# Patient Record
Sex: FEMALE | Race: WHITE | NOT HISPANIC OR LATINO | Employment: OTHER | URBAN - METROPOLITAN AREA
[De-identification: names, ages, dates, MRNs, and addresses within clinical notes are randomized per-mention and may not be internally consistent; named-entity substitution may affect disease eponyms.]

---

## 2017-01-09 RX ORDER — ESCITALOPRAM OXALATE 20 MG/1
TABLET ORAL
Qty: 90 TAB | Refills: 0 | Status: SHIPPED | OUTPATIENT
Start: 2017-01-09 | End: 2017-04-07 | Stop reason: SDUPTHER

## 2017-01-10 ENCOUNTER — OFFICE VISIT (OUTPATIENT)
Dept: MEDICAL GROUP | Facility: MEDICAL CENTER | Age: 72
End: 2017-01-10
Payer: MEDICARE

## 2017-01-10 VITALS
SYSTOLIC BLOOD PRESSURE: 98 MMHG | RESPIRATION RATE: 16 BRPM | DIASTOLIC BLOOD PRESSURE: 58 MMHG | HEART RATE: 74 BPM | WEIGHT: 158 LBS | OXYGEN SATURATION: 92 % | TEMPERATURE: 98.8 F | HEIGHT: 67 IN | BODY MASS INDEX: 24.8 KG/M2

## 2017-01-10 DIAGNOSIS — F51.04 CHRONIC INSOMNIA: ICD-10-CM

## 2017-01-10 DIAGNOSIS — F32.0 MILD SINGLE CURRENT EPISODE OF MAJOR DEPRESSIVE DISORDER (HCC): ICD-10-CM

## 2017-01-10 DIAGNOSIS — Z00.00 HEALTH CARE MAINTENANCE: ICD-10-CM

## 2017-01-10 DIAGNOSIS — Z95.5 HISTORY OF CORONARY ARTERY STENT PLACEMENT: ICD-10-CM

## 2017-01-10 DIAGNOSIS — G45.9 TRANSIENT CEREBRAL ISCHEMIA, UNSPECIFIED TYPE: ICD-10-CM

## 2017-01-10 DIAGNOSIS — E03.9 HYPOTHYROIDISM, UNSPECIFIED TYPE: ICD-10-CM

## 2017-01-10 DIAGNOSIS — Z72.0 TOBACCO ABUSE: ICD-10-CM

## 2017-01-10 DIAGNOSIS — I10 ESSENTIAL HYPERTENSION: ICD-10-CM

## 2017-01-10 DIAGNOSIS — I71.40 ABDOMINAL AORTIC ANEURYSM (AAA) WITHOUT RUPTURE (HCC): ICD-10-CM

## 2017-01-10 DIAGNOSIS — E78.5 HYPERLIPIDEMIA, UNSPECIFIED HYPERLIPIDEMIA TYPE: ICD-10-CM

## 2017-01-10 PROCEDURE — 99204 OFFICE O/P NEW MOD 45 MIN: CPT | Performed by: FAMILY MEDICINE

## 2017-01-10 RX ORDER — CARVEDILOL 6.25 MG/1
6.25 TABLET ORAL 2 TIMES DAILY WITH MEALS
Qty: 180 TAB | Refills: 0 | Status: SHIPPED | OUTPATIENT
Start: 2017-01-10 | End: 2017-04-07 | Stop reason: SDUPTHER

## 2017-01-10 ASSESSMENT — PATIENT HEALTH QUESTIONNAIRE - PHQ9: CLINICAL INTERPRETATION OF PHQ2 SCORE: 0

## 2017-01-10 NOTE — TELEPHONE ENCOUNTER
Was the patient seen in the last year in this department? Yes     Does patient have an active prescription for medications requested? No     Received Request Via: Pharmacy      Pt met protocol?: Yes, LABS 9/16, OV 9/16

## 2017-01-10 NOTE — PROGRESS NOTES
CC: Establish a new PCP.    HPI:  Evie presents today to establish a new primary care relationship.     Lives with her . Active, and independent with all ADLs. Has the following issues:    Essential hypertension, her BP has been slightly low. Patient stated that ometimes she feels lightheaded, denies headache, nausea, vomiting, denies falls.has been on losartan to 100 mg daily., and Amlodipine 5 mg daily.    Hyperlipidemia, she has been tolerating the statin. Denies muscle pain LFTs has been normal, she is on lipitor 80 mg daily.    Abdominal aortic aneurysm, has been stable, and asymptomatic. Last CT showed decreased size of the aneurysm ( was 4.7, last one was 4.4). Will continue watch.    H/O transient cerebral ischemia, twice attacks in 2000, and 2005.Has had no residual.She currently on ASA, and statin.    Mild major depressive disorder, she has been doing fine on Lexapro. No side effects. No suicidal ideation.    History of coronary artery stent placement, has been asymptomatic. No chest pain, and SOB.She is currently on Carvedilol 6.25 mg daily, lipitor 80 mg daily, and aspirin.    Chronic insomnia, she has been doing fine on trazodone 75 mg daily.    Tobacco abuse, smokes 1/4 of a pack daily, was counseled about smoking cessation she does not want to quit.    Hypothyroidism, she has been tolerating Levothyroxine, no palpitation, no cold or heat intolerance, she is on levothyroxine 100 mcg daily.    Pneumonia vaccine, and flu shot are UTD.      Patient Active Problem List    Diagnosis Date Noted   • Chronic insomnia 05/23/2016   • AAA (abdominal aortic aneurysm) ([facility]) 04/20/2016   • Tobacco abuse 04/20/2016   • Pain 02/23/2016   • ADRIANNA on CPAP 02/25/2015   • Postural hypotension 10/07/2014   • Nicotine dependence 10/07/2014   • Hypothyroid 10/17/2013   • Depression 09/26/2013   • CAD (coronary artery disease) 10/04/2012   • Hyperlipidemia 10/04/2012   • Hypertension 10/04/2012   • Aortic aneurysm  ([facility]) 10/04/2012   • TIA (transient ischemic attack) 10/04/2012       Current Outpatient Prescriptions   Medication Sig Dispense Refill   • carvedilol (COREG) 6.25 MG Tab Take 1 Tab by mouth 2 times a day, with meals. 180 Tab 0   • escitalopram (LEXAPRO) 20 MG tablet TAKE 1 TABLET BY MOUTH EVERY DAY 90 Tab 0   • trazodone (DESYREL) 150 MG Tab TAKE 1 TABLET BY MOUTH EVERY NIGHT AT BEDTIME AS NEEDED FOR SLEEP 90 Tab 1   • amlodipine (NORVASC) 5 MG Tab TAKE 1 TABLET BY MOUTH EVERY DAY 90 Tab 1   • spironolactone (ALDACTONE) 50 MG Tab TAKE 1 TABLET BY MOUTH EVERY DAY 90 Tab 1   • atorvastatin (LIPITOR) 80 MG tablet TAKE 1 TABLET BY MOUTH ONCE DAILY 90 Tab 2   • levothyroxine (SYNTHROID) 100 MCG Tab TAKE 1 TABLET BY MOUTH EVERY DAY. NEED LABS DONE ASAP 90 Tab 3   • losartan (COZAAR) 100 MG Tab TAKE 1 TABLET BY MOUTH ONCE DAILY 90 Tab 1   • aspirin (ASA) 325 MG TABS Take 325 mg by mouth every day.       No current facility-administered medications for this visit.         Allergies as of 01/10/2017 - Mic as Reviewed 01/10/2017   Allergen Reaction Noted   • Pcn [penicillins]  06/17/2016        Social History     Social History   • Marital Status:      Spouse Name: N/A   • Number of Children: N/A   • Years of Education: N/A     Occupational History   • Not on file.     Social History Main Topics   • Smoking status: Current Every Day Smoker -- 0.25 packs/day for 40 years     Types: Cigarettes   • Smokeless tobacco: Never Used      Comment: 3-4cigarettes daily, E CIG   • Alcohol Use: No   • Drug Use: No   • Sexual Activity:     Partners: Male     Other Topics Concern   • Not on file     Social History Narrative       Family History   Problem Relation Age of Onset   • Heart Disease Mother    • Heart Attack Mother    • Heart Disease Father    • Heart Attack Father    • Cancer Maternal Grandmother      stomach   • Diabetes Maternal Grandmother    • Cancer Maternal Grandfather      brain   • Stroke Paternal Grandmother   "      History reviewed. No pertinent past surgical history.    ROS:  Denies any Headache, Blurred Vision, Confusion Chest pain,  Shortness of breath,  Abdominal pain, Changes of bowel or bladder, Lower ext edema, Fevers, Nights sweats, Weight Changes, Focal weakness or numbness.  All other systems are negative.    BP 98/58 mmHg  Pulse 74  Temp(Src) 37.1 °C (98.8 °F)  Resp 16  Ht 1.702 m (5' 7.01\")  Wt 71.668 kg (158 lb)  BMI 24.74 kg/m2  SpO2 92%    Physical Exam:  Gen:         Alert and oriented, No apparent distress.  HEENT:   Perrla, TM clear,  Oralpharynx no erythema or exudates.  Neck:       No Jugular venous distension, Lymphadenopathy, Thyromegaly, Bruits.  Lungs:     Clear to auscultation bilaterally  CV:          Regular rate and rhythm. No murmurs, rubs or gallops.  Abd:         Soft non tender, non distended. Normal active bowel sounds. No  hepatosplenomegaly, No pulsatile masses.  Ext:          No clubbing, cyanosis, edema.      Assessment and Plan.   71 y.o. female     1. Essential hypertension  Slightly low. Patient sometimes is lightheaded.  Will decrease losartan to 50 mg daily.  Continue on Amlodipine 5 mg daily.    - CBC WITH DIFFERENTIAL; Future  - COMP METABOLIC PANEL; Future    2. Hyperlipidemia, unspecified hyperlipidemia type  He has been tolerating the statin. Denies muscle pain LFTs has been normal   Continue on lipitor 80 mg daily.    - LIPID PANEL    3. Abdominal aortic aneurysm (AAA) without rupture (HCC)  Stable. Last CT showed decreased size of the aneurysm ( was 4.7, last one was 4.4)  Will continue watch.    4. Transient cerebral ischemia, unspecified type  Twice attacks in 2000, and 2005.  No residual.  Continue ASA, and statin.    5. Mild single current episode of major depressive disorder (HCC)  Has been doing fine on Lexapro. No side effects. No suicidal ideation.    6. History of coronary artery stent placement  Stable. Asymptomatic.  Continue on Carvedilol, statin, and " aspirin.    7. Chronic insomnia  Has been doing fine on trazodone 75 mg daily.    8. Tobacco abuse  Smokes 1/4 of a pack daily, does not want to quit.    9. Health care maintenance  Pneumonia vaccine, and flu shot are UTD>    10. Hypothyroidism, unspecified type  He has been tolerating Levothyroxine, no palpitation, no cold or heat intolerance  Continue on levothyroxine 100 mcg daily.    - TSH+FREE T4

## 2017-01-10 NOTE — MR AVS SNAPSHOT
"        Evie Morillo   1/10/2017 3:00 PM   Office Visit   MRN: [id number]    Department:  [address] [place] [address]   Dept Phone:  [phone]    Description:  Female : 1945   Provider:  Elliott Aguayo M.D.           Reason for Visit     New Patient establish      Allergies as of 1/10/2017     Allergen Noted Reactions    Pcn [Penicillins] 2016         You were diagnosed with     Essential hypertension   [6008162]       Hyperlipidemia, unspecified hyperlipidemia type   [7287911]       Abdominal aortic aneurysm (AAA) without rupture (HCC)   [6442534]       Transient cerebral ischemia, unspecified type   [6685047]       Mild single current episode of major depressive disorder (HCC)   [4677327]       History of coronary artery stent placement   [260389]       Chronic insomnia   [537898]       Tobacco abuse   [944700]       Health care maintenance   [990033]       Hypothyroidism, unspecified type   [1666571]         Vital Signs     Blood Pressure Pulse Temperature Respirations Height Weight    98/58 mmHg 74 37.1 °C (98.8 °F) 16 1.702 m (5' 7.01\") 71.668 kg (158 lb)    Body Mass Index Oxygen Saturation Smoking Status             24.74 kg/m2 92% Current Every Day Smoker         Basic Information     Date Of Birth Sex Race Ethnicity Preferred Language    1945 Female White Non- English      Your appointments     2017 10:20 AM   Established Patient with Elliott Aguayo M.D.   [facility] [address]    [address]  [address]  [place] [address] [phone]           You will be receiving a confirmation call a few days before your appointment from our automated call confirmation system.            2017  3:15 PM   FOLLOW UP with Andre Pulido M.D.   [facility] (--)    [address], [address]  [place] [address] [phone]              Problem List              ICD-10-CM Priority Class Noted - " Resolved    CAD (coronary artery disease) I25.10   10/4/2012 - Present    Hyperlipidemia E78.5   10/4/2012 - Present    Hypertension I10   10/4/2012 - Present    Aortic aneurysm (HCC) I71.9   10/4/2012 - Present    TIA (transient ischemic attack) G45.9   10/4/2012 - Present    Depression F32.9   9/26/2013 - Present    Hypothyroid E03.9   10/17/2013 - Present    Postural hypotension I95.1   10/7/2014 - Present    Nicotine dependence F17.200   10/7/2014 - Present    ADRIANNA on CPAP G47.33   2/25/2015 - Present    Pain R52   2/23/2016 - Present    AAA (abdominal aortic aneurysm) (HCC) I71.4   4/20/2016 - Present    Tobacco abuse Z72.0   4/20/2016 - Present    Chronic insomnia F51.04   5/23/2016 - Present      Health Maintenance        Date Due Completion Dates    IMM DTaP/Tdap/Td Vaccine (1 - Tdap) 6/24/1962 ---    IMM ZOSTER VACCINE 6/24/2003 ---    IMM INFLUENZA (1) 9/1/2016 12/8/2015    IMM PNEUMOCOCCAL 65+ (ADULT) LOW/MEDIUM RISK SERIES (2 of 2 - PPSV23) 12/8/2016 12/8/2015    BONE DENSITY 4/29/2017 4/29/2012 (Done)    Override on 4/29/2012: Done    COLON CANCER SCREENING ANNUAL FIT 9/6/2017 9/6/2016, 9/23/2014    MAMMOGRAM 9/6/2017 9/6/2016 (Declined), 2/25/2015 (Declined), 10/29/2013 (Declined), 10/29/2010 (Done)    Override on 9/6/2016: Patient Declined    Override on 2/25/2015: Patient Declined    Override on 10/29/2013: Patient Declined    Override on 10/29/2010: Done    COLONOSCOPY 9/6/2021 9/6/2016 (Declined), 10/29/2010 (Done)    Override on 9/6/2016: Patient Declined    Override on 10/29/2010: Done (polyps)            Current Immunizations     13-VALENT PCV PREVNAR 12/8/2015    Influenza Vaccine Adult HD 12/8/2015      Below and/or attached are the medications your provider expects you to take. Review all of your home medications and newly ordered medications with your provider and/or pharmacist. Follow medication instructions as directed by your provider and/or pharmacist. Please keep your medication list  with you and share with your provider. Update the information when medications are discontinued, doses are changed, or new medications (including over-the-counter products) are added; and carry medication information at all times in the event of emergency situations     Allergies:  PCN - (reactions not documented)               Medications  Valid as of: January 10, 2017 -  3:37 PM    Generic Name Brand Name Tablet Size Instructions for use    AmLODIPine Besylate (Tab) NORVASC 5 MG TAKE 1 TABLET BY MOUTH EVERY DAY        Aspirin (Tab)  MG Take 325 mg by mouth every day.        Atorvastatin Calcium (Tab) LIPITOR 80 MG TAKE 1 TABLET BY MOUTH ONCE DAILY        Carvedilol (Tab) COREG 6.25 MG Take 1 Tab by mouth 2 times a day, with meals.        Escitalopram Oxalate (Tab) LEXAPRO 20 MG TAKE 1 TABLET BY MOUTH EVERY DAY        Levothyroxine Sodium (Tab) SYNTHROID 100 MCG TAKE 1 TABLET BY MOUTH EVERY DAY. NEED LABS DONE ASAP        Losartan Potassium (Tab) COZAAR 100 MG TAKE 1 TABLET BY MOUTH ONCE DAILY        Spironolactone (Tab) ALDACTONE 50 MG TAKE 1 TABLET BY MOUTH EVERY DAY        TraZODone HCl (Tab) DESYREL 150 MG TAKE 1 TABLET BY MOUTH EVERY NIGHT AT BEDTIME AS NEEDED FOR SLEEP        .                 Medicines prescribed today were sent to:     [id number]  [place], [place] - [address] [facility] [address]    [address] [facility] [address]    Phone: [phone] Fax: [phone]    Open 24 Hours?: No      Medication refill instructions:       If your prescription bottle indicates you have medication refills left, it is not necessary to call your provider’s office. Please contact your pharmacy and they will refill your medication.    If your prescription bottle indicates you do not have any refills left, you may request refills at any time through one of the following ways: The online [employer] system (except Urgent Care), by calling your provider’s office, or by asking your  pharmacy to contact your provider’s office with a refill request. Medication refills are processed only during regular business hours and may not be available until the next business day. Your provider may request additional information or to have a follow-up visit with you prior to refilling your medication.   *Please Note: Medication refills are assigned a new Rx number when refilled electronically. Your pharmacy may indicate that no refills were authorized even though a new prescription for the same medication is available at the pharmacy. Please request the medicine by name with the pharmacy before contacting your provider for a refill.        Your To Do List     Future Labs/Procedures Complete By Expires    CBC WITH DIFFERENTIAL  As directed 1/10/2018    COMP METABOLIC PANEL  As directed 1/10/2018         [employer] Access Code: [email or web address]  Expires: 1/28/2017  2:03 PM    Searcheeze  A secure, online tool to manage your health information     [employer]’s Searcheeze® is a secure, online tool that connects you to your personalized health information from the privacy of your home -- day or night - making it very easy for you to manage your healthcare. Once the activation process is completed, you can even access your medical information using the Searcheeze ame, which is available for free in the Apple Ame store or Google Play store.     Sear[employer]ze provides the following levels of access (as shown below):   My Chart Features   [facility] Primary [facility] Doctor [facility]  Specialists [facility]  Urgent  Care Non-[facility]  Primary Care  Doctor   Email your healthcare team securely and privately 24/7 X X X    Manage appointments: schedule your next appointment; view details of past/upcoming appointments X      Request prescription refills. X      View recent personal medical records, including lab and immunizations X X X X   View health record, including health history, allergies, medications X X X X   Read reports about your outpatient  visits, procedures, consult and ER notes X X X X   See your discharge summary, which is a recap of your hospital and/or ER visit that includes your diagnosis, lab results, and care plan. X X       How to register for [employer]:  1. Go to  [email or web address].  2. Click on the Sign Up Now box, which takes you to the New Member Sign Up page. You will need to provide the following information:  a. Enter your [employer] Access Code exactly as it appears at the top of this page. (You will not need to use this code after you’ve completed the sign-up process. If you do not sign up before the expiration date, you must request a new code.)   b. Enter your date of birth.   c. Enter your home email address.   d. Click Submit, and follow the next screen’s instructions.  3. Create a [employer] ID. This will be your [employer] login ID and cannot be changed, so think of one that is secure and easy to remember.  4. Create a [employer] password. You can change your password at any time.  5. Enter your Password Reset Question and Answer. This can be used at a later time if you forget your password.   6. Enter your e-mail address. This allows you to receive e-mail notifications when new information is available in [employer].  7. Click Sign Up. You can now view your health information.    For assistance activating your [employer] account, call [phone]

## 2017-01-23 DIAGNOSIS — E78.5 HYPERLIPIDEMIA, UNSPECIFIED HYPERLIPIDEMIA TYPE: ICD-10-CM

## 2017-01-23 RX ORDER — LOSARTAN POTASSIUM 100 MG/1
TABLET ORAL
Qty: 90 TAB | Refills: 1 | Status: SHIPPED | OUTPATIENT
Start: 2017-01-23 | End: 2017-07-30 | Stop reason: SDUPTHER

## 2017-01-25 RX ORDER — LOSARTAN POTASSIUM 100 MG/1
TABLET ORAL
Refills: 0 | OUTPATIENT
Start: 2017-01-25

## 2017-01-25 NOTE — TELEPHONE ENCOUNTER
Was the patient seen in the last year in this department? Yes     Does patient have an active prescription for medications requested? Yes     Received Request Via: Pharmacy      Pt met protocol?: No

## 2017-04-07 ENCOUNTER — HOSPITAL ENCOUNTER (OUTPATIENT)
Dept: LAB | Facility: MEDICAL CENTER | Age: 72
End: 2017-04-07
Attending: FAMILY MEDICINE
Payer: MEDICARE

## 2017-04-07 DIAGNOSIS — I10 ESSENTIAL HYPERTENSION: ICD-10-CM

## 2017-04-07 LAB
ALBUMIN SERPL BCP-MCNC: 3.6 G/DL (ref 3.2–4.9)
ALBUMIN/GLOB SERPL: 1.2 G/DL
ALP SERPL-CCNC: 108 U/L (ref 30–99)
ALT SERPL-CCNC: 49 U/L (ref 2–50)
ANION GAP SERPL CALC-SCNC: 4 MMOL/L (ref 0–11.9)
AST SERPL-CCNC: 75 U/L (ref 12–45)
BASOPHILS # BLD AUTO: 0.5 % (ref 0–1.8)
BASOPHILS # BLD: 0.02 K/UL (ref 0–0.12)
BILIRUB SERPL-MCNC: 0.5 MG/DL (ref 0.1–1.5)
BUN SERPL-MCNC: 15 MG/DL (ref 8–22)
CALCIUM SERPL-MCNC: 9.3 MG/DL (ref 8.5–10.5)
CHLORIDE SERPL-SCNC: 108 MMOL/L (ref 96–112)
CHOLEST SERPL-MCNC: 108 MG/DL (ref 100–199)
CO2 SERPL-SCNC: 26 MMOL/L (ref 20–33)
CREAT SERPL-MCNC: 1.06 MG/DL (ref 0.5–1.4)
EOSINOPHIL # BLD AUTO: 0.19 K/UL (ref 0–0.51)
EOSINOPHIL NFR BLD: 4.9 % (ref 0–6.9)
ERYTHROCYTE [DISTWIDTH] IN BLOOD BY AUTOMATED COUNT: 48 FL (ref 35.9–50)
GFR SERPL CREATININE-BSD FRML MDRD: 51 ML/MIN/1.73 M 2
GLOBULIN SER CALC-MCNC: 3.1 G/DL (ref 1.9–3.5)
GLUCOSE SERPL-MCNC: 96 MG/DL (ref 65–99)
HCT VFR BLD AUTO: 39.1 % (ref 37–47)
HDLC SERPL-MCNC: 31 MG/DL
HGB BLD-MCNC: 12.9 G/DL (ref 12–16)
IMM GRANULOCYTES # BLD AUTO: 0.01 K/UL (ref 0–0.11)
IMM GRANULOCYTES NFR BLD AUTO: 0.3 % (ref 0–0.9)
LDLC SERPL CALC-MCNC: 63 MG/DL
LYMPHOCYTES # BLD AUTO: 0.97 K/UL (ref 1–4.8)
LYMPHOCYTES NFR BLD: 25.1 % (ref 22–41)
MCH RBC QN AUTO: 32.1 PG (ref 27–33)
MCHC RBC AUTO-ENTMCNC: 33 G/DL (ref 33.6–35)
MCV RBC AUTO: 97.3 FL (ref 81.4–97.8)
MONOCYTES # BLD AUTO: 0.38 K/UL (ref 0–0.85)
MONOCYTES NFR BLD AUTO: 9.8 % (ref 0–13.4)
NEUTROPHILS # BLD AUTO: 2.29 K/UL (ref 2–7.15)
NEUTROPHILS NFR BLD: 59.4 % (ref 44–72)
NRBC # BLD AUTO: 0 K/UL
NRBC BLD AUTO-RTO: 0 /100 WBC
PLATELET # BLD AUTO: 159 K/UL (ref 164–446)
PMV BLD AUTO: 11.4 FL (ref 9–12.9)
POTASSIUM SERPL-SCNC: 4 MMOL/L (ref 3.6–5.5)
PROT SERPL-MCNC: 6.7 G/DL (ref 6–8.2)
RBC # BLD AUTO: 4.02 M/UL (ref 4.2–5.4)
SODIUM SERPL-SCNC: 138 MMOL/L (ref 135–145)
T4 FREE SERPL-MCNC: 1.62 NG/DL (ref 0.53–1.43)
TRIGL SERPL-MCNC: 71 MG/DL (ref 0–149)
TSH SERPL DL<=0.005 MIU/L-ACNC: 0.41 UIU/ML (ref 0.3–3.7)
WBC # BLD AUTO: 3.9 K/UL (ref 4.8–10.8)

## 2017-04-07 PROCEDURE — 80053 COMPREHEN METABOLIC PANEL: CPT

## 2017-04-07 PROCEDURE — 84443 ASSAY THYROID STIM HORMONE: CPT

## 2017-04-07 PROCEDURE — 85025 COMPLETE CBC W/AUTO DIFF WBC: CPT

## 2017-04-07 PROCEDURE — 80061 LIPID PANEL: CPT

## 2017-04-07 PROCEDURE — 84439 ASSAY OF FREE THYROXINE: CPT

## 2017-04-07 PROCEDURE — 36415 COLL VENOUS BLD VENIPUNCTURE: CPT

## 2017-04-07 RX ORDER — ESCITALOPRAM OXALATE 20 MG/1
TABLET ORAL
Refills: 0 | OUTPATIENT
Start: 2017-04-07

## 2017-04-07 RX ORDER — CARVEDILOL 6.25 MG/1
TABLET ORAL
Refills: 0 | OUTPATIENT
Start: 2017-04-07

## 2017-04-07 RX ORDER — CARVEDILOL 6.25 MG/1
6.25 TABLET ORAL 2 TIMES DAILY WITH MEALS
Qty: 180 TAB | Refills: 0 | Status: SHIPPED | OUTPATIENT
Start: 2017-04-07 | End: 2017-07-25 | Stop reason: SDUPTHER

## 2017-04-07 RX ORDER — ESCITALOPRAM OXALATE 20 MG/1
20 TABLET ORAL
Qty: 90 TAB | Refills: 0 | Status: SHIPPED | OUTPATIENT
Start: 2017-04-07 | End: 2017-07-07 | Stop reason: SDUPTHER

## 2017-04-07 NOTE — TELEPHONE ENCOUNTER
MA: PCP listed as Miah Aguayo, but Carol has been seeing pt regularly for the past few years. Can you f/u w/pt and change PCP is appropriate? Thanks.    Was the patient seen in the last year in this department? Yes     Does patient have an active prescription for medications requested? No     Received Request Via: Pharmacy      Pt met protocol?: No, OV 9/16.

## 2017-04-11 ENCOUNTER — OFFICE VISIT (OUTPATIENT)
Dept: MEDICAL GROUP | Facility: MEDICAL CENTER | Age: 72
End: 2017-04-11
Payer: MEDICARE

## 2017-04-11 VITALS
BODY MASS INDEX: 25.16 KG/M2 | HEART RATE: 56 BPM | DIASTOLIC BLOOD PRESSURE: 52 MMHG | HEIGHT: 66 IN | WEIGHT: 156.53 LBS | SYSTOLIC BLOOD PRESSURE: 90 MMHG | OXYGEN SATURATION: 95 % | TEMPERATURE: 98.1 F | RESPIRATION RATE: 14 BRPM

## 2017-04-11 DIAGNOSIS — E03.9 HYPOTHYROIDISM, UNSPECIFIED TYPE: ICD-10-CM

## 2017-04-11 DIAGNOSIS — R74.8 ABNORMAL LIVER ENZYMES: ICD-10-CM

## 2017-04-11 DIAGNOSIS — E78.5 HYPERLIPIDEMIA, UNSPECIFIED HYPERLIPIDEMIA TYPE: ICD-10-CM

## 2017-04-11 DIAGNOSIS — I10 ESSENTIAL HYPERTENSION: ICD-10-CM

## 2017-04-11 PROCEDURE — 3014F SCREEN MAMMO DOC REV: CPT | Mod: 8P | Performed by: FAMILY MEDICINE

## 2017-04-11 PROCEDURE — 1101F PT FALLS ASSESS-DOCD LE1/YR: CPT | Performed by: FAMILY MEDICINE

## 2017-04-11 PROCEDURE — G8432 DEP SCR NOT DOC, RNG: HCPCS | Performed by: FAMILY MEDICINE

## 2017-04-11 PROCEDURE — 4004F PT TOBACCO SCREEN RCVD TLK: CPT | Performed by: FAMILY MEDICINE

## 2017-04-11 PROCEDURE — G8598 ASA/ANTIPLAT THER USED: HCPCS | Performed by: FAMILY MEDICINE

## 2017-04-11 PROCEDURE — G8419 CALC BMI OUT NRM PARAM NOF/U: HCPCS | Performed by: FAMILY MEDICINE

## 2017-04-11 PROCEDURE — 99214 OFFICE O/P EST MOD 30 MIN: CPT | Performed by: FAMILY MEDICINE

## 2017-04-11 PROCEDURE — 4040F PNEUMOC VAC/ADMIN/RCVD: CPT | Performed by: FAMILY MEDICINE

## 2017-04-11 RX ORDER — LEVOTHYROXINE SODIUM 88 UG/1
88 TABLET ORAL
Qty: 90 TAB | Refills: 1 | Status: SHIPPED | OUTPATIENT
Start: 2017-04-11 | End: 2017-12-18 | Stop reason: SDUPTHER

## 2017-04-11 NOTE — MR AVS SNAPSHOT
"        Evie Edwardsden   2017 10:20 AM   Office Visit   MRN: [id number]    Department:  [address] [place] [address]   Dept Phone:  [phone]    Description:  Female : 1945   Provider:  Elliott Aguayo M.D.           Reason for Visit     Follow-Up 3 month check up, lab results. Low BP      Allergies as of 2017     Allergen Noted Reactions    Pcn [Penicillins] 2016         You were diagnosed with     Essential hypertension   [9913178]       Hyperlipidemia, unspecified hyperlipidemia type   [0602687]       Hypothyroidism, unspecified type   [0900887]       Abnormal liver enzymes   [528978]         Vital Signs     Blood Pressure Pulse Temperature Respirations Height Weight    90/52 mmHg 56 36.7 °C (98.1 °F) 14 1.683 m (5' 6.26\") 71 kg (156 lb 8.4 oz)    Body Mass Index Oxygen Saturation Smoking Status             25.07 kg/m2 95% Current Every Day Smoker         Basic Information     Date Of Birth Sex Race Ethnicity Preferred Language    1945 Female White Non- English      Your appointments     2017  3:15 PM   FOLLOW UP with Andre Pulido M.D.   [facility] (--)    [address]  [facility] [id number] [phone]            May 11, 2017  9:00 AM   Established Patient with Elliott Aguayo M.D.   [facility] [address]    [address]  [facility] [id number] [phone]           You will be receiving a confirmation call a few days before your appointment from our automated call confirmation system.              Problem List              ICD-10-CM Priority Class Noted - Resolved    CAD (coronary artery disease) I25.10   10/4/2012 - Present    Hyperlipidemia E78.5   10/4/2012 - Present    Hypertension I10   10/4/2012 - Present    Aortic aneurysm (CMS-HCC) I71.9   10/4/2012 - Present    TIA (transient ischemic attack) G45.9   10/4/2012 - Present    Depression F32.9   2013 - " Present    Hypothyroid E03.9   10/17/2013 - Present    Postural hypotension I95.1   10/7/2014 - Present    Nicotine dependence F17.200   10/7/2014 - Present    ADRIANNA on CPAP G47.33   2/25/2015 - Present    Pain R52   2/23/2016 - Present    AAA (abdominal aortic aneurysm) (CMS-HCC) I71.4   4/20/2016 - Present    Tobacco abuse Z72.0   4/20/2016 - Present    Chronic insomnia F51.04   5/23/2016 - Present      Health Maintenance        Date Due Completion Dates    IMM DTaP/Tdap/Td Vaccine (1 - Tdap) 6/24/1962 ---    IMM ZOSTER VACCINE 6/24/2003 ---    IMM PNEUMOCOCCAL 65+ (ADULT) LOW/MEDIUM RISK SERIES (2 of 2 - PPSV23) 12/8/2016 12/8/2015    BONE DENSITY 4/29/2017 4/29/2012 (Done)    Override on 4/29/2012: Done    COLON CANCER SCREENING ANNUAL FIT 9/6/2017 9/6/2016, 9/23/2014    MAMMOGRAM 9/6/2017 9/6/2016 (Declined), 2/25/2015 (Declined), 10/29/2013 (Declined), 10/29/2010 (Done)    Override on 9/6/2016: Patient Declined    Override on 2/25/2015: Patient Declined    Override on 10/29/2013: Patient Declined    Override on 10/29/2010: Done    COLONOSCOPY 9/6/2021 9/6/2016 (Declined), 10/29/2010 (Done)    Override on 9/6/2016: Patient Declined    Override on 10/29/2010: Done (polyps)            Current Immunizations     13-VALENT PCV PREVNAR 12/8/2015    Influenza Vaccine Adult HD 12/8/2015      Below and/or attached are the medications your provider expects you to take. Review all of your home medications and newly ordered medications with your provider and/or pharmacist. Follow medication instructions as directed by your provider and/or pharmacist. Please keep your medication list with you and share with your provider. Update the information when medications are discontinued, doses are changed, or new medications (including over-the-counter products) are added; and carry medication information at all times in the event of emergency situations     Allergies:  PCN - (reactions not documented)               Medications  Valid  as of: April 11, 2017 - 11:05 AM    Generic Name Brand Name Tablet Size Instructions for use    Aspirin (Tab)  MG Take 325 mg by mouth every day.        Atorvastatin Calcium (Tab) LIPITOR 80 MG TAKE 1 TABLET BY MOUTH ONCE DAILY        Carvedilol (Tab) COREG 6.25 MG Take 1 Tab by mouth 2 times a day, with meals.        Escitalopram Oxalate (Tab) LEXAPRO 20 MG Take 1 Tab by mouth every day.        Levothyroxine Sodium (Tab) SYNTHROID 88 MCG Take 1 Tab by mouth Every morning on an empty stomach.        Losartan Potassium (Tab) COZAAR 100 MG TAKE 1 TABLET BY MOUTH ONCE DAILY        Spironolactone (Tab) ALDACTONE 50 MG TAKE 1 TABLET BY MOUTH EVERY DAY        TraZODone HCl (Tab) DESYREL 150 MG TAKE 1 TABLET BY MOUTH EVERY NIGHT AT BEDTIME AS NEEDED FOR SLEEP        .                 Medicines prescribed today were sent to:     [id number]  [place], [place] - [address] [facility] [address]    [address] [place] [address]    Phone: [phone] Fax: [phone]    Open 24 Hours?: No      Medication refill instructions:       If your prescription bottle indicates you have medication refills left, it is not necessary to call your provider’s office. Please contact your pharmacy and they will refill your medication.    If your prescription bottle indicates you do not have any refills left, you may request refills at any time through one of the following ways: The online [employer] system (except Urgent Care), by calling your provider’s office, or by asking your pharmacy to contact your provider’s office with a refill request. Medication refills are processed only during regular business hours and may not be available until the next business day. Your provider may request additional information or to have a follow-up visit with you prior to refilling your medication.   *Please Note: Medication refills are assigned a new Rx number when refilled electronically. Your pharmacy may indicate that no  refills were authorized even though a new prescription for the same medication is available at the pharmacy. Please request the medicine by name with the pharmacy before contacting your provider for a refill.        Your To Do List     Future Labs/Procedures Complete By Expires    HEPATIC FUNCTION PANEL  As directed 4/11/2018         Cellabus Access Code: [email or web address]  Expires: 5/11/2017 11:05 AM    Cellabus  A secure, online tool to manage your health information     [employer]ap[employer]’s Cellabus® is a secure, online tool that connects you to your personalized health information from the privacy of your home -- day or night - making it very easy for you to manage your healthcare. Once the activation process is completed, you can even access your medical information using the Cellabus ame, which is available for free in the Apple Ame store or Google Play store.     Cellabus provides the following levels of access (as shown below):   My Chart Features   [facility] Primary Care Doctor [facility]  Specialists [facility]  Urgent  Care [facility]  Primary Care  Doctor   Email your healthcare team securely and privately 24/7 X X X    Manage appointments: schedule your next appointment; view details of past/upcoming appointments X      Request prescription refills. X      View recent personal medical records, including lab and immunizations X X X X   View health record, including health history, allergies, medications X X X X   Read reports about your outpatient visits, procedures, consult and ER notes X X X X   See your discharge summary, which is a recap of your hospital and/or ER visit that includes your diagnosis, lab results, and care plan. X X       How to register for Cellabus:  1. Go to  [email or web address].  2. Click on the Sign Up Now box, which takes you to the New Member Sign Up page. You will need to provide the following information:  a. Enter your Cellabus Access Code exactly as it appears at the top of this page. (You  will not need to use this code after you’ve completed the sign-up process. If you do not sign up before the expiration date, you must request a new code.)   b. Enter your date of birth.   c. Enter your home email address.   d. Click Submit, and follow the next screen’s instructions.  3. Create a Tweekaboo ID. This will be your Tweekaboo login ID and cannot be changed, so think of one that is secure and easy to remember.  4. Create a inSparqt password. You can change your password at any time.  5. Enter your Password Reset Question and Answer. This can be used at a later time if you forget your password.   6. Enter your e-mail address. This allows you to receive e-mail notifications when new information is available in Tweekaboo.  7. Click Sign Up. You can now view your health information.    For assistance activating your Tweekaboo account, call [phone]        Quit Tobacco Information     Do you want to quit using tobacco?    Quitting tobacco decreases risks of cancer, heart and lung disease, increases life expectancy, improves sense of taste and smell, and increases spending money, among other benefits.    If you are thinking about quitting, we can help.  • [facility] Quit Tobacco Program: 686.524.6347  o Program occurs weekly for four weeks and includes pharmacist consultation on products to support quitting smoking or chewing tobacco. A provider referral is needed for pharmacist consultation.  • Tobacco Users Help Hotline: 8-628-QUIT-NOW (058-0912) or https://[place].quitlogix.org/  o Free, confidential telephone and online coaching for [place] residents. Sessions are designed on a schedule that is convenient for you. Eligible clients receive free nicotine replacement therapy.  • Nationally: www.smokefree.gov  o Information and professional assistance to support both immediate and long-term needs as you become, and remain, a non-smoker. Smokefree.gov allows you to choose the help that best fits your needs.

## 2017-04-11 NOTE — PROGRESS NOTES
CC: Multiple medical issues/ review lab result.    HPI:   Evie presents today for follow up of her chronic medical issues:    Essential hypertension, patient has been having low BP, and she has been complaining of lightheadedness intermittently, special when she wakes up in AM. Denies headache, blurry vision, and falls. Has been on Amlodipine 5 mg, losartan 100 mg    Hyperlipidemia, she has been on lipitor 80 mg for h/o TIAs X 2( in 2001, and 2003, non after that) . Last lipid panel showed normal T chol, and LDL.has been having slightly high AST ( 75),but a normal ALT. denies abdominal pain, change in skin, urine, and stool color.    Hypothyroidism, her last lipid panel showed high free T4, and low normal TSH.She has been on levothyroxine from 100 mcg, has been asymptomatic.      Patient Active Problem List    Diagnosis Date Noted   • Chronic insomnia 05/23/2016   • AAA (abdominal aortic aneurysm) (CMS-HCC) 04/20/2016   • Tobacco abuse 04/20/2016   • Pain 02/23/2016   • ADRIANNA on CPAP 02/25/2015   • Postural hypotension 10/07/2014   • Nicotine dependence 10/07/2014   • Hypothyroid 10/17/2013   • Depression 09/26/2013   • CAD (coronary artery disease) 10/04/2012   • Hyperlipidemia 10/04/2012   • Hypertension 10/04/2012   • Aortic aneurysm (CMS-HCC) 10/04/2012   • TIA (transient ischemic attack) 10/04/2012       Current Outpatient Prescriptions   Medication Sig Dispense Refill   • levothyroxine (SYNTHROID) 88 MCG Tab Take 1 Tab by mouth Every morning on an empty stomach. 90 Tab 1   • escitalopram (LEXAPRO) 20 MG tablet Take 1 Tab by mouth every day. 90 Tab 0   • carvedilol (COREG) 6.25 MG Tab Take 1 Tab by mouth 2 times a day, with meals. 180 Tab 0   • losartan (COZAAR) 100 MG Tab TAKE 1 TABLET BY MOUTH ONCE DAILY 90 Tab 1   • trazodone (DESYREL) 150 MG Tab TAKE 1 TABLET BY MOUTH EVERY NIGHT AT BEDTIME AS NEEDED FOR SLEEP 90 Tab 1   • spironolactone (ALDACTONE) 50 MG Tab TAKE 1 TABLET BY MOUTH EVERY DAY 90 Tab 1   •  "atorvastatin (LIPITOR) 80 MG tablet TAKE 1 TABLET BY MOUTH ONCE DAILY 90 Tab 2   • aspirin (ASA) 325 MG TABS Take 325 mg by mouth every day.       No current facility-administered medications for this visit.         Allergies as of 04/11/2017 - Mic as Reviewed 04/11/2017   Allergen Reaction Noted   • Pcn [penicillins]  06/17/2016        ROS: Denies any chest pain, Shortness of breath, Changes bowel or bladder, Lower extremity edema.    Physical Exam:  BP 90/52 mmHg  Pulse 56  Temp(Src) 36.7 °C (98.1 °F)  Resp 14  Ht 1.683 m (5' 6.26\")  Wt 71 kg (156 lb 8.4 oz)  BMI 25.07 kg/m2  SpO2 95%  Gen.: Well-developed, well-nourished, no apparent distress,pleasant and cooperative with the examination  Skin:  Warm and dry with good turgor. No rashes or suspicious lesions in visible areas  HEENT:Sinuses nontender with palpation, TMs clear, nares patent with pink mucosa and clear rhinorrhea,no septal deviation ,polyps or lesions. lips without lesions, oropharynx clear.  Neck: Trachea midline,no masses or adenopathy. No JVD.  Cor: Regular rate and rhythm without murmur, gallop or rub.  Lungs: Respirations unlabored.Clear to auscultation with equal breath sounds bilaterally. No wheezes, rhonchi.  Extremities: No cyanosis, clubbing or edema.  Abd: Soft, NT, ND, BS+.        Assessment and Plan.   71 y.o. female     1. Essential hypertension  Low BP. Symptomatic ( intermittent dizziness)  Will stop Amlodipine,will decrease Losartan to 1/2 a tablet ( 50 mg daily).  Patient advised to check BP 2 times daily, and send me the BP log for reevaluation.  RTC in a month.    2. Hyperlipidemia, unspecified hyperlipidemia type  Has been on lipitor 80 mg . Has normal T chol, and LDL.  Has slightly high AST ( 75),.  Will decrease Lipitor to 1/2 a tablet.  Will repeat LFTs in 3 weeks.  RTC in a month.    3. Hypothyroidism, unspecified type  Last lipid panel showed high free T4, and low normal TSH.  Will decrease levothyroxine from 100 mcg " to 88 mcg.    - levothyroxine (SYNTHROID) 88 MCG Tab; Take 1 Tab by mouth Every morning on an empty stomach.  Dispense: 90 Tab; Refill: 1  - TSH+FREE T4    4. Abnormal liver enzymes  Slightly high AST ( 78), normal ALT, asymptomatic.  Will continue the statin but will decrease the dose. Follow up lipid panel.    - HEPATIC FUNCTION PANEL; Future

## 2017-04-27 ENCOUNTER — OFFICE VISIT (OUTPATIENT)
Dept: CARDIOLOGY | Facility: MEDICAL CENTER | Age: 72
End: 2017-04-27
Payer: MEDICARE

## 2017-04-27 VITALS
BODY MASS INDEX: 24.48 KG/M2 | HEIGHT: 67 IN | OXYGEN SATURATION: 94 % | WEIGHT: 156 LBS | HEART RATE: 58 BPM | SYSTOLIC BLOOD PRESSURE: 96 MMHG | DIASTOLIC BLOOD PRESSURE: 50 MMHG

## 2017-04-27 DIAGNOSIS — F17.219 CIGARETTE NICOTINE DEPENDENCE WITH NICOTINE-INDUCED DISORDER: ICD-10-CM

## 2017-04-27 DIAGNOSIS — G47.33 OSA ON CPAP: ICD-10-CM

## 2017-04-27 DIAGNOSIS — I25.10 CORONARY ARTERY DISEASE INVOLVING NATIVE CORONARY ARTERY OF NATIVE HEART WITHOUT ANGINA PECTORIS: ICD-10-CM

## 2017-04-27 DIAGNOSIS — Z72.0 TOBACCO ABUSE: ICD-10-CM

## 2017-04-27 DIAGNOSIS — I71.40 ABDOMINAL AORTIC ANEURYSM (AAA) WITHOUT RUPTURE (HCC): ICD-10-CM

## 2017-04-27 PROCEDURE — G8420 CALC BMI NORM PARAMETERS: HCPCS | Performed by: INTERNAL MEDICINE

## 2017-04-27 PROCEDURE — 4004F PT TOBACCO SCREEN RCVD TLK: CPT | Performed by: INTERNAL MEDICINE

## 2017-04-27 PROCEDURE — G8598 ASA/ANTIPLAT THER USED: HCPCS | Performed by: INTERNAL MEDICINE

## 2017-04-27 PROCEDURE — 3014F SCREEN MAMMO DOC REV: CPT | Mod: 8P | Performed by: INTERNAL MEDICINE

## 2017-04-27 PROCEDURE — 1101F PT FALLS ASSESS-DOCD LE1/YR: CPT | Performed by: INTERNAL MEDICINE

## 2017-04-27 PROCEDURE — 4040F PNEUMOC VAC/ADMIN/RCVD: CPT | Performed by: INTERNAL MEDICINE

## 2017-04-27 PROCEDURE — G8432 DEP SCR NOT DOC, RNG: HCPCS | Performed by: INTERNAL MEDICINE

## 2017-04-27 PROCEDURE — 99213 OFFICE O/P EST LOW 20 MIN: CPT | Performed by: INTERNAL MEDICINE

## 2017-04-27 NOTE — MR AVS SNAPSHOT
"        Evie Morillo   2017 3:15 PM   Office Visit   MRN: [id number]    Department:  [facility]   Dept Phone:  [phone]    Description:  Female : 1945   Provider:  Andre Pulido M.D.           Reason for Visit     Follow-Up           Allergies as of 2017     Allergen Noted Reactions    Pcn [Penicillins] 2016         Vital Signs     Blood Pressure Pulse Height Weight Body Mass Index Oxygen Saturation    96/50 mmHg 58 1.702 m (5' 7.01\") 70.761 kg (156 lb) 24.43 kg/m2 94%    Smoking Status                   Current Every Day Smoker           Basic Information     Date Of Birth Sex Race Ethnicity Preferred Language    1945 Female White Non- English      Your appointments     May 11, 2017  9:00 AM   Established Patient with Elliott Aguayo M.D.   [facility] [address]    [address]  [address]  [facility] [address]   [phone]           You will be receiving a confirmation call a few days before your appointment from our automated call confirmation system.              Problem List              ICD-10-CM Priority Class Noted - Resolved    CAD (coronary artery disease) I25.10   10/4/2012 - Present    Hyperlipidemia E78.5   10/4/2012 - Present    Hypertension I10   10/4/2012 - Present    Aortic aneurysm (CMS-HCC) I71.9   10/4/2012 - Present    TIA (transient ischemic attack) G45.9   10/4/2012 - Present    Depression F32.9   2013 - Present    Hypothyroid E03.9   10/17/2013 - Present    Postural hypotension I95.1   10/7/2014 - Present    Nicotine dependence F17.200   10/7/2014 - Present    ADRIANNA on CPAP G47.33   2015 - Present    Pain R52   2016 - Present    AAA (abdominal aortic aneurysm) (CMS-HCC) I71.4   2016 - Present    Tobacco abuse Z72.0   2016 - Present    Chronic insomnia F51.04   2016 - Present      Health Maintenance        Date Due Completion Dates    IMM DTaP/Tdap/Td Vaccine (1 - Tdap) 1962 " ---    IMM ZOSTER VACCINE 6/24/2003 ---    IMM PNEUMOCOCCAL 65+ (ADULT) LOW/MEDIUM RISK SERIES (2 of 2 - PPSV23) 12/8/2016 12/8/2015    BONE DENSITY 4/29/2017 4/29/2012 (Done)    Override on 4/29/2012: Done    COLON CANCER SCREENING ANNUAL FIT 9/6/2017 9/6/2016, 9/23/2014    MAMMOGRAM 9/6/2017 9/6/2016 (Declined), 2/25/2015 (Declined), 10/29/2013 (Declined), 10/29/2010 (Done)    Override on 9/6/2016: Patient Declined    Override on 2/25/2015: Patient Declined    Override on 10/29/2013: Patient Declined    Override on 10/29/2010: Done    COLONOSCOPY 9/6/2021 9/6/2016 (Declined), 10/29/2010 (Done)    Override on 9/6/2016: Patient Declined    Override on 10/29/2010: Done (polyps)            Current Immunizations     13-VALENT PCV PREVNAR 12/8/2015    Influenza Vaccine Adult HD 12/8/2015      Below and/or attached are the medications your provider expects you to take. Review all of your home medications and newly ordered medications with your provider and/or pharmacist. Follow medication instructions as directed by your provider and/or pharmacist. Please keep your medication list with you and share with your provider. Update the information when medications are discontinued, doses are changed, or new medications (including over-the-counter products) are added; and carry medication information at all times in the event of emergency situations     Allergies:  PCN - (reactions not documented)               Medications  Valid as of: April 27, 2017 -  3:21 PM    Generic Name Brand Name Tablet Size Instructions for use    Aspirin (Tab)  MG Take 325 mg by mouth every day.        Atorvastatin Calcium (Tab) LIPITOR 80 MG TAKE 1 TABLET BY MOUTH ONCE DAILY        Carvedilol (Tab) COREG 6.25 MG Take 1 Tab by mouth 2 times a day, with meals.        Escitalopram Oxalate (Tab) LEXAPRO 20 MG Take 1 Tab by mouth every day.        Levothyroxine Sodium (Tab) SYNTHROID 88 MCG Take 1 Tab by mouth Every morning on an empty stomach.         Losartan Potassium (Tab) COZAAR 100 MG TAKE 1 TABLET BY MOUTH ONCE DAILY        Spironolactone (Tab) ALDACTONE 50 MG TAKE 1 TABLET BY MOUTH EVERY DAY        TraZODone HCl (Tab) DESYREL 150 MG TAKE 1 TABLET BY MOUTH EVERY NIGHT AT BEDTIME AS NEEDED FOR SLEEP        .                 Medicines prescribed today were sent to:     [id number] - [place], [place] - [address] AT [facility] [address]    [address] [place] [address]    Phone: [phone] Fax: [phone]    Open 24 Hours?: No      Medication refill instructions:       If your prescription bottle indicates you have medication refills left, it is not necessary to call your provider’s office. Please contact your pharmacy and they will refill your medication.    If your prescription bottle indicates you do not have any refills left, you may request refills at any time through one of the following ways: The online [employer] system (except Urgent Care), by calling your provider’s office, or by asking your pharmacy to contact your provider’s office with a refill request. Medication refills are processed only during regular business hours and may not be available until the next business day. Your provider may request additional information or to have a follow-up visit with you prior to refilling your medication.   *Please Note: Medication refills are assigned a new Rx number when refilled electronically. Your pharmacy may indicate that no refills were authorized even though a new prescription for the same medication is available at the pharmacy. Please request the medicine by name with the pharmacy before contacting your provider for a refill.           [employer] Access Code: Activation code not generated  Current [employer] Status: Active          Quit Tobacco Information     Do you want to quit using tobacco?    Quitting tobacco decreases risks of cancer, heart and lung disease, increases life expectancy, improves sense of taste and smell,  and increases spending money, among other benefits.    If you are thinking about quitting, we can help.  • [facility] Quit Tobacco Program: 632.383.1427  o Program occurs weekly for four weeks and includes pharmacist consultation on products to support quitting smoking or chewing tobacco. A provider referral is needed for pharmacist consultation.  • Tobacco Users Help Hotline: 6-800-QUIT-NOW (809-6679) or https://[place].quitlogix.org/  o Free, confidential telephone and online coaching for [place] residents. Sessions are designed on a schedule that is convenient for you. Eligible clients receive free nicotine replacement therapy.  • Nationally: www.smokefree.gov  o Information and professional assistance to support both immediate and long-term needs as you become, and remain, a non-smoker. Smokefree.gov allows you to choose the help that best fits your needs.

## 2017-04-27 NOTE — PROGRESS NOTES
Subjective:   Evie Morillo is a 70 y.o. female who presents today for follow-up of CAD and hypertension. She has a history of 2 stents placed in [place] to an unknown artery in 2009, AAA followed by Dr. Beaver, hyperlipidemia on good therapy with excellent numbers and history of a TIA in 2003. She continues to smoke but has cut down significantly and has lost weight. Her blood pressure is good and in fact has been a little low and her PCP has reduced her therapy appropriately.    Denies any other cardiovascular symptoms including chest pain, shortness of breath, dyspnea on exertion, lightheadedness, syncope or presyncope, lower extremity edema, PND, orthopnea or palpitations.      Past Medical History   Diagnosis Date   • Hypertension    • AAA (abdominal aortic aneurysm) (CMS-HCC)    • Diverticula of colon    • CAD (coronary artery disease) 10/4/2012     2 stents [place] 2009    • Hyperlipidemia 10/4/2012   • Aortic aneurysm (CMS-HCC) 10/4/2012   • TIA (transient ischemic attack) 10/4/2012     2003    • Depression 9/26/2013   • Thyroid disease      History reviewed. No pertinent past surgical history.  Family History   Problem Relation Age of Onset   • Heart Disease Mother    • Heart Attack Mother    • Heart Disease Father    • Heart Attack Father    • Cancer Maternal Grandmother      stomach   • Diabetes Maternal Grandmother    • Cancer Maternal Grandfather      brain   • Stroke Paternal Grandmother      History   Smoking status   • Current Every Day Smoker -- 0.25 packs/day for 40 years   • Types: Cigarettes   Smokeless tobacco   • Never Used     Comment: 3-4cigarettes daily, E CIG     Allergies   Allergen Reactions   • Pcn [Penicillins]      Outpatient Encounter Prescriptions as of 4/27/2017   Medication Sig Dispense Refill   • levothyroxine (SYNTHROID) 88 MCG Tab Take 1 Tab by mouth Every morning on an empty stomach. 90 Tab 1   • escitalopram (LEXAPRO) 20 MG tablet Take 1 Tab by mouth every day. 90 Tab 0  "  • carvedilol (COREG) 6.25 MG Tab Take 1 Tab by mouth 2 times a day, with meals. (Patient taking differently: Take 6.25 mg by mouth every day.) 180 Tab 0   • losartan (COZAAR) 100 MG Tab TAKE 1 TABLET BY MOUTH ONCE DAILY 90 Tab 1   • trazodone (DESYREL) 150 MG Tab TAKE 1 TABLET BY MOUTH EVERY NIGHT AT BEDTIME AS NEEDED FOR SLEEP 90 Tab 1   • spironolactone (ALDACTONE) 50 MG Tab TAKE 1 TABLET BY MOUTH EVERY DAY 90 Tab 1   • atorvastatin (LIPITOR) 80 MG tablet TAKE 1 TABLET BY MOUTH ONCE DAILY 90 Tab 2   • aspirin (ASA) 325 MG TABS Take 325 mg by mouth every day.       No facility-administered encounter medications on file as of 4/27/2017.     Review of Systems   All other systems reviewed and are negative.         Objective:   BP 96/50 mmHg  Pulse 58  Ht 1.702 m (5' 7.01\")  Wt 70.761 kg (156 lb)  BMI 24.43 kg/m2  SpO2 94%    Physical Exam   Constitutional: She is oriented to person, place, and time. She appears well-developed and well-nourished. No distress.   HENT:   Head: Normocephalic and atraumatic.   Mouth/Throat: Oropharynx is clear and moist. No oropharyngeal exudate.   Eyes: Conjunctivae are normal. Pupils are equal, round, and reactive to light. No scleral icterus.   Neck: Normal range of motion. Neck supple. No JVD present. No thyromegaly present.   Cardiovascular: Normal rate, regular rhythm, normal heart sounds and intact distal pulses.  Exam reveals no gallop and no friction rub.    No murmur heard.  Pulses:       Carotid pulses are 2+ on the right side, and 2+ on the left side.       Radial pulses are 2+ on the right side, and 2+ on the left side.        Popliteal pulses are 2+ on the right side, and 2+ on the left side.        Dorsalis pedis pulses are 1+ on the right side, and 1+ on the left side.        Posterior tibial pulses are 1+ on the left side.   Pulmonary/Chest: Effort normal and breath sounds normal. She has no wheezes. She has no rales.   Abdominal: Soft. Bowel sounds are normal. She " exhibits no distension. There is no tenderness.   Pulsatile abdominal mass, not severely enlarged by palpation   Musculoskeletal: She exhibits edema (1+ bilateral). She exhibits no tenderness.   Neurological: She is alert and oriented to person, place, and time. No cranial nerve deficit.   Skin: Skin is warm and dry. No rash noted. She is not diaphoretic. No erythema.   Psychiatric: She has a normal mood and affect. Her behavior is normal.   Vitals reviewed.    LABS:  Lab Results   Component Value Date/Time    CHOLESTEROL, 04/07/2017 08:55 AM    LDL 63 04/07/2017 08:55 AM    HDL 31* 04/07/2017 08:55 AM    TRIGLYCERIDES 71 04/07/2017 08:55 AM       Lab Results   Component Value Date/Time    WBC 3.9* 04/07/2017 08:55 AM    RBC 4.02* 04/07/2017 08:55 AM    HEMOGLOBIN 12.9 04/07/2017 08:55 AM    HEMATOCRIT 39.1 04/07/2017 08:55 AM    MCV 97.3 04/07/2017 08:55 AM    NEUTROPHILS-POLYS 59.40 04/07/2017 08:55 AM    LYMPHOCYTES 25.10 04/07/2017 08:55 AM    MONOCYTES 9.80 04/07/2017 08:55 AM    EOSINOPHILS 4.90 04/07/2017 08:55 AM    BASOPHILS 0.50 04/07/2017 08:55 AM     Lab Results   Component Value Date/Time    SODIUM 138 04/07/2017 08:55 AM    POTASSIUM 4.0 04/07/2017 08:55 AM    CHLORIDE 108 04/07/2017 08:55 AM    CO2 26 04/07/2017 08:55 AM    GLUCOSE 96 04/07/2017 08:55 AM    BUN 15 04/07/2017 08:55 AM    CREATININE 1.06 04/07/2017 08:55 AM         Lab Results   Component Value Date/Time    ALKALINE PHOSPHATASE 108* 04/07/2017 08:55 AM    AST(SGOT) 75* 04/07/2017 08:55 AM    ALT(SGPT) 49 04/07/2017 08:55 AM    TOTAL BILIRUBIN 0.5 04/07/2017 08:55 AM      Lab Results   Component Value Date/Time    B NATRIURETIC PEPTIDE 10 06/17/2016 02:26 PM      No results found for: TSH  Lab Results   Component Value Date/Time    PT 12.6 06/17/2016 02:26 PM    INR 0.94 06/17/2016 02:26 PM              Assessment:     1. Coronary artery disease involving native coronary artery of native heart without angina pectoris     2.  Abdominal aortic aneurysm (AAA) without rupture (CMS-HCC)     3. Tobacco abuse     4. ADRIANNA on CPAP     5. Cigarette nicotine dependence with nicotine-induced disorder         Medical Decision Making:  Today's Assessment / Status / Plan:     She is feeling well. She has no complaints today. She has cut down her smoking to one or 2 cigarettes per day. She is trying to quit. Blood pressures are borderline low but relatively asymptomatic and her medications have been adjusted already by her PCP appropriately. Her AST is mildly elevated and likely a sequela of her statin therapy however this is tolerable at this level and requires surveillance only. Goal LDL is less than 70 and she has achieved this.      Recommendations:    1. Goal LDL less than 70, labs pending  2. Blood pressure is excellent  3. Good medical therapy  4. Tobacco cessation discussed at length  5. Continue close follow-up with Dr. Beaver for her AAA    Follow-up one year

## 2017-05-04 ENCOUNTER — HOSPITAL ENCOUNTER (OUTPATIENT)
Dept: LAB | Facility: MEDICAL CENTER | Age: 72
End: 2017-05-04
Attending: FAMILY MEDICINE
Payer: MEDICARE

## 2017-05-04 DIAGNOSIS — R74.8 ABNORMAL LIVER ENZYMES: ICD-10-CM

## 2017-05-04 LAB
ALBUMIN SERPL BCP-MCNC: 3.6 G/DL (ref 3.2–4.9)
ALP SERPL-CCNC: 107 U/L (ref 30–99)
ALT SERPL-CCNC: 43 U/L (ref 2–50)
AST SERPL-CCNC: 62 U/L (ref 12–45)
BILIRUB CONJ SERPL-MCNC: 0.1 MG/DL (ref 0.1–0.5)
BILIRUB INDIRECT SERPL-MCNC: 0.4 MG/DL (ref 0–1)
BILIRUB SERPL-MCNC: 0.5 MG/DL (ref 0.1–1.5)
PROT SERPL-MCNC: 6.9 G/DL (ref 6–8.2)

## 2017-05-04 PROCEDURE — 36415 COLL VENOUS BLD VENIPUNCTURE: CPT

## 2017-05-04 PROCEDURE — 80076 HEPATIC FUNCTION PANEL: CPT

## 2017-05-09 ENCOUNTER — TELEPHONE (OUTPATIENT)
Dept: MEDICAL GROUP | Facility: MEDICAL CENTER | Age: 72
End: 2017-05-09

## 2017-05-09 NOTE — TELEPHONE ENCOUNTER
Future Appointments       Provider Department Center    5/11/2017 9:00 AM Elliott Aguayo M.D. [facility] [address]          ESTABLISHED PATIENT PRE-VISIT PLANNING     Note: Patient will not be contacted if there is no indication to call. PT was not Contacted.    1.    Reviewed note from last office visit with PCP: YES Last office visit: 04/11/17    2.  If any orders were placed at last visit, do we have Results/Consult Notes?        •  Labs - Labs were not ordered at last office visit. 04/07/17       •  Imaging - Imaging was not ordered at last office visit.        •  Referrals - Referral ordered, patient was seen and consult notes are in chart. Care Teams updated  YES.     3.  Immunizations were updated in Epic using WebIZ?: Epic matches WebIZ       •  Web Iz Recommendations: HEPATITIS A  HEPATITIS B VARICELLA (Chicken Pox)  ZOSTAVAX (Shingles)    4.  Patient is due for the following Health Maintenance Topics:   Health Maintenance Due   Topic Date Due   • Annual Wellness Visit  NEEDS TO SCHEDULE   • IMM ZOSTER VACCINE  NEEDS SCRIPT   • BONE DENSITY  NEEDS ORDERS       5.  Patient was not informed to arrive 15 min prior to their scheduled appointment and bring in their medication bottles.

## 2017-05-11 ENCOUNTER — OFFICE VISIT (OUTPATIENT)
Dept: MEDICAL GROUP | Facility: MEDICAL CENTER | Age: 72
End: 2017-05-11
Payer: MEDICARE

## 2017-05-11 VITALS
DIASTOLIC BLOOD PRESSURE: 68 MMHG | SYSTOLIC BLOOD PRESSURE: 106 MMHG | WEIGHT: 156.09 LBS | RESPIRATION RATE: 14 BRPM | TEMPERATURE: 97.8 F | HEIGHT: 67 IN | OXYGEN SATURATION: 95 % | HEART RATE: 57 BPM | BODY MASS INDEX: 24.5 KG/M2

## 2017-05-11 DIAGNOSIS — E78.5 HYPERLIPIDEMIA, UNSPECIFIED HYPERLIPIDEMIA TYPE: ICD-10-CM

## 2017-05-11 DIAGNOSIS — R74.8 ELEVATED LIVER ENZYMES: ICD-10-CM

## 2017-05-11 PROCEDURE — 4004F PT TOBACCO SCREEN RCVD TLK: CPT | Performed by: FAMILY MEDICINE

## 2017-05-11 PROCEDURE — 1101F PT FALLS ASSESS-DOCD LE1/YR: CPT | Performed by: FAMILY MEDICINE

## 2017-05-11 PROCEDURE — G8432 DEP SCR NOT DOC, RNG: HCPCS | Performed by: FAMILY MEDICINE

## 2017-05-11 PROCEDURE — G8598 ASA/ANTIPLAT THER USED: HCPCS | Performed by: FAMILY MEDICINE

## 2017-05-11 PROCEDURE — G8420 CALC BMI NORM PARAMETERS: HCPCS | Performed by: FAMILY MEDICINE

## 2017-05-11 PROCEDURE — 3014F SCREEN MAMMO DOC REV: CPT | Mod: 8P | Performed by: FAMILY MEDICINE

## 2017-05-11 PROCEDURE — 99213 OFFICE O/P EST LOW 20 MIN: CPT | Performed by: FAMILY MEDICINE

## 2017-05-11 PROCEDURE — 4040F PNEUMOC VAC/ADMIN/RCVD: CPT | Performed by: FAMILY MEDICINE

## 2017-05-11 NOTE — MR AVS SNAPSHOT
"        Evie Morillo   2017 9:00 AM   Office Visit   MRN: [id number]    Department:  [address] [place] [address]   Dept Phone:  [phone]    Description:  Female : 1945   Provider:  Elliott Aguayo M.D.           Reason for Visit     Follow-Up 1 month check up.    Otalgia left ear ache. since this morning    Letter for School/Work DMV Physical Eval.      Allergies as of 2017     Allergen Noted Reactions    Pcn [Penicillins] 2016         You were diagnosed with     Hyperlipidemia, unspecified hyperlipidemia type   [4857587]       Elevated liver enzymes   [[id number]]         Vital Signs     Blood Pressure Pulse Temperature Respirations Height Weight    106/68 mmHg 57 36.6 °C (97.8 °F) 14 1.702 m (5' 7\") 70.8 kg (156 lb 1.4 oz)    Body Mass Index Oxygen Saturation Smoking Status             24.44 kg/m2 95% Current Every Day Smoker         Basic Information     Date Of Birth Sex Race Ethnicity Preferred Language    1945 Female White Non- English      Problem List              ICD-10-CM Priority Class Noted - Resolved    CAD (coronary artery disease) I25.10   10/4/2012 - Present    Hyperlipidemia E78.5   10/4/2012 - Present    Hypertension I10   10/4/2012 - Present    Aortic aneurysm (CMS-HCC) I71.9   10/4/2012 - Present    TIA (transient ischemic attack) G45.9   10/4/2012 - Present    Depression F32.9   2013 - Present    Hypothyroid E03.9   10/17/2013 - Present    Postural hypotension I95.1   10/7/2014 - Present    Nicotine dependence F17.200   10/7/2014 - Present    ADRIANNA on CPAP G47.33, Z99.89   2015 - Present    Pain R52   2016 - Present    AAA (abdominal aortic aneurysm) (CMS-HCC) I71.4   2016 - Present    Tobacco abuse Z72.0   2016 - Present    Chronic insomnia F51.04   2016 - Present      Health Maintenance        Date Due Completion Dates    IMM ZOSTER VACCINE 2003 ---    BONE DENSITY 2017 (Done)    Override on 2012: " Done    IMM DTaP/Tdap/Td Vaccine (1 - Tdap) 4/21/2022 (Originally 4/22/2012) 4/21/2012    COLON CANCER SCREENING ANNUAL FIT 9/6/2017 9/6/2016, 9/23/2014    MAMMOGRAM 9/6/2017 9/6/2016 (Declined), 2/25/2015 (Declined), 10/29/2013 (Declined), 10/29/2010 (Done)    Override on 9/6/2016: Patient Declined    Override on 2/25/2015: Patient Declined    Override on 10/29/2013: Patient Declined    Override on 10/29/2010: Done    COLONOSCOPY 9/6/2021 9/6/2016 (Declined), 10/29/2010 (Done)    Override on 9/6/2016: Patient Declined    Override on 10/29/2010: Done (polyps)            Current Immunizations     13-VALENT PCV PREVNAR 12/8/2015    Influenza Vaccine Adult HD 12/8/2015    Pneumococcal polysaccharide vaccine (PPSV-23) 8/29/2013    TD Vaccine 4/21/2012      Below and/or attached are the medications your provider expects you to take. Review all of your home medications and newly ordered medications with your provider and/or pharmacist. Follow medication instructions as directed by your provider and/or pharmacist. Please keep your medication list with you and share with your provider. Update the information when medications are discontinued, doses are changed, or new medications (including over-the-counter products) are added; and carry medication information at all times in the event of emergency situations     Allergies:  PCN - (reactions not documented)               Medications  Valid as of: May 11, 2017 - 10:21 AM    Generic Name Brand Name Tablet Size Instructions for use    Aspirin (Tab)  MG Take 325 mg by mouth every day.        Atorvastatin Calcium (Tab) LIPITOR 80 MG TAKE 1 TABLET BY MOUTH ONCE DAILY        Carvedilol (Tab) COREG 6.25 MG Take 1 Tab by mouth 2 times a day, with meals.        Escitalopram Oxalate (Tab) LEXAPRO 20 MG Take 1 Tab by mouth every day.        Levothyroxine Sodium (Tab) SYNTHROID 88 MCG Take 1 Tab by mouth Every morning on an empty stomach.        Losartan Potassium (Tab) COZAAR  100 MG TAKE 1 TABLET BY MOUTH ONCE DAILY        Spironolactone (Tab) ALDACTONE 50 MG TAKE 1 TABLET BY MOUTH EVERY DAY        TraZODone HCl (Tab) DESYREL 150 MG TAKE 1 TABLET BY MOUTH EVERY NIGHT AT BEDTIME AS NEEDED FOR SLEEP        .                 Medicines prescribed today were sent to:     [id number] - [place], [place] - [address] AT [facility] [address]    [address] [place] [address]    Phone: [phone] Fax: [phone]    Open 24 Hours?: No      Medication refill instructions:       If your prescription bottle indicates you have medication refills left, it is not necessary to call your provider’s office. Please contact your pharmacy and they will refill your medication.    If your prescription bottle indicates you do not have any refills left, you may request refills at any time through one of the following ways: The online [employer] system (except Urgent Care), by calling your provider’s office, or by asking your pharmacy to contact your provider’s office with a refill request. Medication refills are processed only during regular business hours and may not be available until the next business day. Your provider may request additional information or to have a follow-up visit with you prior to refilling your medication.   *Please Note: Medication refills are assigned a new Rx number when refilled electronically. Your pharmacy may indicate that no refills were authorized even though a new prescription for the same medication is available at the pharmacy. Please request the medicine by name with the pharmacy before contacting your provider for a refill.        Your To Do List     Future Labs/Procedures Complete By Expires    HEPATIC FUNCTION PANEL  As directed 5/11/2018         [employer] Access Code: Activation code not generated  Current [employer] Status: Active          Quit Tobacco Information     Do you want to quit using tobacco?    Quitting tobacco decreases risks of cancer,  heart and lung disease, increases life expectancy, improves sense of taste and smell, and increases spending money, among other benefits.    If you are thinking about quitting, we can help.  • [facility] Quit Tobacco Program: 843.257.9383  o Program occurs weekly for four weeks and includes pharmacist consultation on products to support quitting smoking or chewing tobacco. A provider referral is needed for pharmacist consultation.  • Tobacco Users Help Hotline: 8-800-QUIT-NOW (798-8987) or https://[place].quitlogix.org/  o Free, confidential telephone and online coaching for [place] residents. Sessions are designed on a schedule that is convenient for you. Eligible clients receive free nicotine replacement therapy.  • Nationally: www.smokefree.gov  o Information and professional assistance to support both immediate and long-term needs as you become, and remain, a non-smoker. Smokefree.gov allows you to choose the help that best fits your needs.

## 2017-05-11 NOTE — PROGRESS NOTES
CC: Elevated liver enzymes    HPI:   Evie presents today patient came for follow up of her elevated liver enzyme, patient last blood work showed slight improvement of the liver enzyme , AST was 75 , now is 62, has been asymptomatic , denies abdominal pain , nausea, vomiting, change in the color of the skin , urine, or stool.      Patient Active Problem List    Diagnosis Date Noted   • Chronic insomnia 05/23/2016   • AAA (abdominal aortic aneurysm) (CMS-HCC) 04/20/2016   • Tobacco abuse 04/20/2016   • Pain 02/23/2016   • ADRIANNA on CPAP 02/25/2015   • Postural hypotension 10/07/2014   • Nicotine dependence 10/07/2014   • Hypothyroid 10/17/2013   • Depression 09/26/2013   • CAD (coronary artery disease) 10/04/2012   • Hyperlipidemia 10/04/2012   • Hypertension 10/04/2012   • Aortic aneurysm (CMS-HCC) 10/04/2012   • TIA (transient ischemic attack) 10/04/2012       Current Outpatient Prescriptions   Medication Sig Dispense Refill   • levothyroxine (SYNTHROID) 88 MCG Tab Take 1 Tab by mouth Every morning on an empty stomach. 90 Tab 1   • escitalopram (LEXAPRO) 20 MG tablet Take 1 Tab by mouth every day. 90 Tab 0   • carvedilol (COREG) 6.25 MG Tab Take 1 Tab by mouth 2 times a day, with meals. (Patient taking differently: Take 6.25 mg by mouth every day.) 180 Tab 0   • losartan (COZAAR) 100 MG Tab TAKE 1 TABLET BY MOUTH ONCE DAILY 90 Tab 1   • trazodone (DESYREL) 150 MG Tab TAKE 1 TABLET BY MOUTH EVERY NIGHT AT BEDTIME AS NEEDED FOR SLEEP 90 Tab 1   • spironolactone (ALDACTONE) 50 MG Tab TAKE 1 TABLET BY MOUTH EVERY DAY 90 Tab 1   • atorvastatin (LIPITOR) 80 MG tablet TAKE 1 TABLET BY MOUTH ONCE DAILY 90 Tab 2   • aspirin (ASA) 325 MG TABS Take 325 mg by mouth every day.       No current facility-administered medications for this visit.         Allergies as of 05/11/2017 - Mic as Reviewed 05/11/2017   Allergen Reaction Noted   • Pcn [penicillins]  06/17/2016        ROS: Denies any chest pain, Shortness of breath, Changes  "bowel or bladder, Lower extremity edema.    Physical Exam:      /68 mmHg  Pulse 57  Temp(Src) 36.6 °C (97.8 °F)  Resp 14  Ht 1.702 m (5' 7\")  Wt 70.8 kg (156 lb 1.4 oz)  BMI 24.44 kg/m2  SpO2 95%    Gen.: Well-developed, well-nourished, no apparent distress,pleasant and cooperative with the examination  Abd: Soft, NT, ND, BS=          Assessment and Plan.   71 y.o. female     1. Hyperlipidemia, unspecified hyperlipidemia type  For now continue on lipitor 40 mg ( decreased last visit because of the elevated LFTs). Will continue watch his LFTs.    - HEPATIC FUNCTION PANEL; Future    2. Elevated liver enzymes  Has improved a little bit.( AST was 75, now is 62).  Will continue watch, repeat LFTs in a month.  for now continue on statin ( dose reduced.)    - HEPATIC FUNCTION PANEL; Future        "

## 2017-05-17 DIAGNOSIS — F32.A DEPRESSION, UNSPECIFIED DEPRESSION TYPE: ICD-10-CM

## 2017-05-17 RX ORDER — TRAZODONE HYDROCHLORIDE 150 MG/1
TABLET ORAL
Qty: 90 TAB | Refills: 1 | Status: SHIPPED | OUTPATIENT
Start: 2017-05-17 | End: 2018-01-18 | Stop reason: SDUPTHER

## 2017-05-17 RX ORDER — TRAZODONE HYDROCHLORIDE 150 MG/1
TABLET ORAL
Refills: 0 | OUTPATIENT
Start: 2017-05-17

## 2017-05-17 NOTE — TELEPHONE ENCOUNTER
Was the patient seen in the last year in this department? Yes     Does patient have an active prescription for medications requested? No     Received Request Via: Pharmacy      Pt met protocol?: Yes

## 2017-05-18 RX ORDER — AMLODIPINE BESYLATE 5 MG/1
TABLET ORAL
Refills: 0 | OUTPATIENT
Start: 2017-05-18

## 2017-05-18 NOTE — TELEPHONE ENCOUNTER
CALLED [facility] 05/18/2017 AND ASKED FOR THIS REQUEST TO BE RE-ROUTE TO CORRECT PCP OFFICE.[place]

## 2017-05-19 RX ORDER — SPIRONOLACTONE 50 MG/1
TABLET, FILM COATED ORAL
Qty: 90 TAB | Refills: 1 | Status: SHIPPED | OUTPATIENT
Start: 2017-05-19 | End: 2017-11-27 | Stop reason: SDUPTHER

## 2017-05-31 ENCOUNTER — PATIENT OUTREACH (OUTPATIENT)
Dept: HEALTH INFORMATION MANAGEMENT | Facility: OTHER | Age: 72
End: 2017-05-31

## 2017-06-21 NOTE — PROGRESS NOTES
Attempt #:1    WebIZ Checked & Epic Updated: yes  HealthConnect Verified: no  Verify PCP: yes    Communication Preference Obtained: yes     Review Care Team: yes    Annual Wellness Visit Scheduling  1. Scheduling Status:Scheduled     Care Gap Scheduling (Attempt to Schedule EACH Overdue Care Gap!)     Health Maintenance Due   Topic Date Due   • Annual Wellness Visit  SCHEDULED   • IMM ZOSTER VACCINE  PT WILL DISCUSS WITH PCP   • BONE DENSITY  PT WILL DISCUSS WITH PCP         MyChart Activation: already active  [employer] Ame: no  Virtual Visits: no  Opt In to Text Messages: no

## 2017-06-30 ENCOUNTER — OFFICE VISIT (OUTPATIENT)
Dept: MEDICAL GROUP | Facility: CLINIC | Age: 72
End: 2017-06-30
Payer: MEDICARE

## 2017-06-30 VITALS
TEMPERATURE: 97.9 F | HEIGHT: 66 IN | OXYGEN SATURATION: 98 % | SYSTOLIC BLOOD PRESSURE: 102 MMHG | BODY MASS INDEX: 24.91 KG/M2 | WEIGHT: 155 LBS | DIASTOLIC BLOOD PRESSURE: 66 MMHG | HEART RATE: 63 BPM

## 2017-06-30 DIAGNOSIS — Z00.00 MEDICARE ANNUAL WELLNESS VISIT, INITIAL: Primary | ICD-10-CM

## 2017-06-30 DIAGNOSIS — E78.5 HYPERLIPIDEMIA, UNSPECIFIED HYPERLIPIDEMIA TYPE: ICD-10-CM

## 2017-06-30 DIAGNOSIS — I71.40 ABDOMINAL AORTIC ANEURYSM (AAA) WITHOUT RUPTURE (HCC): ICD-10-CM

## 2017-06-30 DIAGNOSIS — F51.04 CHRONIC INSOMNIA: ICD-10-CM

## 2017-06-30 DIAGNOSIS — I95.1 POSTURAL HYPOTENSION: ICD-10-CM

## 2017-06-30 DIAGNOSIS — E03.9 HYPOTHYROIDISM, UNSPECIFIED TYPE: ICD-10-CM

## 2017-06-30 DIAGNOSIS — Z72.0 TOBACCO ABUSE: ICD-10-CM

## 2017-06-30 DIAGNOSIS — I10 ESSENTIAL HYPERTENSION: ICD-10-CM

## 2017-06-30 DIAGNOSIS — G47.33 OSA ON CPAP: ICD-10-CM

## 2017-06-30 DIAGNOSIS — G45.9 TRANSIENT CEREBRAL ISCHEMIA, UNSPECIFIED TYPE: ICD-10-CM

## 2017-06-30 DIAGNOSIS — R52 PAIN: ICD-10-CM

## 2017-06-30 PROCEDURE — G0438 PPPS, INITIAL VISIT: HCPCS | Performed by: NURSE PRACTITIONER

## 2017-06-30 ASSESSMENT — PATIENT HEALTH QUESTIONNAIRE - PHQ9: CLINICAL INTERPRETATION OF PHQ2 SCORE: 0

## 2017-06-30 NOTE — ASSESSMENT & PLAN NOTE
No recurrence  No residual affects   Continue with current meds   Followed by Elliott Aguayo M.D..

## 2017-06-30 NOTE — ASSESSMENT & PLAN NOTE
Current smoker, not interest in smoking cessation at this time  Discussed with pt the AAA and the association with smoking  Followed by Elliott Aguayo M.D..

## 2017-06-30 NOTE — PROGRESS NOTES
CC:   Medicare Annual Wellness Visit    HPI:  Evie is a 72 y.o. here for Medicare Annual Wellness Visit    Patient Active Problem List    Diagnosis Date Noted   • Chronic insomnia 05/23/2016   • AAA (abdominal aortic aneurysm) (CMS-HCC) 04/20/2016   • Tobacco abuse 04/20/2016   • Pain 02/23/2016   • ADRIANNA on CPAP 02/25/2015   • Postural hypotension 10/07/2014   • Hypothyroid 10/17/2013   • Depression 09/26/2013   • CAD (coronary artery disease) 10/04/2012   • Hyperlipidemia 10/04/2012   • Hypertension 10/04/2012   • TIA (transient ischemic attack) 10/04/2012     Current Outpatient Prescriptions   Medication Sig Dispense Refill   • spironolactone (ALDACTONE) 50 MG Tab TAKE 1 TABLET BY MOUTH EVERY DAY 90 Tab 1   • trazodone (DESYREL) 150 MG Tab TAKE 1 TABLET BY MOUTH EVERY NIGHT AT BEDTIME AS NEEDED FOR SLEEP 90 Tab 1   • levothyroxine (SYNTHROID) 88 MCG Tab Take 1 Tab by mouth Every morning on an empty stomach. 90 Tab 1   • escitalopram (LEXAPRO) 20 MG tablet Take 1 Tab by mouth every day. 90 Tab 0   • carvedilol (COREG) 6.25 MG Tab Take 1 Tab by mouth 2 times a day, with meals. (Patient taking differently: Take 6.25 mg by mouth every day.) 180 Tab 0   • losartan (COZAAR) 100 MG Tab TAKE 1 TABLET BY MOUTH ONCE DAILY 90 Tab 1   • atorvastatin (LIPITOR) 80 MG tablet TAKE 1 TABLET BY MOUTH ONCE DAILY 90 Tab 2   • aspirin (ASA) 325 MG TABS Take 325 mg by mouth every day.       No current facility-administered medications for this visit.      Current supplements: no  Chronic narcotic pain medicines: no  Allergies: Pcn  Exercise: yes  Current social contact/activities: Has support of family and friends      Screening:  Depression Screening    Little interest or pleasure in doing things?  0 - not at all  Feeling down, depressed , or hopeless? 0 - not at all  Trouble falling or staying asleep, or sleeping too much?     Feeling tired or having little energy?     Poor appetite or overeating?     Feeling bad about yourself -  or that you are a failure or have let yourself or your family down?    Trouble concentrating on things, such as reading the newspaper or watching television?    Moving or speaking so slowly that other people could have noticed.  Or the opposite - being so fidgety or restless that you have been moving around a lot more than usual?     Thoughts that you would be better off dead, or of hurting yourself?     Patient Health Questionnaire Score:    If depressive symptoms identified deferred to follow up visit unless specifically addressed in assessment and plan.    Interpretation of PHQ-9 Total Score   Score Severity   1-4 Minimal Depression   5-9 Mild Depression   10-14 Moderate Depression   15-19 Moderately Severe Depression   20-27 Severe Depression    Screening for Cognitive Impairment    Three Minute Recall (banana, sunrise, fence)  3/3    Draw clock face with all 12 numbers set to the hand to show 10 minures past 11 o'clock  1 4  If cognitive concerns identified deferred to follow up visit unless specifically addressed in assessment and plan.    Fall Risk Assessment    Has the patient had two or more falls in the last year or any fall with injury in the last year?  Yes  If Fall Risk identified deferred to follow up visit unless specifically addressed in assessment and plan.    Safety Assessment    Throw rugs on floor.  Yes  Handrails on all stairs.  Yes  Good lighting in all hallways.  Yes  Difficulty hearing.  No  Patient counseled about all safety risks that were identified.    Functional Assessment ADLs    Are there any barriers preventing you from cooking for yourself or meeting nutritional needs?  No.    Are there any barriers preventing you from driving safely or obtaining transportation?  No.    Are there any barriers preventing you from using a telephone or calling for help?  No.    Are there any barriers preventing you from shopping?  No.    Are there any barriers preventing you from taking care of your own  finances?  No.    Are there any barriers preventing you from managing your medications?  No.    Are currently engaing any exercise or physical activity?  No.       Health Maintenance Summary                Annual Wellness Visit Overdue 1945     IMM ZOSTER VACCINE Overdue 6/24/2003     BONE DENSITY Overdue 4/29/2017      Done 4/29/2012     IMM DTaP/Tdap/Td Vaccine Postponed 4/21/2022 Originally 4/22/2012. Patient Refused     Done 4/21/2012 Imm Admin: TD Vaccine    COLON CANCER SCREENING ANNUAL FIT Next Due 9/6/2017      Done 9/6/2016 OCCULT BLOOD FECES IMMUNOASSAY     Patient has more history with this topic...    MAMMOGRAM Next Due 9/6/2017      Patient Declined 9/6/2016      Patient has more history with this topic...    COLONOSCOPY Next Due 9/6/2021      Patient Declined 9/6/2016      Patient has more history with this topic...          Patient Care Team:  Elliott Aguayo M.D. as PCP - General (Geriatrics)  Andre Pulido M.D. as Consulting Physician (Interventional Cardiology)        Social History   Substance Use Topics   • Smoking status: Current Every Day Smoker -- 0.25 packs/day for 40 years     Types: Cigarettes   • Smokeless tobacco: Never Used      Comment: 3-4cigarettes daily, E CIG   • Alcohol Use: No       Family History   Problem Relation Age of Onset   • Heart Disease Mother    • Heart Attack Mother    • Heart Disease Father    • Heart Attack Father    • Cancer Maternal Grandmother      stomach   • Diabetes Maternal Grandmother    • Cancer Maternal Grandfather      brain   • Stroke Paternal Grandmother      She  has a past medical history of Hypertension; AAA (abdominal aortic aneurysm) (CMS-HCC); Diverticula of colon; CAD (coronary artery disease) (10/4/2012); Hyperlipidemia (10/4/2012); Aortic aneurysm (CMS-HCC) (10/4/2012); TIA (transient ischemic attack) (10/4/2012); Depression (9/26/2013); and Thyroid disease. She also has no past medical history of CHF (congestive heart  "failure) (CMS-[facility]) or Encounter for long-term (current) use of other medications.   Past Surgical History   Procedure Laterality Date   • Stent placement  2008     Dr Can in [place]       ROS:    Ostomy or other tubes or amputations: no  Chronic oxygen use no  Last eye exam 6/2017  : Denies incontinence.   Gait: Uses no assistive device   Hearing excellent.    Dentition poor    Exam: Blood pressure 102/66, pulse 63, temperature 36.6 °C (97.9 °F), height 1.687 m (5' 6.42\"), weight 70.308 kg (155 lb), SpO2 98 %. Body mass index is 24.7 kg/(m^2).  Alert, oriented in no acute distress.  Eye contact is good, speech goal directed, affect calm      Assessment and Plan. The following treatment and monitoring plan is recommended for all problems as listed below:   Tobacco abuse  Current smoker, not interest in smoking cessation at this time  Discussed with pt the AAA and the association with smoking  Followed by Elliott Aguayo M.D..      TIA (transient ischemic attack)  No recurrence  No residual affects   Continue with current meds   Followed by Elliott Aguayo M.D..     Postural hypotension  Monitored by cardiology  Pt states BP managed lower due to AAA at 4.7 cm      Pain  Chronic condition managed with current medical regimen  Stable per review, increased pain to back and hips when during strenuous house work   Continue with surveillance, states occ ASA prn  Followed by Elliott Aguayo M.D..        ADRIANNA on CPAP  States not using CPAP  Pt will be contact [facility]  States CPAP not working for her    Nicotine dependence  duplicate    Hypothyroid  Chronic condition managed with current medical regimen  Stable per review   Continue with current meds  Followed by Elliott Aguayo M.D..        Hypertension  Chronic condition managed with current medical regimen  Stable per review   Continue with current meds  Followed by Elliott Aguayo M.D..    "     Hyperlipidemia  Chronic condition managed with current medical regimen  4/2017   Cholesterol,Tot 108 100 - 199 mg/dL Final      Triglycerides 71 0 - 149 mg/dL Final     HDL 31 (A) >=40 mg/dL Final     LDL 63 <100 mg/dL Final   Stable per review   Continue with current meds  Followed by Elliott Aguayo M.D..        Depression  Chronic condition managed with current medical regimen  Stable per review   Continue with current meds  Followed by Elliott Aguayo M.D..        CAD (coronary artery disease)  Followed by cardiology q 6 months  Denies cardiac sxs  Continue with current medications     Chronic insomnia  Chronic condition managed with current medical regimen  Stable per review   Continue with current meds  Followed by Elliott Aguayo M.D..        AAA (abdominal aortic aneurysm)  Chronic condition managed with current medical regimen  6/17/2017   Impression U/S       1.  Infrarenal abdominal aortic aneurysm measuring 4.7 cm in greatest dimension with crescentic thrombus. No periaortic fluid or inflammatory changes.    Continue with current meds  Followed by vascular Dr Beaver q 6 months     Aortic aneurysm (CMS-[facility])  duplicate        Health Care Screening recommendations reviewed with patient today: per Patient Instructions  DPA/Advanced directive: .has NO advanced directive - not interested in additional information    Next office visit for recheck of chronic medical conditions is due with Elliott Aguayo M.D. in 6 months

## 2017-06-30 NOTE — ASSESSMENT & PLAN NOTE
Chronic condition managed with current medical regimen  Stable per review, increased pain to back and hips when during strenuous house work   Continue with surveillance, states occ ASA prn  Followed by Elliott Aguayo M.D..

## 2017-06-30 NOTE — MR AVS SNAPSHOT
"        Evie Edwardsden   2017 9:00 AM   Office Visit   MRN: [id number]    Department:  [facility]   Dept Phone:  [phone]    Description:  Female : 1945   Provider:  CLIFTON Bailon           Reason for Visit     Annual Exam initial      Allergies as of 2017     Allergen Noted Reactions    Pcn [Penicillins] 2016         You were diagnosed with     Medicare annual wellness visit, initial   [218290]  -  Primary     Tobacco abuse   [797147]       Transient cerebral ischemia, unspecified type   [3577158]       Postural hypotension   [861627]       Pain   [361832]       ADRIANNA on CPAP   [341469]       Hypothyroidism, unspecified type   [4209255]       Essential hypertension   [2990980]       Hyperlipidemia, unspecified hyperlipidemia type   [6132959]       Chronic insomnia   [348963]       Abdominal aortic aneurysm (AAA) without rupture (CMS-HCC)   [2068884]         Vital Signs     Blood Pressure Pulse Temperature Height Weight Body Mass Index    102/66 mmHg 63 36.6 °C (97.9 °F) 1.687 m (5' 6.42\") 70.308 kg (155 lb) 24.70 kg/m2    Oxygen Saturation Smoking Status                98% Current Every Day Smoker          Basic Information     Date Of Birth Sex Race Ethnicity Preferred Language    1945 Female White Non- English      Problem List              ICD-10-CM Priority Class Noted - Resolved    CAD (coronary artery disease) I25.10   10/4/2012 - Present    Hyperlipidemia E78.5   10/4/2012 - Present    Hypertension I10   10/4/2012 - Present    TIA (transient ischemic attack) G45.9   10/4/2012 - Present    Depression F32.9   2013 - Present    Hypothyroid E03.9   10/17/2013 - Present    Postural hypotension I95.1   10/7/2014 - Present    ADRIANNA on CPAP G47.33, Z99.89   2015 - Present    Pain R52   2016 - Present    AAA (abdominal aortic aneurysm) (CMS-HCC) I71.4   2016 - Present    Tobacco abuse Z72.0   2016 - Present    Chronic insomnia F51.04   " 5/23/2016 - Present      Health Maintenance        Date Due Completion Dates    IMM ZOSTER VACCINE 6/24/2003 ---    BONE DENSITY 4/29/2017 4/29/2012 (Done)    Override on 4/29/2012: Done    IMM DTaP/Tdap/Td Vaccine (1 - Tdap) 4/21/2022 (Originally 4/22/2012) 4/21/2012    COLON CANCER SCREENING ANNUAL FIT 9/6/2017 9/6/2016, 9/23/2014    MAMMOGRAM 9/6/2017 9/6/2016 (Declined), 2/25/2015 (Declined), 10/29/2013 (Declined), 10/29/2010 (Done)    Override on 9/6/2016: Patient Declined    Override on 2/25/2015: Patient Declined    Override on 10/29/2013: Patient Declined    Override on 10/29/2010: Done    COLONOSCOPY 9/6/2021 9/6/2016 (Declined), 10/29/2010 (Done)    Override on 9/6/2016: Patient Declined    Override on 10/29/2010: Done (polyps)            Current Immunizations     13-VALENT PCV PREVNAR 12/8/2015    Influenza Vaccine Adult HD 12/8/2015    Pneumococcal polysaccharide vaccine (PPSV-23) 8/29/2013    TD Vaccine 4/21/2012      Below and/or attached are the medications your provider expects you to take. Review all of your home medications and newly ordered medications with your provider and/or pharmacist. Follow medication instructions as directed by your provider and/or pharmacist. Please keep your medication list with you and share with your provider. Update the information when medications are discontinued, doses are changed, or new medications (including over-the-counter products) are added; and carry medication information at all times in the event of emergency situations     Allergies:  PCN - (reactions not documented)               Medications  Valid as of: June 30, 2017 -  9:56 AM    Generic Name Brand Name Tablet Size Instructions for use    Aspirin (Tab)  MG Take 325 mg by mouth every day.        Atorvastatin Calcium (Tab) LIPITOR 80 MG TAKE 1 TABLET BY MOUTH ONCE DAILY        Carvedilol (Tab) COREG 6.25 MG Take 1 Tab by mouth 2 times a day, with meals.        Escitalopram Oxalate (Tab) LEXAPRO  20 MG Take 1 Tab by mouth every day.        Levothyroxine Sodium (Tab) SYNTHROID 88 MCG Take 1 Tab by mouth Every morning on an empty stomach.        Losartan Potassium (Tab) COZAAR 100 MG TAKE 1 TABLET BY MOUTH ONCE DAILY        Spironolactone (Tab) ALDACTONE 50 MG TAKE 1 TABLET BY MOUTH EVERY DAY        TraZODone HCl (Tab) DESYREL 150 MG TAKE 1 TABLET BY MOUTH EVERY NIGHT AT BEDTIME AS NEEDED FOR SLEEP        .                 Medicines prescribed today were sent to:     [facility] [address] [place] [address] [place] [address] [facility] [address]    [address] [place] [address]    Phone: [phone] Fax: [phone]    Open 24 Hours?: No      Medication refill instructions:       If your prescription bottle indicates you have medication refills left, it is not necessary to call your provider’s office. Please contact your pharmacy and they will refill your medication.    If your prescription bottle indicates you do not have any refills left, you may request refills at any time through one of the following ways: The online [employer] system (except Urgent Care), by calling your provider’s office, or by asking your pharmacy to contact your provider’s office with a refill request. Medication refills are processed only during regular business hours and may not be available until the next business day. Your provider may request additional information or to have a follow-up visit with you prior to refilling your medication.   *Please Note: Medication refills are assigned a new Rx number when refilled electronically. Your pharmacy may indicate that no refills were authorized even though a new prescription for the same medication is available at the pharmacy. Please request the medicine by name with the pharmacy before contacting your provider for a refill.        Instructions    Continue with care through Elliott Aguayo M.D..  Next Medicare Annual Wellness Visit is due in one year.    Continue  care with specialists you are seeing for your chronic problems.    Attached is some preventative information for you to read.          Fall Prevention and Home Safety  Falls cause injuries and can affect all age groups. It is possible to prevent falls.   HOW TO PREVENT FALLS  · Wear shoes with rubber soles that do not have an opening for your toes.   · Keep the inside and outside of your house well lit.   · Use night lights throughout your home.   · Remove clutter from floors.   · Clean up floor spills.   · Remove throw rugs or fasten them to the floor with carpet tape.   · Do not place electrical cords across pathways.   · Put grab bars by your tub, shower, and toilet. Do not use towel bars as grab bars.   · Put handrails on both sides of the stairway. Fix loose handrails.   · Do not climb on stools or stepladders, if possible.   · Do not wax your floors.   · Repair uneven or unsafe sidewalks, walkways, or stairs.   · Keep items you use a lot within reach.   · Be aware of pets.   · Keep emergency numbers next to the telephone.   · Put smoke detectors in your home and near bedrooms.   Ask your doctor what other things you can do to prevent falls.  Document Released: 10/14/2010 Document Revised: 06/18/2013 Document Reviewed: 03/19/2013  ExitCare® Patient Information ©2013 "[employer].    Exercise to Stay Healthy      Exercise helps you become and stay healthy.  EXERCISE IDEAS AND TIPS  Choose exercises that:  · You enjoy.   · Fit into your day.   You do not need to exercise really hard to be healthy. You can do exercises at a slow or medium level and stay healthy. You can:  · Stretch before and after working out.   · Try yoga, Pilates, or chris chi.   · Lift weights.   · Walk fast, swim, jog, run, climb stairs, bicycle, dance, or rollerskate.   · Take aerobic classes.   Exercises that burn about 150 calories:  · Running 1 ½ miles in 15 minutes.   · Playing volleyball for 45 to 60 minutes.   · Washing and waxing a car  for 45 to 60 minutes.   · Playing touch football for 45 minutes.   · Walking 1 ¾ miles in 35 minutes.   · Pushing a stroller 1 ½ miles in 30 minutes.   · Playing basketball for 30 minutes.   · Raking leaves for 30 minutes.   · Bicycling 5 miles in 30 minutes.   · Walking 2 miles in 30 minutes.   · Dancing for 30 minutes.   · Shoveling snow for 15 minutes.   · Swimming laps for 20 minutes.   · Walking up stairs for 15 minutes.   · Bicycling 4 miles in 15 minutes.   · Gardening for 30 to 45 minutes.   · Jumping rope for 15 minutes.   · Washing windows or floors for 45 to 60 minutes.     One suggestion is to start walking for 10 minutes after each meal.  This will help with digestion and help you to metabolize your meal.       For Weight Loss -   Recommend portion sizes with more fruits/vegetables/high fiber foods.  Stay away from white bread/pastas/white rice/white potatoes.  Increase Fluid intake to 6-8 glasses of water daily.   Eliminate soda's (diet and regular) from your fluid intake.      Document Released: 01/20/2012 Document Revised: 03/11/2013 Document Reviewed: 01/20/2012  ExitCare® Patient Information ©2013 [employer].    Fat and Cholesterol Control Diet  Cholesterol is a wax-like substance. It comes from your liver and is found in certain foods. There is good (HDL) and bad (LDL) cholesterol. Too much cholesterol in your blood can affect your heart. Certain foods can lower or raise your cholesterol. Eat foods that are low in cholesterol.  Saturated and trans fats are bad fats found in foods that will raise your cholesterol. Do not eat foods that are high in saturated and trans fats.  FOODS HIGHER IN SATURATED AND TRANS FATS  · Dairy products, such as whole milk, eggs, cheese, cream, and butter.   · Fatty meats, such as hot dogs, sausage, and salami.   · Fried foods.   · Trans fats which are found in margarine and pre-made cookies, crackers, and baked goods.   · Tropical oils, such as coconut and palm  oils.   Read package labels at the store. Do not buy products that use saturated or trans fats or hydrogenated oils. Find foods labeled:  · Low-fat.   · Low-saturated fat.   · Trans-fat-free.   · Low-cholesterol.   FOODS LOWER IN CHOLESTEROL  ·  Fruit.   · Vegetables.   · Beans, peas, and lentils.   · Fish.   · Lean meat, such as chicken (without skin) or ground turkey.   · Grains, such as barley, rice, couscous, bulgur wheat, and pasta.   · Heart-healthy tub margarine.   PREPARING YOUR FOOD  · Broil, bake, steam, or roast foods. Do not barros food.   · Use non-stick cooking sprays.   · Use lemon or herbs to flavor food instead of using butter or stick margarine.   · Use nonfat yogurt, salsa, or low-fat dressings for salads.   Document Released: 06/18/2013 Document Reviewed: 06/18/2013  [employer]® Patient Information ©2013 [employer].    Recommend annual flu vaccine.  Next due in Fall, 2015.  If you decide not to have the flu vaccine, recommend good handwashing and staying out of crowds during flu season.                  noodls Access Code: Activation code not generated  Current noodls Status: Active          Quit Tobacco Information     Do you want to quit using tobacco?    Quitting tobacco decreases risks of cancer, heart and lung disease, increases life expectancy, improves sense of taste and smell, and increases spending money, among other benefits.    If you are thinking about quitting, we can help.  • [facility] Quit Tobacco Program: 370.324.9305  o Program occurs weekly for four weeks and includes pharmacist consultation on products to support quitting smoking or chewing tobacco. A provider referral is needed for pharmacist consultation.  • Tobacco Users Help Hotline: 5-989-QUIT-NOW (528-0772) or https://[place].quitlogix.org/  o Free, confidential telephone and online coaching for [place] residents. Sessions are designed on a schedule that is convenient for you. Eligible clients receive free nicotine replacement  therapy.  • Nationally: www.smokefree.gov  o Information and professional assistance to support both immediate and long-term needs as you become, and remain, a non-smoker. Smokefree.gov allows you to choose the help that best fits your needs.

## 2017-06-30 NOTE — PATIENT INSTRUCTIONS
Continue with care through Elliott Aguayo M.D..  Next Medicare Annual Wellness Visit is due in one year.    Continue care with specialists you are seeing for your chronic problems.    Attached is some preventative information for you to read.          Fall Prevention and Home Safety  Falls cause injuries and can affect all age groups. It is possible to prevent falls.   HOW TO PREVENT FALLS  · Wear shoes with rubber soles that do not have an opening for your toes.   · Keep the inside and outside of your house well lit.   · Use night lights throughout your home.   · Remove clutter from floors.   · Clean up floor spills.   · Remove throw rugs or fasten them to the floor with carpet tape.   · Do not place electrical cords across pathways.   · Put grab bars by your tub, shower, and toilet. Do not use towel bars as grab bars.   · Put handrails on both sides of the stairway. Fix loose handrails.   · Do not climb on stools or stepladders, if possible.   · Do not wax your floors.   · Repair uneven or unsafe sidewalks, walkways, or stairs.   · Keep items you use a lot within reach.   · Be aware of pets.   · Keep emergency numbers next to the telephone.   · Put smoke detectors in your home and near bedrooms.   Ask your doctor what other things you can do to prevent falls.  Document Released: 10/14/2010 Document Revised: 06/18/2013 Document Reviewed: 03/19/2013  ExitCare® Patient Information ©2013 [employer].    Exercise to Stay Healthy      Exercise helps you become and stay healthy.  EXERCISE IDEAS AND TIPS  Choose exercises that:  · You enjoy.   · Fit into your day.   You do not need to exercise really hard to be healthy. You can do exercises at a slow or medium level and stay healthy. You can:  · Stretch before and after working out.   · Try yoga, Pilates, or chris chi.   · Lift weights.   · Walk fast, swim, jog, run, climb stairs, bicycle, dance, or rollerskate.   · Take aerobic classes.   Exercises that burn  about 150 calories:  · Running 1 ½ miles in 15 minutes.   · Playing volleyball for 45 to 60 minutes.   · Washing and waxing a car for 45 to 60 minutes.   · Playing touch football for 45 minutes.   · Walking 1 ¾ miles in 35 minutes.   · Pushing a stroller 1 ½ miles in 30 minutes.   · Playing basketball for 30 minutes.   · Raking leaves for 30 minutes.   · Bicycling 5 miles in 30 minutes.   · Walking 2 miles in 30 minutes.   · Dancing for 30 minutes.   · Shoveling snow for 15 minutes.   · Swimming laps for 20 minutes.   · Walking up stairs for 15 minutes.   · Bicycling 4 miles in 15 minutes.   · Gardening for 30 to 45 minutes.   · Jumping rope for 15 minutes.   · Washing windows or floors for 45 to 60 minutes.     One suggestion is to start walking for 10 minutes after each meal.  This will help with digestion and help you to metabolize your meal.       For Weight Loss -   Recommend portion sizes with more fruits/vegetables/high fiber foods.  Stay away from white bread/pastas/white rice/white potatoes.  Increase Fluid intake to 6-8 glasses of water daily.   Eliminate soda's (diet and regular) from your fluid intake.      Document Released: 01/20/2012 Document Revised: 03/11/2013 Document Reviewed: 01/20/2012  ExitCare® Patient Information ©2013 [employer].    Fat and Cholesterol Control Diet  Cholesterol is a wax-like substance. It comes from your liver and is found in certain foods. There is good (HDL) and bad (LDL) cholesterol. Too much cholesterol in your blood can affect your heart. Certain foods can lower or raise your cholesterol. Eat foods that are low in cholesterol.  Saturated and trans fats are bad fats found in foods that will raise your cholesterol. Do not eat foods that are high in saturated and trans fats.  FOODS HIGHER IN SATURATED AND TRANS FATS  · Dairy products, such as whole milk, eggs, cheese, cream, and butter.   · Fatty meats, such as hot dogs, sausage, and salami.   · Fried foods.   · Trans  fats which are found in margarine and pre-made cookies, crackers, and baked goods.   · Tropical oils, such as coconut and palm oils.   Read package labels at the store. Do not buy products that use saturated or trans fats or hydrogenated oils. Find foods labeled:  · Low-fat.   · Low-saturated fat.   · Trans-fat-free.   · Low-cholesterol.   FOODS LOWER IN CHOLESTEROL  ·  Fruit.   · Vegetables.   · Beans, peas, and lentils.   · Fish.   · Lean meat, such as chicken (without skin) or ground turkey.   · Grains, such as barley, rice, couscous, bulgur wheat, and pasta.   · Heart-healthy tub margarine.   PREPARING YOUR FOOD  · Broil, bake, steam, or roast foods. Do not barros food.   · Use non-stick cooking sprays.   · Use lemon or herbs to flavor food instead of using butter or stick margarine.   · Use nonfat yogurt, salsa, or low-fat dressings for salads.   Document Released: 06/18/2013 Document Reviewed: 06/18/2013  ExitCare® Patient Information ©2013 [employer].    Recommend annual flu vaccine.  Next due in Fall, 2015.  If you decide not to have the flu vaccine, recommend good handwashing and staying out of crowds during flu season.

## 2017-06-30 NOTE — ASSESSMENT & PLAN NOTE
Chronic condition managed with current medical regimen  6/17/2017   Impression U/S       1.  Infrarenal abdominal aortic aneurysm measuring 4.7 cm in greatest dimension with crescentic thrombus. No periaortic fluid or inflammatory changes.    Continue with current meds  Followed by vascular Dr Beaver q 6 months

## 2017-06-30 NOTE — ASSESSMENT & PLAN NOTE
Chronic condition managed with current medical regimen  4/2017   Cholesterol,Tot 108 100 - 199 mg/dL Final      Triglycerides 71 0 - 149 mg/dL Final     HDL 31 (A) >=40 mg/dL Final     LDL 63 <100 mg/dL Final   Stable per review   Continue with current meds  Followed by Elliott Aguayo M.D..

## 2017-06-30 NOTE — ASSESSMENT & PLAN NOTE
States not using CPAP  Pt will be contact [facility]  States CPAP not working for her

## 2017-06-30 NOTE — ASSESSMENT & PLAN NOTE
Chronic condition managed with current medical regimen  Stable per review   Continue with current meds  Followed by Elliott Aguayo M.D..

## 2017-07-07 RX ORDER — ESCITALOPRAM OXALATE 20 MG/1
TABLET ORAL
Qty: 90 TAB | Refills: 0 | Status: SHIPPED | OUTPATIENT
Start: 2017-07-07 | End: 2017-10-07 | Stop reason: SDUPTHER

## 2017-07-11 DIAGNOSIS — E78.5 HYPERLIPIDEMIA, UNSPECIFIED HYPERLIPIDEMIA TYPE: ICD-10-CM

## 2017-07-11 RX ORDER — ATORVASTATIN CALCIUM 80 MG/1
80 TABLET, FILM COATED ORAL DAILY
Qty: 90 TAB | Refills: 2 | Status: SHIPPED | OUTPATIENT
Start: 2017-07-11 | End: 2018-06-20 | Stop reason: SDUPTHER

## 2017-07-21 ENCOUNTER — HOSPITAL ENCOUNTER (OUTPATIENT)
Dept: LAB | Facility: MEDICAL CENTER | Age: 72
End: 2017-07-21
Attending: FAMILY MEDICINE
Payer: MEDICARE

## 2017-07-21 DIAGNOSIS — R74.8 ELEVATED LIVER ENZYMES: ICD-10-CM

## 2017-07-21 DIAGNOSIS — E78.5 HYPERLIPIDEMIA, UNSPECIFIED HYPERLIPIDEMIA TYPE: ICD-10-CM

## 2017-07-21 LAB
ALBUMIN SERPL BCP-MCNC: 3.7 G/DL (ref 3.2–4.9)
ALP SERPL-CCNC: 97 U/L (ref 30–99)
ALT SERPL-CCNC: 44 U/L (ref 2–50)
AST SERPL-CCNC: 62 U/L (ref 12–45)
BILIRUB CONJ SERPL-MCNC: 0.1 MG/DL (ref 0.1–0.5)
BILIRUB INDIRECT SERPL-MCNC: 0.3 MG/DL (ref 0–1)
BILIRUB SERPL-MCNC: 0.4 MG/DL (ref 0.1–1.5)
PROT SERPL-MCNC: 6.6 G/DL (ref 6–8.2)
T4 FREE SERPL-MCNC: 1.56 NG/DL (ref 0.53–1.43)
TSH SERPL DL<=0.005 MIU/L-ACNC: 0.17 UIU/ML (ref 0.3–3.7)

## 2017-07-21 PROCEDURE — 36415 COLL VENOUS BLD VENIPUNCTURE: CPT

## 2017-07-21 PROCEDURE — 84439 ASSAY OF FREE THYROXINE: CPT

## 2017-07-21 PROCEDURE — 80076 HEPATIC FUNCTION PANEL: CPT

## 2017-07-21 PROCEDURE — 84443 ASSAY THYROID STIM HORMONE: CPT

## 2017-07-25 RX ORDER — CARVEDILOL 6.25 MG/1
6.25 TABLET ORAL 2 TIMES DAILY WITH MEALS
Qty: 180 TAB | Refills: 0 | Status: SHIPPED | OUTPATIENT
Start: 2017-07-25 | End: 2018-01-03 | Stop reason: SDUPTHER

## 2017-07-31 RX ORDER — LOSARTAN POTASSIUM 100 MG/1
TABLET ORAL
Qty: 90 TAB | Refills: 3 | Status: SHIPPED | OUTPATIENT
Start: 2017-07-31 | End: 2018-02-12 | Stop reason: SDUPTHER

## 2017-08-11 ENCOUNTER — OFFICE VISIT (OUTPATIENT)
Dept: MEDICAL GROUP | Facility: MEDICAL CENTER | Age: 72
End: 2017-08-11
Payer: MEDICARE

## 2017-08-11 VITALS
DIASTOLIC BLOOD PRESSURE: 68 MMHG | HEIGHT: 66 IN | SYSTOLIC BLOOD PRESSURE: 116 MMHG | RESPIRATION RATE: 14 BRPM | HEART RATE: 63 BPM | BODY MASS INDEX: 24.59 KG/M2 | WEIGHT: 153 LBS | OXYGEN SATURATION: 94 % | TEMPERATURE: 97.9 F

## 2017-08-11 DIAGNOSIS — R74.8 ELEVATED LIVER ENZYMES: ICD-10-CM

## 2017-08-11 DIAGNOSIS — E78.5 HYPERLIPIDEMIA, UNSPECIFIED HYPERLIPIDEMIA TYPE: ICD-10-CM

## 2017-08-11 DIAGNOSIS — I10 ESSENTIAL HYPERTENSION: ICD-10-CM

## 2017-08-11 DIAGNOSIS — E03.9 HYPOTHYROIDISM, UNSPECIFIED TYPE: ICD-10-CM

## 2017-08-11 PROCEDURE — 99214 OFFICE O/P EST MOD 30 MIN: CPT | Performed by: FAMILY MEDICINE

## 2017-08-11 NOTE — PROGRESS NOTES
CC: multiple medical issues/ follow up with elevated LFTs    HPI:   Evie presents today to discuss the following medical issues:    Essential hypertension, BP has been adequately controlled on current medication. Denies headache, chest pain, and SOB.Has been on losartan, carvedilol, and spironolactone.    Hyperlipidemia, she has been tolerating the statin. Denies muscle pain LFTs has been normal on lipitor 40 mg daily.    Hypothyroidism, her last lipid panel showed high free T4, and low normal TSH.She has been asymptomatic. Has been on levothyroxine 88 mcg daily.    Abnormal liver enzymes, most recent LFTs showed slightly high AST ( 62), normal ALT, and alkaline phosphatase. Patient has been asymptomatic.So we will continue lipitor 40 mg.     Patient Active Problem List    Diagnosis Date Noted   • Chronic insomnia 05/23/2016   • AAA (abdominal aortic aneurysm) (CMS-HCC) 04/20/2016   • Tobacco abuse 04/20/2016   • Pain 02/23/2016   • ADRIANNA on CPAP 02/25/2015   • Postural hypotension 10/07/2014   • Hypothyroid 10/17/2013   • Depression 09/26/2013   • CAD (coronary artery disease) 10/04/2012   • Hyperlipidemia 10/04/2012   • Hypertension 10/04/2012   • TIA (transient ischemic attack) 10/04/2012       Current Outpatient Prescriptions   Medication Sig Dispense Refill   • losartan (COZAAR) 100 MG Tab TAKE 1 TABLET BY MOUTH EVERY DAY 90 Tab 3   • carvedilol (COREG) 6.25 MG Tab Take 1 Tab by mouth 2 times a day, with meals. 180 Tab 0   • atorvastatin (LIPITOR) 80 MG tablet Take 1 Tab by mouth every day. 90 Tab 2   • escitalopram (LEXAPRO) 20 MG tablet TAKE 1 TABLET BY MOUTH EVERY DAY 90 Tab 0   • spironolactone (ALDACTONE) 50 MG Tab TAKE 1 TABLET BY MOUTH EVERY DAY 90 Tab 1   • trazodone (DESYREL) 150 MG Tab TAKE 1 TABLET BY MOUTH EVERY NIGHT AT BEDTIME AS NEEDED FOR SLEEP 90 Tab 1   • levothyroxine (SYNTHROID) 88 MCG Tab Take 1 Tab by mouth Every morning on an empty stomach. 90 Tab 1   • aspirin (ASA) 325 MG TABS Take  "325 mg by mouth every day.       No current facility-administered medications for this visit.         Allergies as of 08/11/2017 - Mic as Reviewed 08/11/2017   Allergen Reaction Noted   • Pcn [penicillins]  06/17/2016        ROS: Denies any chest pain, Shortness of breath, Changes bowel or bladder, Lower extremity edema.    Physical Exam:  /68 mmHg  Pulse 63  Temp(Src) 36.6 °C (97.9 °F)  Resp 14  Ht 1.676 m (5' 5.98\")  Wt 69.4 kg (153 lb)  BMI 24.71 kg/m2  SpO2 94%  Gen.: Well-developed, well-nourished, no apparent distress,pleasant and cooperative with the examination  Skin:  Warm and dry with good turgor. No rashes or suspicious lesions in visible areas  HEENT:Sinuses nontender with palpation, TMs clear, nares patent with pink mucosa and clear rhinorrhea,no septal deviation ,polyps or lesions. lips without lesions, oropharynx clear.  Neck: Trachea midline,no masses or adenopathy. No JVD.  Cor: Regular rate and rhythm without murmur, gallop or rub.  Lungs: Respirations unlabored.Clear to auscultation with equal breath sounds bilaterally. No wheezes, rhonchi.  Extremities: No cyanosis, clubbing or edema.        Assessment and Plan.   72 y.o. female     1. Essential hypertension  Has been adequately controlled on current medication. Denies headache, chest pain, and SOB.  Continue on losartan, carvedilol, and spironolactone.    2. Hyperlipidemia, unspecified hyperlipidemia type  He has been tolerating the statin. Denies muscle pain LFTs has been normal  Continue on lipitor 40 mg daily.    3. Hypothyroidism, unspecified type  Last lipid panel showed high free T4, and low normal TSH.  Will continue levothyroxine 88 mcg daily except Sunday.  Will continue follow up levels.    - TSH+FREE T4  4. Abnormal liver enzymes  Slightly high AST ( 62), normal ALT, and alkaline phosphatase. Patient has been asymptomatic.  Will continue lipitor 40 mg.       "

## 2017-08-11 NOTE — MR AVS SNAPSHOT
"        Evie Morillo   2017 9:00 AM   Office Visit   MRN: [id number]    Department:  [address] [place] [address]   Dept Phone:  [phone]    Description:  Female : 1945   Provider:  Elliott Aguayo M.D.           Reason for Visit     Follow-Up 3 month check up, lab results      Allergies as of 2017     Allergen Noted Reactions    Pcn [Penicillins] 2016         You were diagnosed with     Essential hypertension   [4007615]       Hyperlipidemia, unspecified hyperlipidemia type   [4888112]       Hypothyroidism, unspecified type   [0294558]       Elevated liver enzymes   [264295]         Vital Signs     Blood Pressure Pulse Temperature Respirations Height Weight    116/68 mmHg 63 36.6 °C (97.9 °F) 14 1.676 m (5' 5.98\") 69.4 kg (153 lb)    Body Mass Index Oxygen Saturation Smoking Status             24.71 kg/m2 94% Current Every Day Smoker         Basic Information     Date Of Birth Sex Race Ethnicity Preferred Language    1945 Female White Non- English      Your appointments     2017  9:00 AM   Established Patient with Elliott Aguayo M.D.   [facility] [address]    [address] [facility] [address]  [facility] [address] [phone]           You will be receiving a confirmation call a few days before your appointment from our automated call confirmation system.              Problem List              ICD-10-CM Priority Class Noted - Resolved    CAD (coronary artery disease) I25.10   10/4/2012 - Present    Hyperlipidemia E78.5   10/4/2012 - Present    Hypertension I10   10/4/2012 - Present    TIA (transient ischemic attack) G45.9   10/4/2012 - Present    Depression F32.9   2013 - Present    Hypothyroid E03.9   10/17/2013 - Present    Postural hypotension I95.1   10/7/2014 - Present    ADRIANNA on CPAP G47.33, Z99.89   2015 - Present    Pain R52   2016 - Present    AAA (abdominal aortic aneurysm) (CMS-HCC) I71.4   2016 - Present   " Tobacco abuse Z72.0   4/20/2016 - Present    Chronic insomnia F51.04   5/23/2016 - Present      Health Maintenance        Date Due Completion Dates    IMM ZOSTER VACCINE 6/24/2003 ---    BONE DENSITY 4/29/2017 4/29/2012 (Done)    Override on 4/29/2012: Done    COLON CANCER SCREENING ANNUAL FIT 9/6/2017 9/6/2016, 9/23/2014    IMM DTaP/Tdap/Td Vaccine (1 - Tdap) 4/21/2022 (Originally 4/22/2012) 4/21/2012    IMM INFLUENZA (1) 9/1/2017 12/8/2015    MAMMOGRAM 9/6/2017 9/6/2016 (Declined), 2/25/2015 (Declined), 10/29/2013 (Declined), 10/29/2010 (Done)    Override on 9/6/2016: Patient Declined    Override on 2/25/2015: Patient Declined    Override on 10/29/2013: Patient Declined    Override on 10/29/2010: Done    COLONOSCOPY 9/6/2021 9/6/2016 (Declined), 10/29/2010 (Done)    Override on 9/6/2016: Patient Declined    Override on 10/29/2010: Done (polyps)            Current Immunizations     13-VALENT PCV PREVNAR 12/8/2015    Influenza Vaccine Adult HD 12/8/2015    Pneumococcal polysaccharide vaccine (PPSV-23) 8/29/2013    TD Vaccine 4/21/2012      Below and/or attached are the medications your provider expects you to take. Review all of your home medications and newly ordered medications with your provider and/or pharmacist. Follow medication instructions as directed by your provider and/or pharmacist. Please keep your medication list with you and share with your provider. Update the information when medications are discontinued, doses are changed, or new medications (including over-the-counter products) are added; and carry medication information at all times in the event of emergency situations     Allergies:  PCN - (reactions not documented)               Medications  Valid as of: August 11, 2017 -  9:15 AM    Generic Name Brand Name Tablet Size Instructions for use    Aspirin (Tab)  MG Take 325 mg by mouth every day.        Atorvastatin Calcium (Tab) LIPITOR 80 MG Take 1 Tab by mouth every day.        Carvedilol  (Tab) COREG 6.25 MG Take 1 Tab by mouth 2 times a day, with meals.        Escitalopram Oxalate (Tab) LEXAPRO 20 MG TAKE 1 TABLET BY MOUTH EVERY DAY        Levothyroxine Sodium (Tab) SYNTHROID 88 MCG Take 1 Tab by mouth Every morning on an empty stomach.        Losartan Potassium (Tab) COZAAR 100 MG TAKE 1 TABLET BY MOUTH EVERY DAY        Spironolactone (Tab) ALDACTONE 50 MG TAKE 1 TABLET BY MOUTH EVERY DAY        TraZODone HCl (Tab) DESYREL 150 MG TAKE 1 TABLET BY MOUTH EVERY NIGHT AT BEDTIME AS NEEDED FOR SLEEP        .                 Medicines prescribed today were sent to:     [id number]  [place], [place] - [address] [facility] [address]    [address] [place] [address]    Phone: [phone] Fax: [phone]    Open 24 Hours?: No      Medication refill instructions:       If your prescription bottle indicates you have medication refills left, it is not necessary to call your provider’s office. Please contact your pharmacy and they will refill your medication.    If your prescription bottle indicates you do not have any refills left, you may request refills at any time through one of the following ways: The online [employer] system (except Urgent Care), by calling your provider’s office, or by asking your pharmacy to contact your provider’s office with a refill request. Medication refills are processed only during regular business hours and may not be available until the next business day. Your provider may request additional information or to have a follow-up visit with you prior to refilling your medication.   *Please Note: Medication refills are assigned a new Rx number when refilled electronically. Your pharmacy may indicate that no refills were authorized even though a new prescription for the same medication is available at the pharmacy. Please request the medicine by name with the pharmacy before contacting your provider for a refill.           MyChart Status: Patient  Declined        Quit Tobacco Information     Do you want to quit using tobacco?    Quitting tobacco decreases risks of cancer, heart and lung disease, increases life expectancy, improves sense of taste and smell, and increases spending money, among other benefits.    If you are thinking about quitting, we can help.  • [facility] Quit Tobacco Program: [phone]  o Program occurs weekly for four weeks and includes pharmacist consultation on products to support quitting smoking or chewing tobacco. A provider referral is needed for pharmacist consultation.  • Tobacco Users Help Hotline: 7-292-QUIT-NOW (073-9458) or https://[place].quitlogix.org/  o Free, confidential telephone and online coaching for [place] residents. Sessions are designed on a schedule that is convenient for you. Eligible clients receive free nicotine replacement therapy.  • Nationally: www.smokefree.gov  o Information and professional assistance to support both immediate and long-term needs as you become, and remain, a non-smoker. Smokefree.gov allows you to choose the help that best fits your needs.

## 2017-10-06 ENCOUNTER — TELEPHONE (OUTPATIENT)
Dept: MEDICAL GROUP | Facility: MEDICAL CENTER | Age: 72
End: 2017-10-06

## 2017-10-06 ENCOUNTER — TELEPHONE (OUTPATIENT)
Dept: CARDIOLOGY | Facility: MEDICAL CENTER | Age: 72
End: 2017-10-06

## 2017-10-06 DIAGNOSIS — G47.33 OSA (OBSTRUCTIVE SLEEP APNEA): ICD-10-CM

## 2017-10-06 NOTE — TELEPHONE ENCOUNTER
----- Message from Berna Silver sent at 10/6/2017  1:38 PM PDT -----  Regarding: Patient needs to get CPAP machine  ANTHONY/Heide    Patient needs to get a CPAP machine and wants a call back at [phone].

## 2017-10-06 NOTE — TELEPHONE ENCOUNTER
1. Caller Name: Evie Morillo                                         Call Back Number: [phone] (home)       Patient approves a detailed voicemail message: yes    2. What is being requested? Patient would like a referral for a sleep study

## 2017-10-06 NOTE — TELEPHONE ENCOUNTER
Spoke with pt, pt reports she needed a new CPAP and wanted [facility] to order it, advise pt that she has to call the MD that originally prescribed it, per pt it was prescribed by Dr Nielson in [place], advise pt to call PCP and see if they will be able to help her on it, if they are unable, please give us a call back, pt agreed and verbalizes understanding

## 2017-10-09 RX ORDER — ESCITALOPRAM OXALATE 20 MG/1
20 TABLET ORAL DAILY
Qty: 90 TAB | Refills: 1 | Status: SHIPPED | OUTPATIENT
Start: 2017-10-09 | End: 2018-02-05 | Stop reason: SDUPTHER

## 2017-11-06 ENCOUNTER — HOSPITAL ENCOUNTER (OUTPATIENT)
Dept: LAB | Facility: MEDICAL CENTER | Age: 72
End: 2017-11-06
Attending: FAMILY MEDICINE
Payer: MEDICARE

## 2017-11-06 LAB
T4 FREE SERPL-MCNC: 1.17 NG/DL (ref 0.53–1.43)
TSH SERPL DL<=0.005 MIU/L-ACNC: 1.34 UIU/ML (ref 0.3–3.7)

## 2017-11-06 PROCEDURE — 84443 ASSAY THYROID STIM HORMONE: CPT

## 2017-11-06 PROCEDURE — 36415 COLL VENOUS BLD VENIPUNCTURE: CPT

## 2017-11-06 PROCEDURE — 84439 ASSAY OF FREE THYROXINE: CPT

## 2017-11-13 ENCOUNTER — OFFICE VISIT (OUTPATIENT)
Dept: MEDICAL GROUP | Facility: MEDICAL CENTER | Age: 72
End: 2017-11-13
Payer: MEDICARE

## 2017-11-13 VITALS
OXYGEN SATURATION: 97 % | DIASTOLIC BLOOD PRESSURE: 80 MMHG | WEIGHT: 150 LBS | HEIGHT: 66 IN | RESPIRATION RATE: 16 BRPM | BODY MASS INDEX: 24.11 KG/M2 | TEMPERATURE: 98.7 F | HEART RATE: 62 BPM | SYSTOLIC BLOOD PRESSURE: 128 MMHG

## 2017-11-13 DIAGNOSIS — E03.4 HYPOTHYROIDISM DUE TO ACQUIRED ATROPHY OF THYROID: ICD-10-CM

## 2017-11-13 DIAGNOSIS — Z00.00 HEALTHCARE MAINTENANCE: ICD-10-CM

## 2017-11-13 DIAGNOSIS — R05.9 COUGH: ICD-10-CM

## 2017-11-13 DIAGNOSIS — I10 ESSENTIAL HYPERTENSION: ICD-10-CM

## 2017-11-13 DIAGNOSIS — E78.2 MIXED HYPERLIPIDEMIA: ICD-10-CM

## 2017-11-13 PROCEDURE — 99214 OFFICE O/P EST MOD 30 MIN: CPT | Performed by: FAMILY MEDICINE

## 2017-11-13 NOTE — PROGRESS NOTES
CC: Cough and running nose/ chronic medical issues.    HPI:   Evie presents today to discuss the following medical issues:    Hypothyroidism, she has been tolerating Levothyroxine, no palpitation, no cold or heat intolerance. Has been on levothyroxine 88 mcg daily.No side effects.A recent TSH, and free T4 were normal.    Essential hypertension, BP has been adequately controlled on current medication. Denies headache, chest pain, and SOB.has been on losartan 100 mg daily, and Carvedilol 6.25 mg bid.No side effects.    Mixed hyperlipidemia, she has been tolerating the statin. Denies muscle pain LFTs has been normal, has been on lipitor 80 mg daily.    Patient has been having running nose and cough for 10 days, denies fever, chills, denies chest pain, and SOB. Has been having a clear running nose, and dry cough, she has not had a fever, but sometimes she feels that she is cold. Recent TSH , and curtis T4 were normal.    Decline mammogram, asked to ne excluded from her HM.        Patient Active Problem List    Diagnosis Date Noted   • Chronic insomnia 05/23/2016   • AAA (abdominal aortic aneurysm) (CMS-HCC) 04/20/2016   • Tobacco abuse 04/20/2016   • Pain 02/23/2016   • ADRIANNA on CPAP 02/25/2015   • Postural hypotension 10/07/2014   • Hypothyroid 10/17/2013   • Depression 09/26/2013   • CAD (coronary artery disease) 10/04/2012   • Hyperlipidemia 10/04/2012   • Hypertension 10/04/2012   • TIA (transient ischemic attack) 10/04/2012       Current Outpatient Prescriptions   Medication Sig Dispense Refill   • escitalopram (LEXAPRO) 20 MG tablet Take 1 Tab by mouth every day. 90 Tab 1   • losartan (COZAAR) 100 MG Tab TAKE 1 TABLET BY MOUTH EVERY DAY 90 Tab 3   • carvedilol (COREG) 6.25 MG Tab Take 1 Tab by mouth 2 times a day, with meals. 180 Tab 0   • atorvastatin (LIPITOR) 80 MG tablet Take 1 Tab by mouth every day. 90 Tab 2   • spironolactone (ALDACTONE) 50 MG Tab TAKE 1 TABLET BY MOUTH EVERY DAY 90 Tab 1   • trazodone  "(DESYREL) 150 MG Tab TAKE 1 TABLET BY MOUTH EVERY NIGHT AT BEDTIME AS NEEDED FOR SLEEP 90 Tab 1   • levothyroxine (SYNTHROID) 88 MCG Tab Take 1 Tab by mouth Every morning on an empty stomach. 90 Tab 1   • aspirin (ASA) 325 MG TABS Take 325 mg by mouth every day.       No current facility-administered medications for this visit.          Allergies as of 11/13/2017 - Reviewed 11/13/2017   Allergen Reaction Noted   • Pcn [penicillins]  06/17/2016        ROS: Denies any chest pain, Shortness of breath, Changes bowel or bladder, Lower extremity edema.    Physical Exam:  /80   Pulse 62   Temp 37.1 °C (98.7 °F)   Resp 16   Ht 1.676 m (5' 6\")   Wt 68 kg (150 lb)   SpO2 97%   BMI 24.21 kg/m²   Gen.: Well-developed, well-nourished, no apparent distress,pleasant and cooperative with the examination  Skin:  Warm and dry with good turgor. No rashes or suspicious lesions in visible areas  HEENT:Sinuses nontender with palpation, TMs clear, nares patent with pink mucosa and clear rhinorrhea,no septal deviation ,polyps or lesions. lips without lesions, oropharynx clear.  Neck: Trachea midline,no masses or adenopathy. No JVD.  Cor: Regular rate and rhythm without murmur, gallop or rub.  Lungs: Respirations unlabored.Clear to auscultation with equal breath sounds bilaterally. No wheezes, rhonchi.  Extremities: No cyanosis, clubbing or edema.      Assessment and Plan.   72 y.o. female     1. Hypothyroidism due to acquired atrophy of thyroid  He has been tolerating Levothyroxine, no palpitation, no cold or heat intolerance  Continue on levothyroxine 88 mcg daily.    2. Essential hypertension  Has been adequately controlled on current medication. Denies headache, chest pain, and SOB.  Continue on losartan 100 mg daily, and Carvedilol 6.25 mg bid.    3. Mixed hyperlipidemia  He has been tolerating the statin. Denies muscle pain LFTs has been normal  Continue on lipitor 80 mg daily.    4. Cough/ running nose  Probably a viral " URI.  Rest.  Cough drops or otc antitussive  Hydration.  Normal saline nasal drops  Tylenol as needed.    5. Healthcare maintenance  Decline mammogram, asked to ne excluded from her HM.

## 2017-11-28 RX ORDER — SPIRONOLACTONE 50 MG/1
TABLET, FILM COATED ORAL
Qty: 90 TAB | Refills: 0 | Status: SHIPPED | OUTPATIENT
Start: 2017-11-28 | End: 2018-05-23 | Stop reason: SDUPTHER

## 2017-12-18 ENCOUNTER — APPOINTMENT (OUTPATIENT)
Dept: SLEEP MEDICINE | Facility: MEDICAL CENTER | Age: 72
End: 2017-12-18
Payer: MEDICARE

## 2017-12-18 DIAGNOSIS — E03.9 HYPOTHYROIDISM, UNSPECIFIED TYPE: ICD-10-CM

## 2017-12-18 RX ORDER — LEVOTHYROXINE SODIUM 88 UG/1
TABLET ORAL
Qty: 90 TAB | Refills: 2 | Status: SHIPPED | OUTPATIENT
Start: 2017-12-18 | End: 2018-05-23

## 2018-01-03 RX ORDER — CARVEDILOL 6.25 MG/1
TABLET ORAL
Qty: 180 TAB | Refills: 3 | Status: SHIPPED | OUTPATIENT
Start: 2018-01-03 | End: 2018-04-09 | Stop reason: SDUPTHER

## 2018-01-18 DIAGNOSIS — F32.A DEPRESSION, UNSPECIFIED DEPRESSION TYPE: ICD-10-CM

## 2018-01-18 RX ORDER — TRAZODONE HYDROCHLORIDE 150 MG/1
TABLET ORAL
Qty: 60 TAB | Refills: 10 | Status: SHIPPED | OUTPATIENT
Start: 2018-01-18 | End: 2018-10-30 | Stop reason: SDUPTHER

## 2018-01-29 ENCOUNTER — HOSPITAL ENCOUNTER (OUTPATIENT)
Dept: LAB | Facility: MEDICAL CENTER | Age: 73
End: 2018-01-29
Attending: FAMILY MEDICINE
Payer: MEDICARE

## 2018-01-29 ENCOUNTER — HOSPITAL ENCOUNTER (OUTPATIENT)
Dept: LAB | Facility: MEDICAL CENTER | Age: 73
End: 2018-01-29
Attending: SURGERY
Payer: MEDICARE

## 2018-01-29 LAB
BUN SERPL-MCNC: 20 MG/DL (ref 8–22)
CREAT SERPL-MCNC: 0.92 MG/DL (ref 0.5–1.4)
T4 FREE SERPL-MCNC: 0.92 NG/DL (ref 0.53–1.43)
TSH SERPL DL<=0.005 MIU/L-ACNC: 4.45 UIU/ML (ref 0.38–5.33)

## 2018-01-29 PROCEDURE — 84443 ASSAY THYROID STIM HORMONE: CPT

## 2018-01-29 PROCEDURE — 36415 COLL VENOUS BLD VENIPUNCTURE: CPT

## 2018-01-29 PROCEDURE — 84520 ASSAY OF UREA NITROGEN: CPT

## 2018-01-29 PROCEDURE — 84439 ASSAY OF FREE THYROXINE: CPT

## 2018-01-29 PROCEDURE — 82565 ASSAY OF CREATININE: CPT

## 2018-01-30 ENCOUNTER — HOSPITAL ENCOUNTER (OUTPATIENT)
Dept: RADIOLOGY | Facility: MEDICAL CENTER | Age: 73
End: 2018-01-30
Attending: SURGERY
Payer: MEDICARE

## 2018-01-30 DIAGNOSIS — I71.40 ABDOMINAL AORTIC ANEURYSM WITHOUT RUPTURE (HCC): ICD-10-CM

## 2018-01-30 PROCEDURE — 700117 HCHG RX CONTRAST REV CODE 255: Performed by: SURGERY

## 2018-01-30 PROCEDURE — 74174 CTA ABD&PLVS W/CONTRAST: CPT

## 2018-01-30 RX ADMIN — IOHEXOL 100 ML: 350 INJECTION, SOLUTION INTRAVENOUS at 09:43

## 2018-02-05 RX ORDER — ESCITALOPRAM OXALATE 20 MG/1
TABLET ORAL
Qty: 90 TAB | Refills: 2 | Status: SHIPPED | OUTPATIENT
Start: 2018-02-05 | End: 2018-10-22 | Stop reason: SDUPTHER

## 2018-02-06 ENCOUNTER — OFFICE VISIT (OUTPATIENT)
Dept: MEDICAL GROUP | Facility: MEDICAL CENTER | Age: 73
End: 2018-02-06
Payer: MEDICARE

## 2018-02-06 VITALS
RESPIRATION RATE: 16 BRPM | WEIGHT: 150 LBS | SYSTOLIC BLOOD PRESSURE: 120 MMHG | TEMPERATURE: 97 F | HEART RATE: 55 BPM | HEIGHT: 66 IN | OXYGEN SATURATION: 95 % | BODY MASS INDEX: 24.11 KG/M2 | DIASTOLIC BLOOD PRESSURE: 60 MMHG

## 2018-02-06 DIAGNOSIS — Z12.11 COLON CANCER SCREENING: ICD-10-CM

## 2018-02-06 DIAGNOSIS — I71.40 ABDOMINAL AORTIC ANEURYSM (AAA) WITHOUT RUPTURE (HCC): ICD-10-CM

## 2018-02-06 DIAGNOSIS — E78.2 MIXED HYPERLIPIDEMIA: ICD-10-CM

## 2018-02-06 DIAGNOSIS — I10 ESSENTIAL HYPERTENSION: ICD-10-CM

## 2018-02-06 DIAGNOSIS — E03.4 HYPOTHYROIDISM DUE TO ACQUIRED ATROPHY OF THYROID: ICD-10-CM

## 2018-02-06 DIAGNOSIS — R05.9 COUGH: ICD-10-CM

## 2018-02-06 PROCEDURE — 99214 OFFICE O/P EST MOD 30 MIN: CPT | Performed by: FAMILY MEDICINE

## 2018-02-06 RX ORDER — GUAIFENESIN AND DEXTROMETHORPHAN HYDROBROMIDE 100; 10 MG/5ML; MG/5ML
10 SOLUTION ORAL EVERY 6 HOURS PRN
Qty: 560 ML | Refills: 0 | Status: SHIPPED | OUTPATIENT
Start: 2018-02-06 | End: 2018-05-22

## 2018-02-06 NOTE — PROGRESS NOTES
CC: Cough/ HTN/Thyroid disorder/ others.    HPI:   Evie presents today to discuss the following medical issues:    Abdominal aortic aneurysm (AAA) without rupture (CMS-HCC)  Most recent abdminal CT showed increase in size ( now is 4.7X 4.7 cm), however has been asymptomatic, and her BP has been adequately controlled.Already seen by Vascular surgeon Dr Beaver decided no indication for surgery.    Hypothyroidism due to acquired atrophy of thyroid  She has been tolerating Levothyroxine, no palpitation, no cold or heat intolerance, has been on levothyroxine 88 mcg daily    Essential hypertension  Has been adequately controlled on current medication. Denies headache, chest pain, and SOB.Has been on Losartan 100 mg daily.    Mixed hyperlipidemia  He has been tolerating the statin. Denies muscle pain LFTs has been normal, has been on Lipitor 80 mg daily.    Cough  Has been having intermittent cough started 4 days ago, has gotten better, associated with running nose, not associated with phlegm, or shortness of breath. Denies throat pain fever,chills, and body ache.    Due for colon cancer screening      Patient Active Problem List    Diagnosis Date Noted   • Chronic insomnia 05/23/2016   • AAA (abdominal aortic aneurysm) (CMS-HCC) 04/20/2016   • Tobacco abuse 04/20/2016   • Pain 02/23/2016   • ADRIANNA on CPAP 02/25/2015   • Postural hypotension 10/07/2014   • Hypothyroid 10/17/2013   • Depression 09/26/2013   • CAD (coronary artery disease) 10/04/2012   • Hyperlipidemia 10/04/2012   • Hypertension 10/04/2012   • TIA (transient ischemic attack) 10/04/2012       Current Outpatient Prescriptions   Medication Sig Dispense Refill   • Dextromethorphan-Guaifenesin (TUSSIN DM)  MG/5ML Syrup Take 10 mL by mouth every 6 hours as needed. 560 mL 0   • escitalopram (LEXAPRO) 20 MG tablet TAKE 1 TABLET BY MOUTH EVERY DAY 90 Tab 2   • trazodone (DESYREL) 150 MG Tab TAKE ONE TABLET BY MOUTH TWICE A DAY 60 Tab 10   • carvedilol (COREG)  "6.25 MG Tab TAKE 1 TABLET BY MOUTH TWICE DAILY 180 Tab 3   • levothyroxine (SYNTHROID) 88 MCG Tab TAKE ONE TABLET BY MOUTH DAILY 90 Tab 2   • spironolactone (ALDACTONE) 50 MG Tab TAKE 1 TABLET BY MOUTH EVERY DAY 90 Tab 0   • losartan (COZAAR) 100 MG Tab TAKE 1 TABLET BY MOUTH EVERY DAY 90 Tab 3   • atorvastatin (LIPITOR) 80 MG tablet Take 1 Tab by mouth every day. 90 Tab 2   • aspirin (ASA) 325 MG TABS Take 325 mg by mouth every day.       No current facility-administered medications for this visit.          Allergies as of 02/06/2018 - Reviewed 02/06/2018   Allergen Reaction Noted   • Pcn [penicillins]  06/17/2016        ROS: Denies any chest pain, Shortness of breath, Changes bowel or bladder, Lower extremity edema.    Physical Exam:  /60   Pulse (!) 55   Temp 36.1 °C (97 °F)   Resp 16   Ht 1.676 m (5' 6\")   Wt 68 kg (150 lb)   SpO2 95%   BMI 24.21 kg/m²   Gen.: Well-developed, well-nourished, no apparent distress,pleasant and cooperative with the examination  Skin:  Warm and dry with good turgor. No rashes or suspicious lesions in visible areas  HEENT:Sinuses nontender with palpation, TMs clear, nares patent with pink mucosa and clear rhinorrhea,no septal deviation ,polyps or lesions. lips without lesions, oropharynx clear.  Neck: Trachea midline,no masses or adenopathy. No JVD.  Cor: Regular rate and rhythm without murmur, gallop or rub.  Lungs: Respirations unlabored.Clear to auscultation with equal breath sounds bilaterally. No wheezes, rhonchi.  Extremities: No cyanosis, clubbing or edema.      Assessment and Plan.   72 y.o. female     1. Abdominal aortic aneurysm (AAA) without rupture (CMS-HCC)  Increased in size ( now is 4.7X 4.7 cm), however asymptomatic.Already seen by Vascular surgeon decided to weight  Continue follow up with Vascular surgery.    2. Hypothyroidism due to acquired atrophy of thyroid  He has been tolerating Levothyroxine, no palpitation, no cold or heat intolerance  Continue " on levothyroxine 88 mcg daily    3. Essential hypertension  Has been adequately controlled on current medication. Denies headache, chest pain, and SOB.  Continue on Losartan 100 mg daily.    4. Mixed hyperlipidemia  He has been tolerating the statin. Denies muscle pain LFTs has been normal  Continue on Lipitor 80 mg daily.    5. Colon cancer screening    - OCCULT BLOOD FECES IMMUNOASSAY (FIT); Future    6. Cough  Viral URI.  Symptomatic and supportive treatment:  A. Drink plenty of fluids to keep yourself hydrated. Warm fluids like tea and hot soup are better.  B. Increase humidity in the house with a humidifier or place your head over a steaming pot.  C. Use Tylenol or Motrin for aches, pains, or fever.  D. Get plenty of rest to allow your body time to heal  E. Cough suppressant  G. Contact the office or return for appt. if you develope worsening symptoms or do not improve in the next week.  H. Avoid lung irritants such as tobacco smoke or blowing dust.+    - Dextromethorphan-Guaifenesin (TUSSIN DM)  MG/5ML Syrup; Take 10 mL by mouth every 6 hours as needed.  Dispense: 560 mL; Refill: 0

## 2018-02-07 ENCOUNTER — HOSPITAL ENCOUNTER (OUTPATIENT)
Facility: MEDICAL CENTER | Age: 73
End: 2018-02-07
Attending: FAMILY MEDICINE
Payer: MEDICARE

## 2018-02-07 PROCEDURE — 82274 ASSAY TEST FOR BLOOD FECAL: CPT

## 2018-02-12 RX ORDER — LOSARTAN POTASSIUM 100 MG/1
TABLET ORAL
Qty: 90 TAB | Refills: 3 | Status: SHIPPED | OUTPATIENT
Start: 2018-02-12 | End: 2019-01-22 | Stop reason: SDUPTHER

## 2018-02-13 DIAGNOSIS — Z12.11 COLON CANCER SCREENING: ICD-10-CM

## 2018-02-15 LAB — HEMOCCULT STL QL IA: NEGATIVE

## 2018-02-20 ENCOUNTER — TELEPHONE (OUTPATIENT)
Dept: MEDICAL GROUP | Facility: MEDICAL CENTER | Age: 73
End: 2018-02-20

## 2018-02-20 ENCOUNTER — OFFICE VISIT (OUTPATIENT)
Dept: URGENT CARE | Facility: PHYSICIAN GROUP | Age: 73
End: 2018-02-20
Payer: MEDICARE

## 2018-02-20 VITALS
HEART RATE: 66 BPM | WEIGHT: 145 LBS | BODY MASS INDEX: 23.3 KG/M2 | TEMPERATURE: 99.5 F | HEIGHT: 66 IN | SYSTOLIC BLOOD PRESSURE: 114 MMHG | OXYGEN SATURATION: 94 % | DIASTOLIC BLOOD PRESSURE: 62 MMHG

## 2018-02-20 DIAGNOSIS — J98.8 RTI (RESPIRATORY TRACT INFECTION): ICD-10-CM

## 2018-02-20 PROCEDURE — 99214 OFFICE O/P EST MOD 30 MIN: CPT | Performed by: PHYSICIAN ASSISTANT

## 2018-02-20 RX ORDER — AZITHROMYCIN 250 MG/1
TABLET, FILM COATED ORAL
Qty: 6 TAB | Refills: 0 | Status: SHIPPED | OUTPATIENT
Start: 2018-02-20 | End: 2018-05-22

## 2018-02-20 ASSESSMENT — ENCOUNTER SYMPTOMS
SORE THROAT: 1
CARDIOVASCULAR NEGATIVE: 1
CHILLS: 0
WHEEZING: 0
HEADACHES: 1
DIZZINESS: 0
SINUS PAIN: 0
GASTROINTESTINAL NEGATIVE: 1
MUSCULOSKELETAL NEGATIVE: 1
FEVER: 0
SPUTUM PRODUCTION: 1
SHORTNESS OF BREATH: 0
COUGH: 1

## 2018-02-20 ASSESSMENT — COPD QUESTIONNAIRES: COPD: 1

## 2018-02-20 NOTE — PROGRESS NOTES
Subjective:      Evie Morillo is a 72 y.o. female who presents with Cough (Fatigue x 5 days )            Cough   This is a new problem. The current episode started in the past 7 days. The problem has been unchanged. The problem occurs every few minutes. The cough is productive of sputum. Associated symptoms include ear congestion, headaches, nasal congestion and a sore throat. Pertinent negatives include no chills, ear pain, fever, shortness of breath or wheezing. She has tried OTC cough suppressant for the symptoms. The treatment provided mild relief. Her past medical history is significant for bronchitis and COPD. There is no history of asthma or pneumonia.     PMH:  has a past medical history of AAA (abdominal aortic aneurysm) (CMS-HCC); Aortic aneurysm (CMS-HCC) (10/4/2012); CAD (coronary artery disease) (10/4/2012); Depression (9/26/2013); Diverticula of colon; Hyperlipidemia (10/4/2012); Hypertension; Thyroid disease; and TIA (transient ischemic attack) (10/4/2012). She also has no past medical history of CHF (congestive heart failure) (CMS-HCC) or Encounter for long-term (current) use of other medications.  MEDS:   Current Outpatient Prescriptions:   •  losartan (COZAAR) 100 MG Tab, TAKE ONE TABLET BY MOUTH DAILY, Disp: 90 Tab, Rfl: 3  •  Dextromethorphan-Guaifenesin (TUSSIN DM)  MG/5ML Syrup, Take 10 mL by mouth every 6 hours as needed., Disp: 560 mL, Rfl: 0  •  escitalopram (LEXAPRO) 20 MG tablet, TAKE 1 TABLET BY MOUTH EVERY DAY, Disp: 90 Tab, Rfl: 2  •  trazodone (DESYREL) 150 MG Tab, TAKE ONE TABLET BY MOUTH TWICE A DAY, Disp: 60 Tab, Rfl: 10  •  carvedilol (COREG) 6.25 MG Tab, TAKE 1 TABLET BY MOUTH TWICE DAILY, Disp: 180 Tab, Rfl: 3  •  levothyroxine (SYNTHROID) 88 MCG Tab, TAKE ONE TABLET BY MOUTH DAILY, Disp: 90 Tab, Rfl: 2  •  spironolactone (ALDACTONE) 50 MG Tab, TAKE 1 TABLET BY MOUTH EVERY DAY, Disp: 90 Tab, Rfl: 0  •  atorvastatin (LIPITOR) 80 MG tablet, Take 1 Tab by mouth every  "day., Disp: 90 Tab, Rfl: 2  •  aspirin (ASA) 325 MG TABS, Take 325 mg by mouth every day., Disp: , Rfl:   ALLERGIES:   Allergies   Allergen Reactions   • Pcn [Penicillins]      SURGHX:   Past Surgical History:   Procedure Laterality Date   • STENT PLACEMENT  2008    Dr Can in [place]     SOCHX:  reports that she has been smoking Cigarettes.  She has a 10.00 pack-year smoking history. She has never used smokeless tobacco. She reports that she does not drink alcohol or use drugs.  FH: family history includes Cancer in her maternal grandfather and maternal grandmother; Diabetes in her maternal grandmother; Heart Attack in her father and mother; Heart Disease in her father and mother; Stroke in her paternal grandmother.      Review of Systems   Constitutional: Negative for chills and fever.   HENT: Positive for sore throat. Negative for congestion, ear pain and sinus pain.    Respiratory: Positive for cough and sputum production. Negative for shortness of breath and wheezing.    Cardiovascular: Negative.    Gastrointestinal: Negative.    Musculoskeletal: Negative.    Neurological: Positive for headaches. Negative for dizziness.       Medications, Allergies, and current problem list reviewed today in Epic     Objective:     /62   Pulse 66   Temp 37.5 °C (99.5 °F)   Ht 1.676 m (5' 6\")   Wt 65.8 kg (145 lb)   SpO2 94%   BMI 23.40 kg/m²      Physical Exam   Constitutional: She is oriented to person, place, and time. She appears well-developed and well-nourished. No distress.   HENT:   Head: Normocephalic and atraumatic.   Right Ear: Tympanic membrane and external ear normal.   Left Ear: Tympanic membrane and external ear normal.   Nose: Nose normal.   Mouth/Throat: Oropharynx is clear and moist. No oropharyngeal exudate.   Eyes: Conjunctivae and EOM are normal. Pupils are equal, round, and reactive to light. Right eye exhibits no discharge. Left eye exhibits no discharge.   Neck: Normal range of motion. " Neck supple.   Cardiovascular: Normal rate, regular rhythm and normal heart sounds.    Pulmonary/Chest: Effort normal and breath sounds normal. No respiratory distress. She has no wheezes. She has no rales.   Musculoskeletal: Normal range of motion.   Lymphadenopathy:     She has no cervical adenopathy.   Neurological: She is alert and oriented to person, place, and time.   Skin: Skin is warm and dry. She is not diaphoretic.   Psychiatric: She has a normal mood and affect. Her behavior is normal. Judgment and thought content normal.   Nursing note and vitals reviewed.              Assessment/Plan:     1. RTI (respiratory tract infection)  azithromycin (ZITHROMAX) 250 MG Tab     PO2 adequate. Respiratory exam unremarkable.  Patient was given a contingent antibiotic prescription to fill and use as directed if symptoms progressed as discussed and agreed upon.  OTC meds and conservative measures as discussed  Return to clinic or go to ED if symptoms worsen or persist. Indications for ED discussed at length. Patient voices understanding. Follow-up with your primary care provider in 3-5 days. Red flags discussed.    Please note that this dictation was created using voice recognition software. I have made every reasonable attempt to correct obvious errors, but I expect that there are errors of grammar and possibly content that I did not discover before finalizing the note.

## 2018-02-20 NOTE — TELEPHONE ENCOUNTER
VOICEMAIL  1. Caller Name: Evie Morillo  [phone] (home)       2. Message: patient is experiencing a cough which is cause her to throw up every morning and is also having eye pain and a lot of gas. She wants to know what you would recommend?    3. Patient approves office to leave a detailed voicemail/MyChart message: yes

## 2018-02-26 ENCOUNTER — TELEPHONE (OUTPATIENT)
Dept: MEDICAL GROUP | Facility: MEDICAL CENTER | Age: 73
End: 2018-02-26

## 2018-02-26 NOTE — TELEPHONE ENCOUNTER
Phone Number Called: [phone] (home)     Message: patient was given azithromycin and had a bad reaction and is experiencing  diarrhea and so she stopped taking it and is now feeling weak     Left Message for patient to call back: yes

## 2018-03-01 ENCOUNTER — TELEPHONE (OUTPATIENT)
Dept: MEDICAL GROUP | Facility: MEDICAL CENTER | Age: 73
End: 2018-03-01

## 2018-03-01 NOTE — TELEPHONE ENCOUNTER
VOICEMAIL  1. Caller Name: Evie Morillo                        Call Back Number: [phone] (home)       2. Message: Patient called asking if she could get a prescription for a nebulizer she is having a hard time breathing    3. Patient approves office to leave a detailed voicemail/MyChart message: yes

## 2018-03-02 ENCOUNTER — TELEPHONE (OUTPATIENT)
Dept: CARDIOLOGY | Facility: MEDICAL CENTER | Age: 73
End: 2018-03-02

## 2018-03-02 NOTE — TELEPHONE ENCOUNTER
Probably patient need to be evaluated. She has no h/o asthma or COPD. If she needs the nebulizer she probably needs oral steroid, so that is why she need to be evaluated.

## 2018-03-02 NOTE — TELEPHONE ENCOUNTER
new PCP recommendations   Received: Today   Message Contents   Antoinette Oneal R.N.   Phone Number: [phone]             ANTHONY/nathaly     Pt calling to ask TW for recommendations for a new PCP. Pt is very very dissatisfied with Dr Aguayo.     Please call pt to advise, [phone].      To Dr. Pulido for recommendations      ________________________________________________      Andre Pulido M.D.   You 5 minutes ago (3:05 PM)        Dr. Chung Phan or Dr. Gala Heredia are both good.,        ____________________________________________    Patient states she was not satisfied with Dr. Aguayo's care regarding her recent visit, she is currently battling a cold/respiratory virus.      Patient is going to establish care with Dr. Gala Heredia (based on location).  Encouraged patient to call to establish appointment and seek an urgent care if she shows no improvement with her illness.  Patient verbalizes understanding.

## 2018-04-09 ENCOUNTER — TELEPHONE (OUTPATIENT)
Dept: CARDIOLOGY | Facility: MEDICAL CENTER | Age: 73
End: 2018-04-09

## 2018-04-09 DIAGNOSIS — I10 ESSENTIAL HYPERTENSION: Primary | ICD-10-CM

## 2018-04-09 RX ORDER — CARVEDILOL 6.25 MG/1
6.25 TABLET ORAL 2 TIMES DAILY WITH MEALS
Qty: 60 TAB | Refills: 1 | OUTPATIENT
Start: 2018-04-09 | End: 2018-06-20 | Stop reason: SDUPTHER

## 2018-04-10 NOTE — TELEPHONE ENCOUNTER
Received refill request for pt's Carvedilol 6.25mg BID.    LM for pt to call and make appt with TW, advised would refill medication for 30 day supply.    Rx for Carvedilol 6.25mg BID called to pharmacy.

## 2018-05-04 ENCOUNTER — OFFICE VISIT (OUTPATIENT)
Dept: CARDIOLOGY | Facility: MEDICAL CENTER | Age: 73
End: 2018-05-04
Payer: MEDICARE

## 2018-05-04 VITALS
HEIGHT: 67 IN | OXYGEN SATURATION: 92 % | SYSTOLIC BLOOD PRESSURE: 118 MMHG | HEART RATE: 56 BPM | WEIGHT: 147 LBS | DIASTOLIC BLOOD PRESSURE: 66 MMHG | BODY MASS INDEX: 23.07 KG/M2

## 2018-05-04 DIAGNOSIS — E78.2 MIXED HYPERLIPIDEMIA: ICD-10-CM

## 2018-05-04 DIAGNOSIS — G47.33 OSA ON CPAP: ICD-10-CM

## 2018-05-04 DIAGNOSIS — I71.40 ABDOMINAL AORTIC ANEURYSM (AAA) WITHOUT RUPTURE (HCC): ICD-10-CM

## 2018-05-04 DIAGNOSIS — J30.89 SEASONAL ALLERGIC RHINITIS DUE TO OTHER ALLERGIC TRIGGER: ICD-10-CM

## 2018-05-04 DIAGNOSIS — I10 ESSENTIAL HYPERTENSION: ICD-10-CM

## 2018-05-04 DIAGNOSIS — G45.9 TRANSIENT CEREBRAL ISCHEMIA, UNSPECIFIED TYPE: ICD-10-CM

## 2018-05-04 DIAGNOSIS — Z72.0 TOBACCO ABUSE: ICD-10-CM

## 2018-05-04 DIAGNOSIS — I25.10 CORONARY ARTERY DISEASE INVOLVING NATIVE CORONARY ARTERY OF NATIVE HEART WITHOUT ANGINA PECTORIS: ICD-10-CM

## 2018-05-04 DIAGNOSIS — E03.8 OTHER SPECIFIED HYPOTHYROIDISM: ICD-10-CM

## 2018-05-04 PROCEDURE — 99213 OFFICE O/P EST LOW 20 MIN: CPT | Performed by: INTERNAL MEDICINE

## 2018-05-04 NOTE — PROGRESS NOTES
Chief Complaint   Patient presents with   • Coronary Artery Disease       Subjective:   Evie Morillo is a 72 y.o. female who presents today for follow-up of coronary artery disease hypertension as well as AAA.  Her AAA is followed by Dr. Beaver and is stable as of January 2018.  Blood pressures have been well controlled.  She has no complaints aside from fatigue.  This is in the context of seasonal allergies and a reduction in her thyroid dose.  She continues to smoke.    Denies any other cardiovascular symptoms including chest pain, shortness of breath, dyspnea on exertion, lightheadedness, syncope or presyncope, lower extremity edema, PND, orthopnea or palpitations.      Past Medical History:   Diagnosis Date   • AAA (abdominal aortic aneurysm) (HCC)    • Aortic aneurysm (HCC) 10/4/2012   • CAD (coronary artery disease) 10/4/2012    2 stents [place] 2009    • Depression 9/26/2013   • Diverticula of colon    • Hyperlipidemia 10/4/2012   • Hypertension    • Thyroid disease    • TIA (transient ischemic attack) 10/4/2012    2003      Past Surgical History:   Procedure Laterality Date   • STENT PLACEMENT  2008    Dr Can in [place]     Family History   Problem Relation Age of Onset   • Heart Disease Mother    • Heart Attack Mother    • Heart Disease Father    • Heart Attack Father    • Cancer Maternal Grandmother      stomach   • Diabetes Maternal Grandmother    • Cancer Maternal Grandfather      brain   • Stroke Paternal Grandmother      Social History     Social History   • Marital status:      Spouse name: N/A   • Number of children: N/A   • Years of education: N/A     Occupational History   • Not on file.     Social History Main Topics   • Smoking status: Current Every Day Smoker     Packs/day: 0.25     Years: 40.00     Types: Cigarettes   • Smokeless tobacco: Never Used      Comment: 3-4cigarettes daily, E CIG   • Alcohol use No   • Drug use: No   • Sexual activity: No     Other Topics Concern   •  "Not on file     Social History Narrative   • No narrative on file     Allergies   Allergen Reactions   • Pcn [Penicillins]      Outpatient Encounter Prescriptions as of 5/4/2018   Medication Sig Dispense Refill   • carvedilol (COREG) 6.25 MG Tab Take 1 Tab by mouth 2 times a day, with meals. Needs to be seen for further refills. Thank you 60 Tab 1   • losartan (COZAAR) 100 MG Tab TAKE ONE TABLET BY MOUTH DAILY 90 Tab 3   • Dextromethorphan-Guaifenesin (TUSSIN DM)  MG/5ML Syrup Take 10 mL by mouth every 6 hours as needed. 560 mL 0   • escitalopram (LEXAPRO) 20 MG tablet TAKE 1 TABLET BY MOUTH EVERY DAY 90 Tab 2   • trazodone (DESYREL) 150 MG Tab TAKE ONE TABLET BY MOUTH TWICE A DAY 60 Tab 10   • levothyroxine (SYNTHROID) 88 MCG Tab TAKE ONE TABLET BY MOUTH DAILY 90 Tab 2   • spironolactone (ALDACTONE) 50 MG Tab TAKE 1 TABLET BY MOUTH EVERY DAY 90 Tab 0   • atorvastatin (LIPITOR) 80 MG tablet Take 1 Tab by mouth every day. 90 Tab 2   • aspirin (ASA) 325 MG TABS Take 325 mg by mouth every day.     • azithromycin (ZITHROMAX) 250 MG Tab Z-ileana, Use as directed. 6 Tab 0     No facility-administered encounter medications on file as of 5/4/2018.      Review of Systems   All other systems reviewed and are negative.       Objective:   /66   Pulse (!) 56   Ht 1.702 m (5' 7\")   Wt 66.7 kg (147 lb)   SpO2 92%   BMI 23.02 kg/m²     Physical Exam   Constitutional: She is oriented to person, place, and time. She appears well-developed and well-nourished. No distress.   Poor dentition   HENT:   Head: Normocephalic and atraumatic.   Right Ear: External ear normal.   Left Ear: External ear normal.   Mouth/Throat: No oropharyngeal exudate.   Eyes: Conjunctivae and EOM are normal. Pupils are equal, round, and reactive to light. Right eye exhibits no discharge. Left eye exhibits no discharge. No scleral icterus.   Neck: Normal range of motion. Neck supple. No JVD present. No tracheal deviation present. No thyromegaly " present.   Cardiovascular: Normal rate, regular rhythm, S1 normal, S2 normal, normal heart sounds and intact distal pulses.  PMI is not displaced.  Exam reveals no gallop, no S3, no S4 and no friction rub.    No murmur heard.  Pulses:       Carotid pulses are 2+ on the right side, and 2+ on the left side.       Radial pulses are 2+ on the right side, and 2+ on the left side.        Popliteal pulses are 2+ on the right side, and 2+ on the left side.        Dorsalis pedis pulses are 2+ on the right side, and 2+ on the left side.        Posterior tibial pulses are 2+ on the right side, and 2+ on the left side.   Pulmonary/Chest: Effort normal and breath sounds normal. No respiratory distress. She has no wheezes. She has no rales. She exhibits no tenderness.   Abdominal: Soft. Bowel sounds are normal. She exhibits no distension. There is no tenderness.   Musculoskeletal: Normal range of motion. She exhibits no edema or tenderness.   Neurological: She is alert and oriented to person, place, and time. No cranial nerve deficit (Cranial nerves II through XII grossly intact).   Skin: Skin is warm and dry. No rash noted. She is not diaphoretic. No erythema.   Psychiatric: She has a normal mood and affect. Her behavior is normal. Judgment and thought content normal.   Vitals reviewed.    LABS:  Lab Results   Component Value Date/Time    CHOLSTRLTOT 108 04/07/2017 08:55 AM    LDL 63 04/07/2017 08:55 AM    HDL 31 (A) 04/07/2017 08:55 AM    TRIGLYCERIDE 71 04/07/2017 08:55 AM       Lab Results   Component Value Date/Time    WBC 3.9 (L) 04/07/2017 08:55 AM    RBC 4.02 (L) 04/07/2017 08:55 AM    HEMOGLOBIN 12.9 04/07/2017 08:55 AM    HEMATOCRIT 39.1 04/07/2017 08:55 AM    MCV 97.3 04/07/2017 08:55 AM    NEUTSPOLYS 59.40 04/07/2017 08:55 AM    LYMPHOCYTES 25.10 04/07/2017 08:55 AM    MONOCYTES 9.80 04/07/2017 08:55 AM    EOSINOPHILS 4.90 04/07/2017 08:55 AM    BASOPHILS 0.50 04/07/2017 08:55 AM     Lab Results   Component Value  Date/Time    SODIUM 138 04/07/2017 08:55 AM    POTASSIUM 4.0 04/07/2017 08:55 AM    CHLORIDE 108 04/07/2017 08:55 AM    CO2 26 04/07/2017 08:55 AM    GLUCOSE 96 04/07/2017 08:55 AM    BUN 20 01/29/2018 11:18 AM    CREATININE 0.92 01/29/2018 11:18 AM         Lab Results   Component Value Date/Time    ALKPHOSPHAT 97 07/21/2017 09:43 AM    ASTSGOT 62 (H) 07/21/2017 09:43 AM    ALTSGPT 44 07/21/2017 09:43 AM    TBILIRUBIN 0.4 07/21/2017 09:43 AM      Lab Results   Component Value Date/Time    BNPBTYPENAT 10 06/17/2016 02:26 PM      No results found for: TSH  Lab Results   Component Value Date/Time    PROTHROMBTM 12.6 06/17/2016 02:26 PM    INR 0.94 06/17/2016 02:26 PM          Assessment:     1. Abdominal aortic aneurysm (AAA) without rupture (HCC)  COMP METABOLIC PANEL    LIPID PROFILE    BTYPE NATRIURETIC PEPTIDE    HEMOGLOBIN A1C    ECHOCARDIOGRAM COMP W/O CONT   2. Tobacco abuse     3. ADRIANNA on CPAP     4. Coronary artery disease involving native coronary artery of native heart without angina pectoris  COMP METABOLIC PANEL    LIPID PROFILE    BTYPE NATRIURETIC PEPTIDE    HEMOGLOBIN A1C   5. Essential hypertension     6. Mixed hyperlipidemia     7. Transient cerebral ischemia, unspecified type     8. Seasonal allergic rhinitis due to other allergic trigger     9. Other specified hypothyroidism         Medical Decision Making:  Today's Assessment / Status / Plan:     She is doing well from a cardiac perspective without any cardiovascular symptoms.  Moderately active.  Continues to smoke.  Fatigue is related to likely seasonal allergies as well as reduced thyroid dosing recently.  She will address these things with her new PCP in an upcoming appointment.  She also notes some dyspnea on exertion intermittently which she feels may have been related to allergies.    1.  Echocardiogram to evaluate her ascending aorta in the context of her abdominal aortic aneurysm  2.  Follow-up with Dr. Beaver regarding her AAA  3.  Continue  current medical therapy  4.  Add Claritin as needed  5.  Smoking cessation  6.  Lipids.  Goal LDL less than 70.    FU1Y

## 2018-05-07 ENCOUNTER — HOSPITAL ENCOUNTER (OUTPATIENT)
Dept: LAB | Facility: MEDICAL CENTER | Age: 73
End: 2018-05-07
Attending: INTERNAL MEDICINE
Payer: MEDICARE

## 2018-05-07 DIAGNOSIS — I71.40 ABDOMINAL AORTIC ANEURYSM (AAA) WITHOUT RUPTURE (HCC): ICD-10-CM

## 2018-05-07 DIAGNOSIS — I25.10 CORONARY ARTERY DISEASE INVOLVING NATIVE CORONARY ARTERY OF NATIVE HEART WITHOUT ANGINA PECTORIS: ICD-10-CM

## 2018-05-07 LAB
ALBUMIN SERPL BCP-MCNC: 3.5 G/DL (ref 3.2–4.9)
ALBUMIN/GLOB SERPL: 1.1 G/DL
ALP SERPL-CCNC: 88 U/L (ref 30–99)
ALT SERPL-CCNC: 31 U/L (ref 2–50)
ANION GAP SERPL CALC-SCNC: 5 MMOL/L (ref 0–11.9)
AST SERPL-CCNC: 52 U/L (ref 12–45)
BILIRUB SERPL-MCNC: 0.5 MG/DL (ref 0.1–1.5)
BNP SERPL-MCNC: 37 PG/ML (ref 0–100)
BUN SERPL-MCNC: 14 MG/DL (ref 8–22)
CALCIUM SERPL-MCNC: 8.7 MG/DL (ref 8.5–10.5)
CHLORIDE SERPL-SCNC: 109 MMOL/L (ref 96–112)
CHOLEST SERPL-MCNC: 113 MG/DL (ref 100–199)
CO2 SERPL-SCNC: 25 MMOL/L (ref 20–33)
CREAT SERPL-MCNC: 0.89 MG/DL (ref 0.5–1.4)
GLOBULIN SER CALC-MCNC: 3.1 G/DL (ref 1.9–3.5)
GLUCOSE SERPL-MCNC: 100 MG/DL (ref 65–99)
HDLC SERPL-MCNC: 38 MG/DL
LDLC SERPL CALC-MCNC: 61 MG/DL
POTASSIUM SERPL-SCNC: 3.8 MMOL/L (ref 3.6–5.5)
PROT SERPL-MCNC: 6.6 G/DL (ref 6–8.2)
SODIUM SERPL-SCNC: 139 MMOL/L (ref 135–145)
TRIGL SERPL-MCNC: 69 MG/DL (ref 0–149)

## 2018-05-07 PROCEDURE — 83880 ASSAY OF NATRIURETIC PEPTIDE: CPT | Mod: GA

## 2018-05-07 PROCEDURE — 36415 COLL VENOUS BLD VENIPUNCTURE: CPT

## 2018-05-07 PROCEDURE — 80061 LIPID PANEL: CPT

## 2018-05-07 PROCEDURE — 83036 HEMOGLOBIN GLYCOSYLATED A1C: CPT | Mod: GA

## 2018-05-07 PROCEDURE — 80053 COMPREHEN METABOLIC PANEL: CPT

## 2018-05-09 LAB
EST. AVERAGE GLUCOSE BLD GHB EST-MCNC: 114 MG/DL
HBA1C MFR BLD: 5.6 % (ref 0–5.6)

## 2018-05-10 ENCOUNTER — TELEPHONE (OUTPATIENT)
Dept: CARDIOLOGY | Facility: MEDICAL CENTER | Age: 73
End: 2018-05-10

## 2018-05-10 NOTE — TELEPHONE ENCOUNTER
Pt educated on A1c, BNP, lipid, and CMP results. She notes that she is currently in between PCP's and plans on seeing a new provider on 5/22. Pt is concerned about her thyroid function, educated her to please make sure to FU with new PCP on questions. Pt appreciative of assistance and denies further questions at this time.     ----- Message from Ingrid Harley sent at 5/10/2018  3:08 PM PDT -----  Regarding: patient calling for lab results  ANTHONY/Ngozi    Patient is calling for recent lab results. She can be reached at [phone].

## 2018-05-22 ENCOUNTER — OFFICE VISIT (OUTPATIENT)
Dept: MEDICAL GROUP | Facility: PHYSICIAN GROUP | Age: 73
End: 2018-05-22
Payer: MEDICARE

## 2018-05-22 ENCOUNTER — HOSPITAL ENCOUNTER (OUTPATIENT)
Dept: LAB | Facility: MEDICAL CENTER | Age: 73
End: 2018-05-22
Attending: INTERNAL MEDICINE
Payer: MEDICARE

## 2018-05-22 VITALS
DIASTOLIC BLOOD PRESSURE: 74 MMHG | RESPIRATION RATE: 16 BRPM | BODY MASS INDEX: 22.76 KG/M2 | OXYGEN SATURATION: 98 % | HEART RATE: 53 BPM | SYSTOLIC BLOOD PRESSURE: 124 MMHG | WEIGHT: 145 LBS | HEIGHT: 67 IN | TEMPERATURE: 99.1 F

## 2018-05-22 DIAGNOSIS — E03.4 HYPOTHYROIDISM DUE TO ACQUIRED ATROPHY OF THYROID: ICD-10-CM

## 2018-05-22 DIAGNOSIS — Z91.09 ENVIRONMENTAL ALLERGIES: ICD-10-CM

## 2018-05-22 DIAGNOSIS — I25.10 CORONARY ARTERY DISEASE INVOLVING NATIVE CORONARY ARTERY OF NATIVE HEART WITHOUT ANGINA PECTORIS: ICD-10-CM

## 2018-05-22 DIAGNOSIS — F51.04 CHRONIC INSOMNIA: ICD-10-CM

## 2018-05-22 DIAGNOSIS — F32.0 CURRENT MILD EPISODE OF MAJOR DEPRESSIVE DISORDER WITHOUT PRIOR EPISODE (HCC): ICD-10-CM

## 2018-05-22 DIAGNOSIS — I71.40 ABDOMINAL AORTIC ANEURYSM (AAA) WITHOUT RUPTURE (HCC): ICD-10-CM

## 2018-05-22 PROBLEM — G47.30 SLEEP APNEA: Status: ACTIVE | Noted: 2018-05-22

## 2018-05-22 PROBLEM — M54.50 LOW BACK PAIN: Status: ACTIVE | Noted: 2018-05-22

## 2018-05-22 LAB — TSH SERPL DL<=0.005 MIU/L-ACNC: 2.27 UIU/ML (ref 0.38–5.33)

## 2018-05-22 PROCEDURE — 84443 ASSAY THYROID STIM HORMONE: CPT

## 2018-05-22 PROCEDURE — 36415 COLL VENOUS BLD VENIPUNCTURE: CPT

## 2018-05-22 PROCEDURE — 99214 OFFICE O/P EST MOD 30 MIN: CPT | Performed by: INTERNAL MEDICINE

## 2018-05-22 NOTE — ASSESSMENT & PLAN NOTE
She says her levothyroxine was changed to 88 mcg from 100 mcg and she says since then she has been tired and her nails have been breaking.

## 2018-05-22 NOTE — PATIENT INSTRUCTIONS
Sleep Hygiene Tips - Research & Treatments  From [employer]    Getting good sleep is important in maintaining health. There are several things that you can do to promote good sleep and sleep hygiene, and ultimately get better sleep.  What is sleep hygiene?  Sleep hygiene is defined as behaviors that one can do to help promote good sleep using behavioral interventions.    TIPS:     Maintain a regular sleep routine  · Go to bed at the same time. Wake up at the same time. Ideally, your schedule will remain the same (+/- 20 minutes) every night of the week.  Avoid naps if possible  Naps decrease the ‘Sleep Debt’ that is so necessary for easy sleep onset.  Each of us needs a certain amount of sleep per 24-hour period. We need that amount, and we don’t need more than that.When we take naps, it decreases the amount of sleep that we need the next night - which may cause sleep fragmentation and difficulty initiating sleep, and may lead to insomnia.  Don’t stay in bed awake for more than 5-10 minutes.  If you find your mind racing, or worrying about not being able to sleep during the middle of the night, get out of bed, and sit in a chair in the dark. Do your mind racing in the chair until you are sleepy, then return to bed. No TV or internet during these periods! That will just stimulate you more than desired.            If this happens several times during the night, that is OK. Just maintain your           regular wake time, and try to avoid naps.  Don’t watch TV or read in bed.  When you watch TV or read in bed, you associate the bed with wakefulness.  Drink caffeinated drinks with caution   · The effects of caffeine may last for several hours after ingestion. Caffeine can fragment sleep, and cause difficulty initiating sleep. If you drink caffeine, use it only before noon.   Remember that soda and tea contain caffeine as well.  Avoid inappropriate substances that interfere with sleep       Cigarettes,  alcohol, and over-the-counter medications may cause fragmented sleep.  Exercise regularly  Exercise before 2 pm every day. Exercise promotes continuous sleep.  · Avoid rigorous exercise before bedtime. Rigorous exercise circulates endorphins into the body which may cause difficulty initiating sleep.   Have a quiet, comfortable bedroom      Set your bedroom thermostat at a comfortable temperature. Generally, a little cooler        is better than a little warmer. Turn off the TV and other extraneous noise that may disrupt sleep. Background ‘white noise’ like a fan is Ok. If your pets awaken you, keep them outside the bedroom. Your bedroom should be dark. Turn off bright lights.  Have a comfortable mattress.  If you are a ‘clock watcher’ at night, hide the clock.  Have a comfortable pre-bedtime routine  A warm bath, shower  Meditation, or quiet time  Some who are struggling with sleep regularly find it helpful to print out these recommendations and read them regularly. If you accidentally miss some of recommendations, or have a bad night, do not fret. By following these sleep hygiene recommendations, you will help yourself to get into a routine that promotes good sleep opportunities.

## 2018-05-22 NOTE — PROGRESS NOTES
PRIMARY CARE CLINIC NEW PATIENT H&P  Chief Complaint   Patient presents with   • Hypertension     Med Management    • Hyperlipidemia     Med Management    • Depression     Med Management    • Hypothyroidism     Med Management    • Insomnia     Med Management        History of Present Illness     Hypothyroid  She says her levothyroxine was changed to 88 mcg from 100 mcg and she says since then she has been tired and her nails have been breaking.     CAD (coronary artery disease)  Following with cardiologist Dr. Pulido.     AAA (abdominal aortic aneurysm)  She has a repeat ultrasound scheduled for June 13 with Dr. Beaver, vascular surgeon.     Chronic insomnia  She's been taking trazodone every night. She splits her trazodone 150 mg tablets.     Hyperlipidemia  Controlled on atorvastatin 80 mg daily.     Lab Results   Component Value Date/Time    CHOLSTRLTOT 113 05/07/2018 09:25 AM    LDL 61 05/07/2018 09:25 AM    HDL 38 (A) 05/07/2018 09:25 AM    TRIGLYCERIDE 69 05/07/2018 09:25 AM         Sleep apnea  She was prescribed CPAP but she doesn't take it.     Environmental allergies  She says she sometimes gets short of breath from allergies and an inhaler helps her catch her breath.     Depression  Controlled on lexapro for quite a while.     Low back pain  She would like to know what she can do for low back pain since she can't take NSAIDs or tylenol.       Current Outpatient Prescriptions   Medication Sig Dispense Refill   • Albuterol Sulfate 108 (90 Base) MCG/ACT AEROSOL POWDER, BREATH ACTIVATED Inhale 1 Puff by mouth 1 time daily as needed. 1 Each 2   • carvedilol (COREG) 6.25 MG Tab Take 1 Tab by mouth 2 times a day, with meals. Needs to be seen for further refills. Thank you 60 Tab 1   • losartan (COZAAR) 100 MG Tab TAKE ONE TABLET BY MOUTH DAILY 90 Tab 3   • escitalopram (LEXAPRO) 20 MG tablet TAKE 1 TABLET BY MOUTH EVERY DAY 90 Tab 2   • trazodone (DESYREL) 150 MG Tab TAKE ONE TABLET BY MOUTH TWICE A DAY 60  Tab 10   • levothyroxine (SYNTHROID) 88 MCG Tab TAKE ONE TABLET BY MOUTH DAILY 90 Tab 2   • spironolactone (ALDACTONE) 50 MG Tab TAKE 1 TABLET BY MOUTH EVERY DAY 90 Tab 0   • atorvastatin (LIPITOR) 80 MG tablet Take 1 Tab by mouth every day. 90 Tab 2   • aspirin (ASA) 325 MG TABS Take 325 mg by mouth every day.       No current facility-administered medications for this visit.        Past Medical History:   Diagnosis Date   • AAA (abdominal aortic aneurysm) (HCC)    • CAD (coronary artery disease) 10/4/2012    2 stents [place]     • Chronic insomnia 2016   • Depression 2013   • Diverticula of colon    • Hyperlipidemia 10/4/2012   • Hypertension    • Hypothyroid 10/17/2013   • Sleep apnea 2018   • Thyroid disease    • TIA (transient ischemic attack) 10/4/2012    2003      Past Surgical History:   Procedure Laterality Date   • STENT PLACEMENT      Dr Can in [place]   • OTHER Left     foot   • OTHER      throat polyps removed (non cancerous)      Social History   Substance Use Topics   • Smoking status: Current Every Day Smoker     Packs/day: 0.25     Years: 40.00     Types: Cigarettes   • Smokeless tobacco: Never Used      Comment: 3-4cigarettes daily, E CIG   • Alcohol use No     Social History     Social History Narrative    On disability      Family History   Problem Relation Age of Onset   • Heart Disease Mother    • Heart Attack Mother    • Heart Disease Father    • Heart Attack Father    • Cancer Maternal Grandmother      stomach   • Diabetes Maternal Grandmother    • Cancer Maternal Grandfather      brain   • Stroke Paternal Grandmother      Family Status   Relation Status   • Mother  at age 87   • Father  at age 72   • Maternal Grandmother    • Maternal Grandfather    • Paternal Grandmother      Allergies: Pcn [penicillins]    ROS  Constitutional: Negative for fatigue/generalized weakness.   HEENT: Negative for  vision changes, hearing changes    Respiratory:  "Negative for shortness of breath  Cardiovascular: Negative for chest pain, palpitations  Gastrointestinal: Negative for blood in stool and diarrhea. Positive for constipation that resolves with fruit intake   Genitourinary: Negative for dysuria, polyuria  Musculoskeletal: Positive for low back pain as per HPI  Skin: Negative for rash  Neurological: Negative for numbness, tingling  Psychiatric/Behavioral: Positive for controlled depression as per HPI      Objective   Blood pressure 124/74, pulse (!) 53, temperature 37.3 °C (99.1 °F), resp. rate 16, height 1.702 m (5' 7\"), weight 65.8 kg (145 lb), SpO2 98 %. Body mass index is 22.71 kg/m².    General: Alert, oriented. In no acute distress   HEET: EOMI, PERRL, conjunctiva non-injected, sclera non-icteric.  Nares patent with no significant congestion or drainage.  Annette pinnae, external auditory canals, TM pearly gray with normal light reflex bilaterally.Oral mucous membranes pink and moist with no lesions.  Neck: supple with no cervical, subclavicular lymphadenopathy, JVD, palpable thyroid nodules   Lungs: clear to auscultation bilaterally with good excursion.  CV: regular rate and rhythm.  Abdomen soft, non-distended, non-tender with normal bowel sounds. No hepatosplenomegaly, no masses palpated  Skin: no lesions. Warm, dry   Psychiatric: appropriate mood and affect       Assessment and Plan   The following treatment plan was discussed     1. Coronary artery disease involving native coronary artery of native heart without angina pectoris  Following with Dr. Pulido in cardiology. Continue aspirin, statin, beta blocker and ARB.     2. Abdominal aortic aneurysm (AAA) without rupture (HCC)  Following with Dr. Beaver, vascular surgeon. She has a repeat ultrasound scheduled (ordered by Dr. Beaver) June 13. Counseled on smoking cessation.     3. Chronic insomnia  Given handout on sleep hygiene; doesn't use trazodone every night.     4. Environmental allergies  - Albuterol " Sulfate 108 (90 Base) MCG/ACT AEROSOL POWDER, BREATH ACTIVATED; Inhale 1 Puff by mouth 1 time daily as needed.  Dispense: 1 Each; Refill: 2    5. Hypothyroidism due to acquired atrophy of thyroid  She is feeling fatigued and her nails are breaking since her levothyroxine was decreased from 100 mcg to 88 mcg. Will check TSH before making further adjustments.   - TSH WITH REFLEX TO FT4; Future    6. Current mild episode of major depressive disorder without prior episode (HCC)  Well controlled on lexapro.       Return in about 6 months (around 11/22/2018) for smoking cessation .    Health Maintenance    There are no preventive care reminders to display for this patient.    Bc Leslie MD  Internal Medicine  [facility]

## 2018-05-22 NOTE — ASSESSMENT & PLAN NOTE
Controlled on atorvastatin 80 mg daily.     Lab Results   Component Value Date/Time    CHOLSTRLTOT 113 05/07/2018 09:25 AM    LDL 61 05/07/2018 09:25 AM    HDL 38 (A) 05/07/2018 09:25 AM    TRIGLYCERIDE 69 05/07/2018 09:25 AM

## 2018-05-22 NOTE — ASSESSMENT & PLAN NOTE
She says she sometimes gets short of breath from allergies and an inhaler helps her catch her breath.

## 2018-05-23 ENCOUNTER — HOSPITAL ENCOUNTER (OUTPATIENT)
Dept: CARDIOLOGY | Facility: MEDICAL CENTER | Age: 73
End: 2018-05-23
Attending: INTERNAL MEDICINE
Payer: MEDICARE

## 2018-05-23 ENCOUNTER — TELEPHONE (OUTPATIENT)
Dept: MEDICAL GROUP | Facility: PHYSICIAN GROUP | Age: 73
End: 2018-05-23

## 2018-05-23 DIAGNOSIS — E03.4 HYPOTHYROIDISM DUE TO ACQUIRED ATROPHY OF THYROID: ICD-10-CM

## 2018-05-23 DIAGNOSIS — I71.40 ABDOMINAL AORTIC ANEURYSM (AAA) WITHOUT RUPTURE (HCC): ICD-10-CM

## 2018-05-23 PROCEDURE — 93306 TTE W/DOPPLER COMPLETE: CPT

## 2018-05-23 PROCEDURE — 93306 TTE W/DOPPLER COMPLETE: CPT | Mod: 26 | Performed by: INTERNAL MEDICINE

## 2018-05-23 RX ORDER — LEVOTHYROXINE SODIUM 0.1 MG/1
100 TABLET ORAL
Qty: 50 TAB | Refills: 0 | Status: SHIPPED | OUTPATIENT
Start: 2018-05-23 | End: 2018-07-26 | Stop reason: SDUPTHER

## 2018-05-23 NOTE — TELEPHONE ENCOUNTER
VOICEMAIL  1. Caller Name: Evie Morillo                        Call Back Number: [phone]    2. Message: pt received her lab results and states she would like to increase her levothyroxine to 100 mcg.    3. Patient approves office to leave a detailed voicemail/MyChart message: N\A

## 2018-05-23 NOTE — TELEPHONE ENCOUNTER
Ok, limited prescription sent as she needs to have her labs (non-fasting) checked after 6 weeks of being on the levothyroxine 100 mcg to make sure this dose isn't too high

## 2018-05-24 ENCOUNTER — TELEPHONE (OUTPATIENT)
Dept: MEDICAL GROUP | Facility: PHYSICIAN GROUP | Age: 73
End: 2018-05-24

## 2018-05-24 LAB
LV EJECT FRACT  99904: 60
LV EJECT FRACT MOD 2C 99903: 62.18
LV EJECT FRACT MOD 4C 99902: 65.09
LV EJECT FRACT MOD BP 99901: 62.32

## 2018-05-24 RX ORDER — SPIRONOLACTONE 50 MG/1
50 TABLET, FILM COATED ORAL
Qty: 90 TAB | Refills: 1 | Status: SHIPPED | OUTPATIENT
Start: 2018-05-24 | End: 2018-06-27 | Stop reason: SDUPTHER

## 2018-05-24 NOTE — TELEPHONE ENCOUNTER
See MA's notes below, pt has met protocol, OV earlier this week   BP Readings from Last 1 Encounters:   05/22/18 124/74

## 2018-05-24 NOTE — TELEPHONE ENCOUNTER
Was the patient seen in the last year in this department? Yes 05/22/2018    Does patient have an active prescription for medications requested? No     Received Request Via: Patient

## 2018-05-25 ENCOUNTER — TELEPHONE (OUTPATIENT)
Dept: CARDIOLOGY | Facility: MEDICAL CENTER | Age: 73
End: 2018-05-25

## 2018-05-26 NOTE — TELEPHONE ENCOUNTER
Pt educated on Albuterol and Carvedilol, informed that it is okay to take both and to monitor for any increase in palpitations or HR while using albuterol. Pt states understanding. She was also educated on Echo results per Dr. Pulido. She is currently being monitored for Abdominal aortic aneurysm (AAA) without rupture. Pt appreciative of information and denies further needs at this time.     -----------------------------  Echo results:  CONCLUSIONS  No prior study is available for comparison.   The left ventricle was normal in size and thickness.  The left ventricle was normal in size and thickness.  Left ventricular ejection fraction is visually estimated to be 60%.  Mild aortic insufficiency.  Ascending aorta is dilated with a diameter of 4.4 cm.  Andre Pulido MD  (Electronically Signed)    ----- Message from Ingrid Harley sent at 5/25/2018  3:19 PM PDT -----  Regarding: question about her medication  TW/Ngozi    Patient says she has a medication called Ventolin A and wants to make sure it is okay with her to take it with her beta blocker. She can be reached at [phone].

## 2018-06-20 DIAGNOSIS — E78.5 HYPERLIPIDEMIA, UNSPECIFIED HYPERLIPIDEMIA TYPE: ICD-10-CM

## 2018-06-20 DIAGNOSIS — I10 ESSENTIAL HYPERTENSION: ICD-10-CM

## 2018-06-22 RX ORDER — CARVEDILOL 6.25 MG/1
TABLET ORAL
Qty: 180 TAB | Refills: 3 | Status: SHIPPED | OUTPATIENT
Start: 2018-06-22 | End: 2019-09-13 | Stop reason: SDUPTHER

## 2018-06-22 RX ORDER — ATORVASTATIN CALCIUM 80 MG/1
TABLET, FILM COATED ORAL
Qty: 90 TAB | Refills: 3 | Status: SHIPPED | OUTPATIENT
Start: 2018-06-22 | End: 2019-06-28 | Stop reason: SDUPTHER

## 2018-06-28 RX ORDER — SPIRONOLACTONE 50 MG/1
TABLET, FILM COATED ORAL
Qty: 90 TAB | Refills: 3 | Status: SHIPPED | OUTPATIENT
Start: 2018-06-28 | End: 2019-06-22 | Stop reason: SDUPTHER

## 2018-07-26 DIAGNOSIS — E03.4 HYPOTHYROIDISM DUE TO ACQUIRED ATROPHY OF THYROID: ICD-10-CM

## 2018-07-26 NOTE — TELEPHONE ENCOUNTER
Was the patient seen in the last year in this department? Yes    Does patient have an active prescription for medications requested? No     Received Request Via: Pharmacy      Pt met protocol?: Yes    LAST OV 05/22/2018      Lab Results  Component Value Date/Time   TSHULTRASEN 2.270 05/22/2018 1137

## 2018-07-27 RX ORDER — LEVOTHYROXINE SODIUM 0.1 MG/1
TABLET ORAL
Qty: 30 TAB | Refills: 0 | Status: SHIPPED | OUTPATIENT
Start: 2018-07-27 | End: 2018-08-16 | Stop reason: SDUPTHER

## 2018-07-27 NOTE — TELEPHONE ENCOUNTER
Patient was last seen by PCP 5/18. Last TSH read 5/18 (2.27). Will refill for 1 month. Due for recheck. Has upcoming appt.

## 2018-08-08 ENCOUNTER — HOSPITAL ENCOUNTER (OUTPATIENT)
Dept: LAB | Facility: MEDICAL CENTER | Age: 73
End: 2018-08-08
Attending: INTERNAL MEDICINE
Payer: MEDICARE

## 2018-08-08 DIAGNOSIS — E03.4 HYPOTHYROIDISM DUE TO ACQUIRED ATROPHY OF THYROID: ICD-10-CM

## 2018-08-08 LAB — TSH SERPL DL<=0.005 MIU/L-ACNC: 1.25 UIU/ML (ref 0.38–5.33)

## 2018-08-08 PROCEDURE — 84443 ASSAY THYROID STIM HORMONE: CPT

## 2018-08-08 PROCEDURE — 36415 COLL VENOUS BLD VENIPUNCTURE: CPT

## 2018-08-13 ENCOUNTER — OFFICE VISIT (OUTPATIENT)
Dept: MEDICAL GROUP | Facility: PHYSICIAN GROUP | Age: 73
End: 2018-08-13
Payer: MEDICARE

## 2018-08-13 VITALS
HEART RATE: 42 BPM | SYSTOLIC BLOOD PRESSURE: 98 MMHG | DIASTOLIC BLOOD PRESSURE: 60 MMHG | OXYGEN SATURATION: 97 % | RESPIRATION RATE: 14 BRPM | WEIGHT: 142.2 LBS | BODY MASS INDEX: 22.32 KG/M2 | HEIGHT: 67 IN | TEMPERATURE: 97.3 F

## 2018-08-13 DIAGNOSIS — R09.81 NASAL CONGESTION: ICD-10-CM

## 2018-08-13 DIAGNOSIS — Z72.0 TOBACCO ABUSE: ICD-10-CM

## 2018-08-13 DIAGNOSIS — I71.40 ABDOMINAL AORTIC ANEURYSM (AAA) WITHOUT RUPTURE (HCC): ICD-10-CM

## 2018-08-13 PROBLEM — R55 SYNCOPE: Status: ACTIVE | Noted: 2018-08-13

## 2018-08-13 PROCEDURE — 99213 OFFICE O/P EST LOW 20 MIN: CPT | Performed by: INTERNAL MEDICINE

## 2018-08-13 RX ORDER — DOXYCYCLINE HYCLATE 100 MG
100 TABLET ORAL 2 TIMES DAILY
Qty: 14 TAB | Refills: 0 | Status: SHIPPED | OUTPATIENT
Start: 2018-08-13 | End: 2018-08-20

## 2018-08-13 RX ORDER — ALBUTEROL SULFATE 90 UG/1
AEROSOL, METERED RESPIRATORY (INHALATION)
COMMUNITY
Start: 2018-05-23 | End: 2019-09-17

## 2018-08-13 ASSESSMENT — PATIENT HEALTH QUESTIONNAIRE - PHQ9: CLINICAL INTERPRETATION OF PHQ2 SCORE: 0

## 2018-08-13 NOTE — ASSESSMENT & PLAN NOTE
Just recently saw Dr. Beaver. Her AAA was stable on CT as of 1/2018. Says she follows up with Dr. Beaver around [place].

## 2018-08-13 NOTE — ASSESSMENT & PLAN NOTE
About 2-3 weeks ago she was closing the window and she passed out. As she was pushing the window closed her legs started to shake, she turned around and faced the bed to lay her head down on the bed. Then she heard the crash of the portable air conditioner which fell onto her. When she pulled herself up and put the air conditioner upright she felt her legs starting to give out again. She had taken her sleeping pill before all of this happened. Denied chest pain, palpitations before this event.

## 2018-08-13 NOTE — PROGRESS NOTES
PRIMARY CARE CLINIC FOLLOW UP VISIT  Chief Complaint   Patient presents with   • Nicotine Dependence   • Nasal Congestion     History of Present Illness     Syncope  About 2-3 weeks ago she was closing the window and she passed out. As she was pushing the window closed her legs started to shake, she turned around and faced the bed to lay her head down on the bed. Then she heard the crash of the portable air conditioner which fell onto her. When she pulled herself up and put the air conditioner upright she felt her legs starting to give out again. She had taken her sleeping pill before all of this happened. Denied chest pain, palpitations before this event.     Tobacco abuse  Says she hasn't been smoking since it's been so hot. She puts the cigarette out as soon as she lights it. Had a puff of a cigarette this morning and didn't smoke the rest of it.     AAA (abdominal aortic aneurysm)  Just recently saw Dr. Beaver. Her AAA was stable on CT as of 1/2018. Says she follows up with Dr. Beaver around [place].     Nasal congestion  Starting having nasal congestion, stuffiness, and mucous the last 3 weeks. Denies fevers but has been having chills and has been wearing her sweater and bathrobe.     Current Outpatient Prescriptions   Medication Sig Dispense Refill   • VENTOLIN  (90 Base) MCG/ACT Aero Soln inhalation aerosol      • doxycycline (VIBRAMYCIN) 100 MG Tab Take 1 Tab by mouth 2 times a day for 7 days. 14 Tab 0   • levothyroxine (SYNTHROID) 100 MCG Tab TAKE 1 TAB BY MOUTH EVERY MORNING ON AT NOON TIME EMPTY STOMACH. 30 Tab 0   • spironolactone (ALDACTONE) 50 MG Tab TAKE ONE TABLET BY MOUTH DAILY 90 Tab 3   • atorvastatin (LIPITOR) 80 MG tablet TAKE ONE TABLET BY MOUTH DAILY 90 Tab 3   • carvedilol (COREG) 6.25 MG Tab TAKE 1 TABLET BY MOUTH TWICE A  Tab 3   • losartan (COZAAR) 100 MG Tab TAKE ONE TABLET BY MOUTH DAILY 90 Tab 3   • escitalopram (LEXAPRO) 20 MG tablet TAKE 1 TABLET BY MOUTH EVERY DAY 90  "Tab 2   • trazodone (DESYREL) 150 MG Tab TAKE ONE TABLET BY MOUTH TWICE A DAY 60 Tab 10   • aspirin (ASA) 325 MG TABS Take 325 mg by mouth every day.       No current facility-administered medications for this visit.      Past Medical History:   Diagnosis Date   • AAA (abdominal aortic aneurysm) (HCC)    • CAD (coronary artery disease) 10/4/2012    2 stents [place]     • Chronic insomnia 2016   • Depression 2013   • Diverticula of colon    • Hyperlipidemia 10/4/2012   • Hypertension    • Hypothyroid 10/17/2013   • Sleep apnea 2018   • Thyroid disease    • TIA (transient ischemic attack) 10/4/2012    2003      Past Surgical History:   Procedure Laterality Date   • STENT PLACEMENT      Dr Can in [place]   • OTHER Left     foot   • OTHER      throat polyps removed (non cancerous)      Social History   Substance Use Topics   • Smoking status: Current Every Day Smoker     Packs/day: 0.25     Years: 40.00     Types: Cigarettes   • Smokeless tobacco: Never Used      Comment: 3-4cigarettes daily, E CIG   • Alcohol use No     Social History     Social History Narrative    On disability      Family History   Problem Relation Age of Onset   • Heart Disease Mother    • Heart Attack Mother    • Heart Disease Father    • Heart Attack Father    • Cancer Maternal Grandmother         stomach   • Diabetes Maternal Grandmother    • Cancer Maternal Grandfather         brain   • Stroke Paternal Grandmother      Family Status   Relation Status   • Mo  at age 87   • Fa  at age 72   • MGMo (Not Specified)   • MGFa (Not Specified)   • PGMo (Not Specified)     Allergies: Pcn [penicillins]    ROS  As per HPI above. All other systems reviewed and negative.        Objective   Blood pressure (!) 98/60, pulse (!) 42, temperature 36.3 °C (97.3 °F), resp. rate 14, height 1.702 m (5' 7\"), weight 64.5 kg (142 lb 3.2 oz), SpO2 97 %. Body mass index is 22.27 kg/m².    General: alert and oriented, pleasant, " cooperative  HEENT: Normocephalic, atraumatic. No thyroid masses. Oropharynx clear with mild injection.   Cardiovascular: regular rate and rhythm, normal S1/S2  Pulmonary: lungs clear to auscultation bilaterally  Skin: warm and dry, no lesions or rashes  Psychiatric: appropriate mood and affect. Good insight and appropriate judgment     Assessment and Plan   The following treatment plan was discussed     1. Tobacco abuse  Presently her smoking is very limited, will continue to follow up.     2. Abdominal aortic aneurysm (AAA) without rupture (HCC)  Last CT 1/2018 with stable aneurysm, following with Dr. Beaver and sees him next around [place] time.     3. Nasal congestion  Given relative hypotension and chills will prescribe a course of doxycycline. Says she didn't tolerate a z ileana well.   - doxycycline (VIBRAMYCIN) 100 MG Tab; Take 1 Tab by mouth 2 times a day for 7 days.  Dispense: 14 Tab; Refill: 0    4. Syncope  Does have history of postural hypotension, it seems her last event was vasovagal in nature and possibly mediated by a medication/heat. Presently she is hydrating well. Prodrome wasn't consistent with neurocardiogenic syncope, will continue to monitor. May advise lowering the dose of her trazodone at next visit.     Healthcare maintenance     Health Maintenance Due   Topic Date Due   • IMM ZOSTER VACCINES (1 of 2) 06/24/1993   • Annual Wellness Visit  07/01/2018     Discussed Shingrix vaccine, presently on back order     Return if symptoms worsen or fail to improve.    Bc Leslie MD  Internal Medicine  [facility]

## 2018-08-13 NOTE — ASSESSMENT & PLAN NOTE
Starting having nasal congestion, stuffiness, and mucous the last 3 weeks. Denies fevers but has been having chills and has been wearing her sweater and bathrobe.

## 2018-08-13 NOTE — ASSESSMENT & PLAN NOTE
Says she hasn't been smoking since it's been so hot. She puts the cigarette out as soon as she lights it. Had a puff of a cigarette this morning and didn't smoke the rest of it.

## 2018-08-16 DIAGNOSIS — E03.4 HYPOTHYROIDISM DUE TO ACQUIRED ATROPHY OF THYROID: ICD-10-CM

## 2018-08-18 RX ORDER — LEVOTHYROXINE SODIUM 0.1 MG/1
TABLET ORAL
Qty: 90 TAB | Refills: 1 | Status: SHIPPED | OUTPATIENT
Start: 2018-08-18 | End: 2019-02-19 | Stop reason: SDUPTHER

## 2018-08-20 DIAGNOSIS — E03.4 HYPOTHYROIDISM DUE TO ACQUIRED ATROPHY OF THYROID: ICD-10-CM

## 2018-08-21 RX ORDER — LEVOTHYROXINE SODIUM 0.1 MG/1
TABLET ORAL
OUTPATIENT
Start: 2018-08-21

## 2018-10-19 ENCOUNTER — PATIENT MESSAGE (OUTPATIENT)
Dept: MEDICAL GROUP | Facility: PHYSICIAN GROUP | Age: 73
End: 2018-10-19

## 2018-10-19 DIAGNOSIS — G47.30 SLEEP APNEA, UNSPECIFIED TYPE: ICD-10-CM

## 2018-10-22 ENCOUNTER — TELEPHONE (OUTPATIENT)
Dept: CARDIOLOGY | Facility: MEDICAL CENTER | Age: 73
End: 2018-10-22

## 2018-10-22 NOTE — TELEPHONE ENCOUNTER
Evie Pulido M.D. 28 minutes ago (1:14 PM)         I need an appointment. I am having difficulties, pain between my breasts, and at night I dream or whatever that I can`t move or catch my breath. In the morning I am drained of energy and in a fog.         =======================  Called and spoke with pt regarding her chest pain. She states it is there most of the time and is a dull pain. She also feels a little lightheaded at times. She states it is worse after she has one of her episodes at night where she wakes up and feels like she can not move and then for the rest of the day she feels drained and tired. She states she has not noticed any other changes other than an increase in her allergies. Instructed pt that with the pain she is describing she should go be seen at the ER or urgent care to make sure there is nothing serious going on. Pt states she will go to urgent care to be evaluated. Task sent to schedulers for follow up.

## 2018-11-29 ENCOUNTER — TELEPHONE (OUTPATIENT)
Dept: CARDIOLOGY | Facility: MEDICAL CENTER | Age: 73
End: 2018-11-29

## 2018-11-29 NOTE — TELEPHONE ENCOUNTER
"----- Message from Antoinette Benítez sent at 11/29/2018  1:12 PM PST -----  Regarding: \"Quit Smoking Lozenges\"  Contact: [phone]  TW/alejandra Sanford calling to ask TW about getting some \"Quit Smoking Lozenges\".  Please call Evie at .  "

## 2018-11-30 ENCOUNTER — TELEPHONE (OUTPATIENT)
Dept: CARDIOLOGY | Facility: MEDICAL CENTER | Age: 73
End: 2018-11-30

## 2018-11-30 NOTE — TELEPHONE ENCOUNTER
She calls back regarding whether or not it would be safe for her to use Polacrilex Lozenges.  Chantix did not work for her and she thinks that that medications caused her to have an aneurysm.  I sent a message to HARDY Lacey to advise.  I mailed Evie a very informative handout on the product and will let her know Charo's response.

## 2018-12-01 NOTE — TELEPHONE ENCOUNTER
I advised her that, per Charo, she does not know much about the product, but nicotine lozenges to replace smoking are better than smoking itself.

## 2018-12-04 ENCOUNTER — OFFICE VISIT (OUTPATIENT)
Dept: URGENT CARE | Facility: PHYSICIAN GROUP | Age: 73
End: 2018-12-04
Payer: MEDICARE

## 2018-12-04 VITALS
TEMPERATURE: 97.4 F | OXYGEN SATURATION: 93 % | SYSTOLIC BLOOD PRESSURE: 118 MMHG | RESPIRATION RATE: 20 BRPM | HEART RATE: 72 BPM | WEIGHT: 140 LBS | BODY MASS INDEX: 21.97 KG/M2 | DIASTOLIC BLOOD PRESSURE: 76 MMHG | HEIGHT: 67 IN

## 2018-12-04 DIAGNOSIS — R05.9 COUGH: ICD-10-CM

## 2018-12-04 DIAGNOSIS — J02.9 SORE THROAT: ICD-10-CM

## 2018-12-04 DIAGNOSIS — R09.81 NASAL CONGESTION WITH RHINORRHEA: ICD-10-CM

## 2018-12-04 DIAGNOSIS — J34.89 NASAL CONGESTION WITH RHINORRHEA: ICD-10-CM

## 2018-12-04 DIAGNOSIS — J06.9 URI WITH COUGH AND CONGESTION: ICD-10-CM

## 2018-12-04 LAB
INT CON NEG: NORMAL
INT CON POS: NORMAL
S PYO AG THROAT QL: NORMAL

## 2018-12-04 PROCEDURE — 87880 STREP A ASSAY W/OPTIC: CPT | Performed by: NURSE PRACTITIONER

## 2018-12-04 PROCEDURE — 99214 OFFICE O/P EST MOD 30 MIN: CPT | Performed by: NURSE PRACTITIONER

## 2018-12-04 RX ORDER — DOXYCYCLINE HYCLATE 100 MG
100 TABLET ORAL 2 TIMES DAILY
Qty: 14 TAB | Refills: 0 | Status: SHIPPED | OUTPATIENT
Start: 2018-12-04 | End: 2018-12-11

## 2018-12-04 ASSESSMENT — ENCOUNTER SYMPTOMS
NAUSEA: 0
VOMITING: 0
WEAKNESS: 0
ABDOMINAL PAIN: 0
EYE DISCHARGE: 0
ORTHOPNEA: 0
WHEEZING: 0
CHILLS: 0
SINUS PAIN: 1
MYALGIAS: 0
FEVER: 0
DIARRHEA: 0
DIZZINESS: 0
PALPITATIONS: 0
SORE THROAT: 1
SHORTNESS OF BREATH: 0
COUGH: 1
EYE REDNESS: 0
HEADACHES: 0
NECK PAIN: 0
CONSTIPATION: 0
SPUTUM PRODUCTION: 0

## 2018-12-04 NOTE — PROGRESS NOTES
"Subjective:      Evie Morillo is a 73 y.o. female who presents with Pharyngitis (1 week )            HPI  C/o sore throat, cough- phlegm- clear d/c, nasal congestion thick nasal d/c with blood tinged mucus, frontal sinus pressure. Ex-smoker x 2 days. Started Nicotine lozenges but \"burns\" throat. No OTC meds used. Fatigue.    PMH:  has a past medical history of AAA (abdominal aortic aneurysm) (HCC); CAD (coronary artery disease) (10/4/2012); Chronic insomnia (5/23/2016); Depression (9/26/2013); Diverticula of colon; Hyperlipidemia (10/4/2012); Hypertension; Hypothyroid (10/17/2013); Sleep apnea (5/22/2018); Thyroid disease; and TIA (transient ischemic attack) (10/4/2012).  MEDS:   Current Outpatient Prescriptions:   •  traZODone (DESYREL) 150 MG Tab, TAKE ONE TABLET BY MOUTH TWICE A DAY, Disp: 180 Tab, Rfl: 3  •  escitalopram (LEXAPRO) 20 MG tablet, TAKE 1 TABLET BY MOUTH EVERY DAY, Disp: 90 Tab, Rfl: 0  •  levothyroxine (SYNTHROID) 100 MCG Tab, TAKE 1 TAB BY MOUTH EVERY MORNING AT NOON TIME ON AN EMPTY STOMACH., Disp: 90 Tab, Rfl: 1  •  spironolactone (ALDACTONE) 50 MG Tab, TAKE ONE TABLET BY MOUTH DAILY, Disp: 90 Tab, Rfl: 3  •  atorvastatin (LIPITOR) 80 MG tablet, TAKE ONE TABLET BY MOUTH DAILY, Disp: 90 Tab, Rfl: 3  •  carvedilol (COREG) 6.25 MG Tab, TAKE 1 TABLET BY MOUTH TWICE A DAY, Disp: 180 Tab, Rfl: 3  •  losartan (COZAAR) 100 MG Tab, TAKE ONE TABLET BY MOUTH DAILY, Disp: 90 Tab, Rfl: 3  •  aspirin (ASA) 325 MG TABS, Take 325 mg by mouth every day., Disp: , Rfl:   •  VENTOLIN  (90 Base) MCG/ACT Aero Soln inhalation aerosol, , Disp: , Rfl:   ALLERGIES:   Allergies   Allergen Reactions   • Pcn [Penicillins]      SURGHX:   Past Surgical History:   Procedure Laterality Date   • STENT PLACEMENT  2008    Dr Can in [place]   • OTHER Left     foot   • OTHER      throat polyps removed (non cancerous)      SOCHX:  reports that she has been smoking Cigarettes.  She has a 10.00 pack-year smoking " "history. She has never used smokeless tobacco. She reports that she does not drink alcohol or use drugs.  FH: Family history was reviewed, no pertinent findings to report    Review of Systems   Constitutional: Positive for malaise/fatigue. Negative for chills and fever.   HENT: Positive for congestion, ear pain, sinus pain and sore throat.    Eyes: Negative for discharge and redness.   Respiratory: Positive for cough. Negative for sputum production, shortness of breath and wheezing.    Cardiovascular: Negative for chest pain, palpitations and orthopnea.   Gastrointestinal: Negative for abdominal pain, constipation, diarrhea, nausea and vomiting.   Musculoskeletal: Negative for myalgias and neck pain.   Skin: Negative for itching and rash.   Neurological: Negative for dizziness, weakness and headaches.   Endo/Heme/Allergies: Negative for environmental allergies.   All other systems reviewed and are negative.         Objective:     /76 (BP Location: Left arm, Patient Position: Sitting, BP Cuff Size: Adult)   Pulse 72   Temp 36.3 °C (97.4 °F) (Temporal)   Resp 20   Ht 1.702 m (5' 7.01\")   Wt 63.5 kg (140 lb)   SpO2 93%   BMI 21.92 kg/m²      Physical Exam   Constitutional: She is oriented to person, place, and time. Vital signs are normal. She appears well-developed and well-nourished. She is active and cooperative.  Non-toxic appearance. She does not have a sickly appearance. She does not appear ill. No distress.   HENT:   Head: Normocephalic.   Right Ear: External ear and ear canal normal. Tympanic membrane is injected.   Left Ear: External ear and ear canal normal. Tympanic membrane is injected.   Nose: Mucosal edema and rhinorrhea present. No sinus tenderness.   Mouth/Throat: Uvula is midline. Mucous membranes are dry. No uvula swelling. Posterior oropharyngeal erythema present. No oropharyngeal exudate, posterior oropharyngeal edema or tonsillar abscesses.   Eyes: Pupils are equal, round, and reactive " to light. Conjunctivae and EOM are normal.   Neck: Normal range of motion. Neck supple.   Cardiovascular: Normal rate, regular rhythm and normal heart sounds.    Pulmonary/Chest: Effort normal and breath sounds normal. No accessory muscle usage. No respiratory distress. She has no decreased breath sounds. She has no wheezes. She has no rhonchi. She has no rales.   Musculoskeletal: Normal range of motion.   Lymphadenopathy:     She has no cervical adenopathy.   Neurological: She is alert and oriented to person, place, and time.   Skin: Skin is warm and dry. She is not diaphoretic.   Vitals reviewed.              Assessment/Plan:     1. Sore throat    - POCT Rapid Strep A: NEG    2. Nasal congestion with rhinorrhea      3. Cough      4. URI with cough and congestion    - doxycycline (VIBRAMYCIN) 100 MG Tab; Take 1 Tab by mouth 2 times a day for 7 days.  Dispense: 14 Tab; Refill: 0      Increase water intake  May use Ibuprofen/Tylenol prn for any fever, body aches or throat pain  May use saline nasal spray/walter pot for nasal congestion prn  May use Nasacort/Flonase prn for nasal congestion  May use throat lozenges for throat discomfort  May gargle with salt water prn for throat discomfort  May drink smoothies for nutrition if too painful to swallow solid foods  Monitor for continued fever with treatment, any sinus pain/pressure with sinus congestion with thick mucus production and HA- need re-evaluation  Monitor for productive cough, SOB and chest pain/tightness, fever- need re-evaluation

## 2018-12-20 ENCOUNTER — TELEPHONE (OUTPATIENT)
Dept: MEDICAL GROUP | Facility: PHYSICIAN GROUP | Age: 73
End: 2018-12-20

## 2018-12-20 DIAGNOSIS — Z23 NEED FOR VACCINATION: ICD-10-CM

## 2018-12-20 NOTE — TELEPHONE ENCOUNTER
1. Caller Name:                                          Call Back Number:       Patient approves a detailed voicemail message: N\A    Patient is on the MA Schedule today for flu vaccine/injection.    SPECIFIC Action To Be Taken: Orders pending, please sign.

## 2019-01-14 RX ORDER — ESCITALOPRAM OXALATE 20 MG/1
TABLET ORAL
Qty: 90 TAB | Refills: 1 | Status: SHIPPED | OUTPATIENT
Start: 2019-01-14 | End: 2019-08-02 | Stop reason: SDUPTHER

## 2019-01-14 NOTE — TELEPHONE ENCOUNTER
Was the patient seen in the last year in this department? Yes    Does patient have an active prescription for medications requested? NO    Received Request Via: Pharmacy      Pt met protocol?: No    LAST OV 08/13/2018

## 2019-01-22 RX ORDER — LOSARTAN POTASSIUM 100 MG/1
TABLET ORAL
Qty: 90 TAB | Refills: 0 | Status: SHIPPED | OUTPATIENT
Start: 2019-01-22 | End: 2019-05-07 | Stop reason: SDUPTHER

## 2019-02-19 DIAGNOSIS — E03.4 HYPOTHYROIDISM DUE TO ACQUIRED ATROPHY OF THYROID: ICD-10-CM

## 2019-02-21 RX ORDER — LEVOTHYROXINE SODIUM 0.1 MG/1
TABLET ORAL
Qty: 90 TAB | Refills: 0 | Status: SHIPPED | OUTPATIENT
Start: 2019-02-21 | End: 2019-05-23 | Stop reason: SDUPTHER

## 2019-02-21 NOTE — TELEPHONE ENCOUNTER
Was the patient seen in the last year in this department? Yes    Does patient have an active prescription for medications requested? No     Received Request Via: Pharmacy      Pt met protocol?: Yes, OV 8/18, TSH checked 8/18 (within range)

## 2019-03-06 ENCOUNTER — APPOINTMENT (OUTPATIENT)
Dept: SLEEP MEDICINE | Facility: MEDICAL CENTER | Age: 74
End: 2019-03-06
Payer: MEDICARE

## 2019-04-15 ENCOUNTER — OFFICE VISIT (OUTPATIENT)
Dept: MEDICAL GROUP | Facility: PHYSICIAN GROUP | Age: 74
End: 2019-04-15
Payer: MEDICARE

## 2019-04-15 ENCOUNTER — TELEPHONE (OUTPATIENT)
Dept: HEMATOLOGY ONCOLOGY | Facility: MEDICAL CENTER | Age: 74
End: 2019-04-15

## 2019-04-15 VITALS
RESPIRATION RATE: 16 BRPM | HEART RATE: 54 BPM | SYSTOLIC BLOOD PRESSURE: 108 MMHG | TEMPERATURE: 97.8 F | OXYGEN SATURATION: 92 % | BODY MASS INDEX: 21.12 KG/M2 | WEIGHT: 134.6 LBS | DIASTOLIC BLOOD PRESSURE: 74 MMHG | HEIGHT: 67 IN

## 2019-04-15 DIAGNOSIS — F17.200 SMOKER: ICD-10-CM

## 2019-04-15 DIAGNOSIS — Z13.220 SCREENING FOR HYPERLIPIDEMIA: ICD-10-CM

## 2019-04-15 DIAGNOSIS — J34.9 SINUS PROBLEM: ICD-10-CM

## 2019-04-15 DIAGNOSIS — F17.210 CIGARETTE NICOTINE DEPENDENCE, UNCOMPLICATED: ICD-10-CM

## 2019-04-15 DIAGNOSIS — R63.4 WEIGHT LOSS: ICD-10-CM

## 2019-04-15 DIAGNOSIS — M43.6 NECK STIFFNESS: ICD-10-CM

## 2019-04-15 PROCEDURE — 99214 OFFICE O/P EST MOD 30 MIN: CPT | Performed by: INTERNAL MEDICINE

## 2019-04-15 RX ORDER — MONTELUKAST SODIUM 10 MG/1
10 TABLET ORAL DAILY
Qty: 30 TAB | Refills: 3 | Status: SHIPPED | OUTPATIENT
Start: 2019-04-15 | End: 2019-09-17

## 2019-04-15 NOTE — PROGRESS NOTES
PRIMARY CARE CLINIC FOLLOW UP VISIT  Chief Complaint   Patient presents with   • Weight Loss     req labs    • Sinus Problem   • Stiff Neck     Poss neck brace      History of Present Illness     Weight loss  She has been losing weight. Is down to 134 lbs from 142 lbs in 8/2018. Having sweats at night, wakes up wet. Has been trying to gain weight. Has poor dentition, cannot afford to have her teeth fixed, which limits her nutrition. Skips breakfast.     Neck stiffness  Has been having a long standing history of neck stiffness. Has had a neck strain in the past but wasn't covered on workman's comp. The other day she was putting curtains up and she pulled a muscle which down into her back/arms. Has had neck pain since but denies sharp, shooting pain down her arms. Tried heating pad, cool compresses, aspirin with some relief.     Sinus problem  Continues to have issues with nasal congestion, ear plugging, hoarseness. Has tried flonase, saline spray. Claritin makes her sick.     Current Outpatient Prescriptions   Medication Sig Dispense Refill   • lidocaine (XYLOCAINE) 2 % Gel Apply 1 Application to affected area(s) as needed. 1 Bottle 0   • montelukast (SINGULAIR) 10 MG Tab Take 1 Tab by mouth every day. 30 Tab 3   • levothyroxine (SYNTHROID) 100 MCG Tab TAKE 1 TAB BY MOUTH EVERY MORNING AT NOON TIME ON AN EMPTY STOMACH. 90 Tab 0   • losartan (COZAAR) 100 MG Tab TAKE ONE TABLET BY MOUTH DAILY 90 Tab 0   • escitalopram (LEXAPRO) 20 MG tablet TAKE 1 TABLET BY MOUTH EVERY DAY 90 Tab 1   • traZODone (DESYREL) 150 MG Tab TAKE ONE TABLET BY MOUTH TWICE A  Tab 3   • VENTOLIN  (90 Base) MCG/ACT Aero Soln inhalation aerosol      • spironolactone (ALDACTONE) 50 MG Tab TAKE ONE TABLET BY MOUTH DAILY 90 Tab 3   • atorvastatin (LIPITOR) 80 MG tablet TAKE ONE TABLET BY MOUTH DAILY 90 Tab 3   • carvedilol (COREG) 6.25 MG Tab TAKE 1 TABLET BY MOUTH TWICE A  Tab 3   • aspirin (ASA) 325 MG TABS Take 325 mg by mouth  "every day.       No current facility-administered medications for this visit.      Past Medical History:   Diagnosis Date   • AAA (abdominal aortic aneurysm) (HCC)    • CAD (coronary artery disease) 10/4/2012    2 stents [place] 2009    • Chronic insomnia 2016   • Depression 2013   • Diverticula of colon    • Hyperlipidemia 10/4/2012   • Hypertension    • Hypothyroid 10/17/2013   • Sleep apnea 2018   • Thyroid disease    • TIA (transient ischemic attack) 10/4/2012    2003      Past Surgical History:   Procedure Laterality Date   • STENT PLACEMENT      Dr Can in [place]   • OTHER Left     foot   • OTHER      throat polyps removed (non cancerous)      Social History   Substance Use Topics   • Smoking status: Current Every Day Smoker     Packs/day: 0.25     Years: 40.00     Types: Cigarettes   • Smokeless tobacco: Never Used      Comment: 3-4cigarettes daily, E CIG   • Alcohol use No     Social History     Social History Narrative    On disability      Family History   Problem Relation Age of Onset   • Heart Disease Mother    • Heart Attack Mother    • Heart Disease Father    • Heart Attack Father    • Cancer Maternal Grandmother         stomach   • Diabetes Maternal Grandmother    • Cancer Maternal Grandfather         brain   • Stroke Paternal Grandmother      Family Status   Relation Status   • Mo  at age 87   • Fa  at age 72   • MGMo (Not Specified)   • MGFa (Not Specified)   • PGMo (Not Specified)     Allergies: Pcn [penicillins]    ROS  As per HPI above. All other systems reviewed and negative.        Objective   /74   Pulse (!) 54   Temp 36.6 °C (97.8 °F)   Resp 16   Ht 1.702 m (5' 7.01\")   Wt 61.1 kg (134 lb 9.6 oz)   SpO2 92%  Body mass index is 21.08 kg/m².    General: alert and oriented, pleasant, cooperative  HEENT: Normocephalic, atraumatic. Poor dentition, several missing teeth   Cardiovascular: regular rate and rhythm, normal S1/S2  Pulmonary: lungs " clear to auscultation bilaterally  MSK: paracervical muscle tightness   Psychiatric: appropriate mood and affect. Good insight and appropriate judgment     Assessment and Plan   The following treatment plan was discussed     1. Weight loss  Her poor dentition and poor appetite is probably the main contributor as she likely isn't receiving sufficient calories. Advised protein shakes. Also needs age appropriate cancer screening with colonoscopy (last screening test was only the FIT test) and given her nicotine dependence should have an evaluation with a low dose CT. Also check TSH to rule out hyperthyroidism.   - Comp Metabolic Panel; Future  - CBC WITH DIFFERENTIAL; Future  - TSH WITH REFLEX TO FT4; Future  - REFERRAL TO GI FOR COLONOSCOPY    2. Smoker  - REFERRAL TO LUNG CANCER SCREENING PROGRAM    3. Cigarette nicotine dependence, uncomplicated   - REFERRAL TO LUNG CANCER SCREENING PROGRAM    4. Neck stiffness  Advised PT with goal to lower recurrence of future neck sprains and may trial lidocaine gel as well. She declined PT for now.   - lidocaine (XYLOCAINE) 2 % Gel; Apply 1 Application to affected area(s) as needed.  Dispense: 1 Bottle; Refill: 0    5. Sinus problem  - montelukast (SINGULAIR) 10 MG Tab; Take 1 Tab by mouth every day.  Dispense: 30 Tab; Refill: 3    6. Screening for hyperlipidemia  - Lipid Profile; Future    Healthcare maintenance     Health Maintenance Due   Topic Date Due   • Annual Wellness Visit  07/01/2018   • COLON CANCER SCREENING ANNUAL FIT  02/07/2019     Return if symptoms worsen or fail to improve.    Bc Leslie MD  Internal Medicine  [facility]

## 2019-04-15 NOTE — ASSESSMENT & PLAN NOTE
She has been losing weight. Is down to 134 lbs from 142 lbs in 8/2018. Having sweats at night, wakes up wet. Has been trying to gain weight. Has poor dentition, cannot afford to have her teeth fixed, which limits her nutrition. Skips breakfast.

## 2019-04-15 NOTE — ASSESSMENT & PLAN NOTE
Has been having a long standing history of neck stiffness. Has had a neck strain in the past but wasn't covered on workman's comp. The other day she was putting curtains up and she pulled a muscle which down into her back/arms. Has had neck pain since but denies sharp, shooting pain down her arms. Tried heating pad, cool compresses, aspirin with some relief.

## 2019-04-15 NOTE — ASSESSMENT & PLAN NOTE
Continues to have issues with nasal congestion, ear plugging, hoarseness. Has tried flonase, saline spray. Claritin makes her sick.

## 2019-04-15 NOTE — TELEPHONE ENCOUNTER
Need additional information. 1st attempt to contact the patient,- left voicemail for patient requesting a return call to verify eligibility for LCSP.

## 2019-04-16 ENCOUNTER — TELEPHONE (OUTPATIENT)
Dept: MEDICAL GROUP | Facility: PHYSICIAN GROUP | Age: 74
End: 2019-04-16

## 2019-04-16 DIAGNOSIS — M43.6 NECK STIFFNESS: ICD-10-CM

## 2019-04-16 NOTE — TELEPHONE ENCOUNTER
DOCUMENTATION OF PAR STATUS:    1. Name of Medication & Dose: lidocaine 2% gel     2. Name of Prescription Coverage Company & phone #: [employer]  Key: [id number]    3. Date Prior Auth Submitted: 04/16/19    4. What information was given to obtain insurance decision? Cover My Meds    5. Prior Auth Status? Pending    6. Patient Notified: yes

## 2019-04-19 NOTE — TELEPHONE ENCOUNTER
Per medicare this drug may be considered medically necessary if the diagnosis meets FDA approved criteria for topical anesthesia to numb the site of a dialysis port, to numb the site of an injection, or a chemo port, or to numb bug bites or a wound.

## 2019-04-19 NOTE — TELEPHONE ENCOUNTER
FINAL PRIOR AUTHORIZATION STATUS:    1.  Name of Medication & Dose: lidocaine 2.0% gel     2. Prior Auth Status: Denied.  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 Action Taken: Pharmacy Notified: no Patient Notified: yes

## 2019-05-01 NOTE — TELEPHONE ENCOUNTER
Need additional information. 3rd attempt to contact the patient,- left voicemail and mailed a letter to the patient requesting a return call to verify eligibility for LCSP.

## 2019-05-07 RX ORDER — LOSARTAN POTASSIUM 100 MG/1
TABLET ORAL
Qty: 90 TAB | Refills: 0 | Status: SHIPPED | OUTPATIENT
Start: 2019-05-07 | End: 2019-08-08 | Stop reason: SDUPTHER

## 2019-05-07 NOTE — TELEPHONE ENCOUNTER
Was the patient seen in the last year in this department? Yes    Does patient have an active prescription for medications requested? No     Received Request Via: Pharmacy      Pt met protocol?: Yes, last ov 4/19  Last labs 8/18  BP Readings from Last 1 Encounters:   04/15/19 108/74

## 2019-05-23 DIAGNOSIS — E03.4 HYPOTHYROIDISM DUE TO ACQUIRED ATROPHY OF THYROID: ICD-10-CM

## 2019-05-23 RX ORDER — LEVOTHYROXINE SODIUM 0.1 MG/1
TABLET ORAL
Qty: 90 TAB | Refills: 1 | Status: SHIPPED | OUTPATIENT
Start: 2019-05-23 | End: 2019-09-17

## 2019-06-28 DIAGNOSIS — E78.5 HYPERLIPIDEMIA, UNSPECIFIED HYPERLIPIDEMIA TYPE: ICD-10-CM

## 2019-07-01 RX ORDER — ATORVASTATIN CALCIUM 80 MG/1
80 TABLET, FILM COATED ORAL DAILY
Qty: 90 TAB | Refills: 0 | Status: SHIPPED | OUTPATIENT
Start: 2019-07-01 | End: 2019-09-13 | Stop reason: SDUPTHER

## 2019-08-02 RX ORDER — ESCITALOPRAM OXALATE 20 MG/1
TABLET ORAL
Qty: 90 TAB | Refills: 1 | Status: SHIPPED | OUTPATIENT
Start: 2019-08-02 | End: 2020-02-21

## 2019-08-02 NOTE — TELEPHONE ENCOUNTER
Was the patient seen in the last year in this department? Yes    Does patient have an active prescription for medications requested? No     Received Request Via: Pharmacy      Pt met protocol?: Yes    LAST OV 04/15/2019

## 2019-08-09 NOTE — TELEPHONE ENCOUNTER
Was the patient seen in the last year in this department? Yes    Does patient have an active prescription for medications requested? No     Received Request Via: Pharmacy    Pt met protocol?: Yes     Last OV 04/15/19    BP Readings from Last 1 Encounters:   04/15/19 108/74

## 2019-08-11 RX ORDER — LOSARTAN POTASSIUM 100 MG/1
TABLET ORAL
Qty: 90 TAB | Refills: 0 | Status: SHIPPED | OUTPATIENT
Start: 2019-08-11 | End: 2019-09-17

## 2019-09-12 DIAGNOSIS — E78.5 HYPERLIPIDEMIA, UNSPECIFIED HYPERLIPIDEMIA TYPE: ICD-10-CM

## 2019-09-12 DIAGNOSIS — I10 ESSENTIAL HYPERTENSION: ICD-10-CM

## 2019-09-13 ENCOUNTER — HOSPITAL ENCOUNTER (OUTPATIENT)
Dept: LAB | Facility: MEDICAL CENTER | Age: 74
End: 2019-09-13
Attending: INTERNAL MEDICINE
Payer: MEDICARE

## 2019-09-13 DIAGNOSIS — Z13.220 SCREENING FOR HYPERLIPIDEMIA: ICD-10-CM

## 2019-09-13 DIAGNOSIS — R63.4 WEIGHT LOSS: ICD-10-CM

## 2019-09-13 LAB
ALBUMIN SERPL BCP-MCNC: 3.7 G/DL (ref 3.2–4.9)
ALBUMIN/GLOB SERPL: 1.2 G/DL
ALP SERPL-CCNC: 84 U/L (ref 30–99)
ALT SERPL-CCNC: 27 U/L (ref 2–50)
ANION GAP SERPL CALC-SCNC: 8 MMOL/L (ref 0–11.9)
AST SERPL-CCNC: 53 U/L (ref 12–45)
BASOPHILS # BLD AUTO: 0.5 % (ref 0–1.8)
BASOPHILS # BLD: 0.02 K/UL (ref 0–0.12)
BILIRUB SERPL-MCNC: 0.6 MG/DL (ref 0.1–1.5)
BUN SERPL-MCNC: 19 MG/DL (ref 8–22)
CALCIUM SERPL-MCNC: 9.4 MG/DL (ref 8.5–10.5)
CHLORIDE SERPL-SCNC: 107 MMOL/L (ref 96–112)
CO2 SERPL-SCNC: 23 MMOL/L (ref 20–33)
CREAT SERPL-MCNC: 1.08 MG/DL (ref 0.5–1.4)
EOSINOPHIL # BLD AUTO: 0.09 K/UL (ref 0–0.51)
EOSINOPHIL NFR BLD: 2.4 % (ref 0–6.9)
ERYTHROCYTE [DISTWIDTH] IN BLOOD BY AUTOMATED COUNT: 48.7 FL (ref 35.9–50)
GLOBULIN SER CALC-MCNC: 3.2 G/DL (ref 1.9–3.5)
GLUCOSE SERPL-MCNC: 95 MG/DL (ref 65–99)
HCT VFR BLD AUTO: 39.6 % (ref 37–47)
HGB BLD-MCNC: 13 G/DL (ref 12–16)
IMM GRANULOCYTES # BLD AUTO: 0 K/UL (ref 0–0.11)
IMM GRANULOCYTES NFR BLD AUTO: 0 % (ref 0–0.9)
LYMPHOCYTES # BLD AUTO: 0.85 K/UL (ref 1–4.8)
LYMPHOCYTES NFR BLD: 22.6 % (ref 22–41)
MCH RBC QN AUTO: 32.7 PG (ref 27–33)
MCHC RBC AUTO-ENTMCNC: 32.8 G/DL (ref 33.6–35)
MCV RBC AUTO: 99.7 FL (ref 81.4–97.8)
MONOCYTES # BLD AUTO: 0.31 K/UL (ref 0–0.85)
MONOCYTES NFR BLD AUTO: 8.2 % (ref 0–13.4)
NEUTROPHILS # BLD AUTO: 2.49 K/UL (ref 2–7.15)
NEUTROPHILS NFR BLD: 66.3 % (ref 44–72)
NRBC # BLD AUTO: 0 K/UL
NRBC BLD-RTO: 0 /100 WBC
PLATELET # BLD AUTO: 136 K/UL (ref 164–446)
PMV BLD AUTO: 11.4 FL (ref 9–12.9)
POTASSIUM SERPL-SCNC: 3.9 MMOL/L (ref 3.6–5.5)
PROT SERPL-MCNC: 6.9 G/DL (ref 6–8.2)
RBC # BLD AUTO: 3.97 M/UL (ref 4.2–5.4)
SODIUM SERPL-SCNC: 138 MMOL/L (ref 135–145)
T4 FREE SERPL-MCNC: 1.47 NG/DL (ref 0.53–1.43)
TSH SERPL DL<=0.005 MIU/L-ACNC: 0.31 UIU/ML (ref 0.38–5.33)
WBC # BLD AUTO: 3.8 K/UL (ref 4.8–10.8)

## 2019-09-13 PROCEDURE — 80053 COMPREHEN METABOLIC PANEL: CPT

## 2019-09-13 PROCEDURE — 36415 COLL VENOUS BLD VENIPUNCTURE: CPT

## 2019-09-13 PROCEDURE — 85025 COMPLETE CBC W/AUTO DIFF WBC: CPT

## 2019-09-13 PROCEDURE — 84439 ASSAY OF FREE THYROXINE: CPT

## 2019-09-13 PROCEDURE — 84443 ASSAY THYROID STIM HORMONE: CPT

## 2019-09-13 RX ORDER — CARVEDILOL 6.25 MG/1
TABLET ORAL
Qty: 60 TAB | Refills: 0 | Status: SHIPPED | OUTPATIENT
Start: 2019-09-13 | End: 2019-09-17 | Stop reason: SDUPTHER

## 2019-09-13 RX ORDER — ATORVASTATIN CALCIUM 80 MG/1
80 TABLET, FILM COATED ORAL DAILY
Qty: 30 TAB | Refills: 0 | Status: SHIPPED | OUTPATIENT
Start: 2019-09-13 | End: 2019-10-17 | Stop reason: SDUPTHER

## 2019-09-13 RX ORDER — LOSARTAN POTASSIUM 100 MG/1
TABLET ORAL
Qty: 90 TAB | Refills: 0 | OUTPATIENT
Start: 2019-09-13

## 2019-09-13 RX ORDER — SPIRONOLACTONE 50 MG/1
50 TABLET, FILM COATED ORAL DAILY
Qty: 30 TAB | Refills: 0 | Status: SHIPPED | OUTPATIENT
Start: 2019-09-13 | End: 2019-10-17 | Stop reason: SDUPTHER

## 2019-09-13 NOTE — TELEPHONE ENCOUNTER
Please advise pt that she has been reminded to get labs done and that has not happened. Will only send 1 month to pharmacy and next request will be denied.

## 2019-09-13 NOTE — TELEPHONE ENCOUNTER
Phone Number Called: [phone] (home)     Call outcome: spoke to patient regarding message below

## 2019-09-13 NOTE — TELEPHONE ENCOUNTER
*PT HAS BEEN GIVEN NOTE TWICE, TO GET LABS FOR FURTHER REFILLS ON ATORVASTATIN AND SPIRONOLACTONE. [facility] AS WELL ALREADY REQUESTED LABS AND JUST SENT A 90 DS RX ON 08/11/2019*  Was the patient seen in the last year in this department? Yes    Does patient have an active prescription for medications requested? No     Received Request Via: Pharmacy      Pt met protocol?: No    LAST OV 04/15/2019    Lab Results   Component Value Date/Time    CHOLSTRLTOT 113 05/07/2018 09:25 AM    LDL 61 05/07/2018 09:25 AM    HDL 38 (A) 05/07/2018 09:25 AM    TRIGLYCERIDE 69 05/07/2018 09:25 AM       Lab Results   Component Value Date/Time    SODIUM 139 05/07/2018 09:25 AM    POTASSIUM 3.8 05/07/2018 09:25 AM    CHLORIDE 109 05/07/2018 09:25 AM    CO2 25 05/07/2018 09:25 AM    GLUCOSE 100 (H) 05/07/2018 09:25 AM    BUN 14 05/07/2018 09:25 AM    CREATININE 0.89 05/07/2018 09:25 AM     Lab Results   Component Value Date/Time    ALKPHOSPHAT 88 05/07/2018 09:25 AM    ASTSGOT 52 (H) 05/07/2018 09:25 AM    ALTSGPT 31 05/07/2018 09:25 AM    TBILIRUBIN 0.5 05/07/2018 09:25 AM      BP Readings from Last 1 Encounters:   04/15/19 108/74     Lab Results   Component Value Date/Time    CHOLSTRLTOT 113 05/07/2018 09:25 AM    LDL 61 05/07/2018 09:25 AM    HDL 38 (A) 05/07/2018 09:25 AM    TRIGLYCERIDE 69 05/07/2018 09:25 AM       Lab Results   Component Value Date/Time    SODIUM 139 05/07/2018 09:25 AM    POTASSIUM 3.8 05/07/2018 09:25 AM    CHLORIDE 109 05/07/2018 09:25 AM    CO2 25 05/07/2018 09:25 AM    GLUCOSE 100 (H) 05/07/2018 09:25 AM    BUN 14 05/07/2018 09:25 AM    CREATININE 0.89 05/07/2018 09:25 AM     Lab Results   Component Value Date/Time    ALKPHOSPHAT 88 05/07/2018 09:25 AM    ASTSGOT 52 (H) 05/07/2018 09:25 AM    ALTSGPT 31 05/07/2018 09:25 AM    TBILIRUBIN 0.5 05/07/2018 09:25 AM

## 2019-09-17 ENCOUNTER — OFFICE VISIT (OUTPATIENT)
Dept: MEDICAL GROUP | Facility: PHYSICIAN GROUP | Age: 74
End: 2019-09-17
Payer: MEDICARE

## 2019-09-17 ENCOUNTER — HOSPITAL ENCOUNTER (OUTPATIENT)
Dept: LAB | Facility: MEDICAL CENTER | Age: 74
End: 2019-09-17
Attending: FAMILY MEDICINE
Payer: MEDICARE

## 2019-09-17 ENCOUNTER — APPOINTMENT (OUTPATIENT)
Dept: RADIOLOGY | Facility: IMAGING CENTER | Age: 74
End: 2019-09-17
Attending: FAMILY MEDICINE
Payer: MEDICARE

## 2019-09-17 VITALS
OXYGEN SATURATION: 97 % | SYSTOLIC BLOOD PRESSURE: 100 MMHG | HEIGHT: 67 IN | WEIGHT: 134 LBS | HEART RATE: 78 BPM | TEMPERATURE: 98 F | BODY MASS INDEX: 21.03 KG/M2 | RESPIRATION RATE: 18 BRPM | DIASTOLIC BLOOD PRESSURE: 68 MMHG

## 2019-09-17 DIAGNOSIS — M79.601 PAIN OF RIGHT UPPER EXTREMITY: ICD-10-CM

## 2019-09-17 DIAGNOSIS — N18.30 STAGE 3 CHRONIC KIDNEY DISEASE (HCC): ICD-10-CM

## 2019-09-17 DIAGNOSIS — I10 ESSENTIAL HYPERTENSION: ICD-10-CM

## 2019-09-17 DIAGNOSIS — E03.4 HYPOTHYROIDISM DUE TO ACQUIRED ATROPHY OF THYROID: ICD-10-CM

## 2019-09-17 DIAGNOSIS — R71.8 ELEVATED MCV: ICD-10-CM

## 2019-09-17 DIAGNOSIS — Z12.11 SCREENING FOR COLORECTAL CANCER: ICD-10-CM

## 2019-09-17 DIAGNOSIS — Z23 NEED FOR VACCINATION: ICD-10-CM

## 2019-09-17 DIAGNOSIS — R63.4 WEIGHT LOSS: ICD-10-CM

## 2019-09-17 DIAGNOSIS — Z12.12 SCREENING FOR COLORECTAL CANCER: ICD-10-CM

## 2019-09-17 DIAGNOSIS — Z78.0 POSTMENOPAUSAL: ICD-10-CM

## 2019-09-17 LAB — VIT B12 SERPL-MCNC: 278 PG/ML (ref 211–911)

## 2019-09-17 PROCEDURE — 82607 VITAMIN B-12: CPT

## 2019-09-17 PROCEDURE — 36415 COLL VENOUS BLD VENIPUNCTURE: CPT

## 2019-09-17 PROCEDURE — G0008 ADMIN INFLUENZA VIRUS VAC: HCPCS | Performed by: FAMILY MEDICINE

## 2019-09-17 PROCEDURE — 90662 IIV NO PRSV INCREASED AG IM: CPT | Performed by: FAMILY MEDICINE

## 2019-09-17 PROCEDURE — 73060 X-RAY EXAM OF HUMERUS: CPT | Mod: TC,RT | Performed by: FAMILY MEDICINE

## 2019-09-17 PROCEDURE — 99214 OFFICE O/P EST MOD 30 MIN: CPT | Mod: 25 | Performed by: FAMILY MEDICINE

## 2019-09-17 RX ORDER — HYDROCODONE BITARTRATE AND ACETAMINOPHEN 5; 325 MG/1; MG/1
TABLET ORAL
Qty: 10 TAB | Refills: 0 | Status: SHIPPED | OUTPATIENT
Start: 2019-09-17 | End: 2019-09-23

## 2019-09-17 RX ORDER — LEVOTHYROXINE SODIUM 88 UG/1
88 TABLET ORAL
Qty: 90 TAB | Refills: 3 | Status: SHIPPED | OUTPATIENT
Start: 2019-09-17 | End: 2020-04-08 | Stop reason: SDUPTHER

## 2019-09-17 RX ORDER — LOSARTAN POTASSIUM 50 MG/1
50 TABLET ORAL DAILY
Qty: 30 TAB | Refills: 5 | Status: SHIPPED | OUTPATIENT
Start: 2019-09-17 | End: 2019-10-15

## 2019-09-17 RX ORDER — CARVEDILOL 6.25 MG/1
TABLET ORAL
Qty: 60 TAB | Refills: 0 | Status: SHIPPED | OUTPATIENT
Start: 2019-09-17 | End: 2019-10-15

## 2019-09-17 ASSESSMENT — PATIENT HEALTH QUESTIONNAIRE - PHQ9
2. FEELING DOWN, DEPRESSED, IRRITABLE, OR HOPELESS: NOT AT ALL
3. TROUBLE FALLING OR STAYING ASLEEP OR SLEEPING TOO MUCH: NOT AT ALL
4. FEELING TIRED OR HAVING LITTLE ENERGY: NOT AT ALL
5. POOR APPETITE OR OVEREATING: NOT AT ALL
7. TROUBLE CONCENTRATING ON THINGS, SUCH AS READING THE NEWSPAPER OR WATCHING TELEVISION: NOT AT ALL
9. THOUGHTS THAT YOU WOULD BE BETTER OFF DEAD, OR OF HURTING YOURSELF: NOT AT ALL
6. FEELING BAD ABOUT YOURSELF - OR THAT YOU ARE A FAILURE OR HAVE LET YOURSELF OR YOUR FAMILY DOWN: NOT AL ALL
SUM OF ALL RESPONSES TO PHQ QUESTIONS 1-9: 0
1. LITTLE INTEREST OR PLEASURE IN DOING THINGS: NOT AT ALL
8. MOVING OR SPEAKING SO SLOWLY THAT OTHER PEOPLE COULD HAVE NOTICED. OR THE OPPOSITE, BEING SO FIGETY OR RESTLESS THAT YOU HAVE BEEN MOVING AROUND A LOT MORE THAN USUAL: NOT AT ALL
SUM OF ALL RESPONSES TO PHQ9 QUESTIONS 1 AND 2: 0

## 2019-09-17 NOTE — PROGRESS NOTES
cc: hypotension      Subjective:     Evie Morillo is a 74 y.o. female presenting for the following:     Hypotension: Patient with some weight loss over the last year and has had worsening hypotension since then.  She will often feel lightheaded when standing and she does claim to feel this more when her blood pressure is low.  She has not been taking carvedilol for the last 4 weeks as she ran out of this medication. Patient denies any chest pain, shortness of breath, palpitations, swelling.    Her weight loss is mostly due to decreased appetite and also difficulty chewing due to her teeth.  She is trying to increase her soft foods.  No night sweats, fevers. she has declined referral to lung cancer screening but does agree to a colonoscopy.    Patient with mechanical fall onto right side about 5-6 weeks ago. She has had continued pain in the right arm. No shoulder pain or elbow pain. The pain is through the distal humerus. No weakness in the hand or tingling or numbness.       Review of systems:  All others reviewed and are negative.       Current Outpatient Medications:   •  levothyroxine (SYNTHROID) 88 MCG Tab, Take 1 Tab by mouth Every morning on an empty stomach., Disp: 90 Tab, Rfl: 3  •  HYDROcodone-acetaminophen (NORCO) 5-325 MG Tab per tablet, Take 1/2-1 tab daily as needed for severe pain., Disp: 10 Tab, Rfl: 0  •  carvedilol (COREG) 6.25 MG Tab, TAKE 1 TABLET BY MOUTH TWICE A DAY, Disp: 60 Tab, Rfl: 0  •  losartan (COZAAR) 50 MG Tab, Take 1 Tab by mouth every day., Disp: 30 Tab, Rfl: 5  •  spironolactone (ALDACTONE) 50 MG Tab, Take 1 Tab by mouth every day. TAKE ONE TABLET BY MOUTH DAILY, Disp: 30 Tab, Rfl: 0  •  atorvastatin (LIPITOR) 80 MG tablet, Take 1 Tab by mouth every day. Needs to be seen for further refills. Thank you, Disp: 30 Tab, Rfl: 0  •  escitalopram (LEXAPRO) 20 MG tablet, TAKE 1 TABLET BY MOUTH EVERY DAY, Disp: 90 Tab, Rfl: 1  •  traZODone (DESYREL) 150 MG Tab, TAKE ONE TABLET BY MOUTH  "TWICE A DAY, Disp: 180 Tab, Rfl: 3  •  aspirin (ASA) 325 MG TABS, Take 325 mg by mouth every day., Disp: , Rfl:     Allergies, past medical history, past surgical history, family history, social history reviewed and updated    Objective:     Vitals: /68 (BP Location: Left arm, Patient Position: Sitting, BP Cuff Size: Adult)   Pulse 78   Temp 36.7 °C (98 °F) (Temporal)   Resp 18   Ht 1.702 m (5' 7\")   Wt 60.8 kg (134 lb)   SpO2 97%   BMI 20.99 kg/m²   General: Alert, pleasant, NAD  Heart: Regular rate and rhythm.    Respiratory: Normal respiratory effort.  Clear to auscultation bilaterally.  Extremities: No leg edema.  Right distal humerus with point tender to palpation.  No overlying skin changes.  Elbow with full range of motion.  Neurological: CN2-12 grossly intact    Assessment/Plan:     Evie was seen today for follow-up.    Diagnoses and all orders for this visit:    Screening for colorectal cancer: Patient does agree to colonoscopy for cancer screening.  -     REFERRAL TO GI FOR COLONOSCOPY    Need for vaccination Patient due for vaccination.  Patient warned of possible side effect including pain, reaction at injection site, fatigue, low-grade fever.  Also, patient warned of uncommon severe reactions including allergic reaction/anaphylaxis.   -     INFLUENZA VACCINE, HIGH DOSE (65+ ONLY)    Elevated MCV  -     VITAMIN B12; Future    Stage 3 chronic kidney disease (HCC): Stable chronic problem.    Essential hypertension: We will refill carvedilol but will also decrease dose of losartan to 50 mg from 100 mg.  Patient is hypotensive today and was on last visit as well and she was not taking carvedilol at that time.  Patient warned of side effect of lightheadedness and will return to clinic in 4 weeks for blood pressure check.  -     carvedilol (COREG) 6.25 MG Tab; TAKE 1 TABLET BY MOUTH TWICE A DAY    Hypothyroidism due to acquired atrophy of thyroid: slightly overcorrected on last labs. Will " decreased dose 100mcg -> 88mcg.   -     levothyroxine (SYNTHROID) 88 MCG Tab; Take 1 Tab by mouth Every morning on an empty stomach.  -     DS-BONE DENSITY STUDY (DEXA); Future    Pain of right upper extremity: We will obtain x-ray to ensure no fracture, as patient has had continued pain since fall 5 weeks ago.  If no fracture will refer to physiatry for pain management.  Patient warned of common side effects of Norco including constipation, sedation, confusion.  Patient warned to not take with alcohol or drive.  -     DX-HUMERUS 2+ RIGHT; Future  -     HYDROcodone-acetaminophen (NORCO) 5-325 MG Tab per tablet; Take 1/2-1 tab daily as needed for severe pain.    -Xray without fracture. Will refer to physiatry.     Postmenopausal  -     DS-BONE DENSITY STUDY (DEXA); Future    Weight loss : Patient previously seen for this she is very sure that it is due to her teeth and refuses any cancer screening or work-up besides a colonoscopy.    other orders  -     Consent for Opiate Prescription  -     losartan (COZAAR) 50 MG Tab; Take 1 Tab by mouth every day.    Return in about 4 weeks (around 10/15/2019), or if symptoms worsen or fail to improve.

## 2019-09-20 ENCOUNTER — HOSPITAL ENCOUNTER (OUTPATIENT)
Dept: RADIOLOGY | Facility: MEDICAL CENTER | Age: 74
End: 2019-09-20
Attending: FAMILY MEDICINE
Payer: MEDICARE

## 2019-09-20 DIAGNOSIS — E03.4 HYPOTHYROIDISM DUE TO ACQUIRED ATROPHY OF THYROID: ICD-10-CM

## 2019-09-20 DIAGNOSIS — Z78.0 POSTMENOPAUSAL: ICD-10-CM

## 2019-09-20 PROCEDURE — 77080 DXA BONE DENSITY AXIAL: CPT

## 2019-09-23 ENCOUNTER — TELEPHONE (OUTPATIENT)
Dept: MEDICAL GROUP | Facility: PHYSICIAN GROUP | Age: 74
End: 2019-09-23

## 2019-09-23 NOTE — TELEPHONE ENCOUNTER
1. Caller Name: Evie                                         Call Back Number: [phone] (home)       Patient approves a detailed voicemail message: N\A    Pt would like to know if she needs a b-12 supplement. Please advise.

## 2019-09-24 NOTE — TELEPHONE ENCOUNTER
Her last blood test for vitamin B12 was normal.  However, generally there is no harm in an oral daily supplement (any of the over-the-counter B complex vitamins will have this in a high dose), especially in the elderly.

## 2019-09-24 NOTE — TELEPHONE ENCOUNTER
Phone Number Called: [phone] (home)     Call outcome: left message for patient to call back regarding message below

## 2019-10-02 DIAGNOSIS — E78.5 HYPERLIPIDEMIA, UNSPECIFIED HYPERLIPIDEMIA TYPE: ICD-10-CM

## 2019-10-02 RX ORDER — ATORVASTATIN CALCIUM 80 MG/1
80 TABLET, FILM COATED ORAL DAILY
Qty: 60 TAB | Refills: 0 | Status: SHIPPED | OUTPATIENT
Start: 2019-10-02 | End: 2019-10-15

## 2019-10-04 DIAGNOSIS — J34.9 SINUS PROBLEM: ICD-10-CM

## 2019-10-04 NOTE — TELEPHONE ENCOUNTER
Was the patient seen in the last year in this department? Yes    Does patient have an active prescription for medications requested? No     Received Request Via: Pharmacy      Pt met protocol?: YES    OV 9/19

## 2019-10-07 RX ORDER — MONTELUKAST SODIUM 10 MG/1
10 TABLET ORAL DAILY
Qty: 30 TAB | Refills: 2 | Status: SHIPPED | OUTPATIENT
Start: 2019-10-07 | End: 2020-03-12

## 2019-10-15 ENCOUNTER — OFFICE VISIT (OUTPATIENT)
Dept: MEDICAL GROUP | Facility: PHYSICIAN GROUP | Age: 74
End: 2019-10-15
Payer: MEDICARE

## 2019-10-15 VITALS
HEIGHT: 67 IN | RESPIRATION RATE: 18 BRPM | SYSTOLIC BLOOD PRESSURE: 108 MMHG | DIASTOLIC BLOOD PRESSURE: 70 MMHG | OXYGEN SATURATION: 94 % | HEART RATE: 60 BPM | BODY MASS INDEX: 21.03 KG/M2 | TEMPERATURE: 97.8 F | WEIGHT: 134 LBS

## 2019-10-15 DIAGNOSIS — I71.40 ABDOMINAL AORTIC ANEURYSM (AAA) WITHOUT RUPTURE (HCC): ICD-10-CM

## 2019-10-15 DIAGNOSIS — M85.89 OSTEOPENIA OF MULTIPLE SITES: ICD-10-CM

## 2019-10-15 DIAGNOSIS — R63.4 WEIGHT LOSS: ICD-10-CM

## 2019-10-15 DIAGNOSIS — I10 ESSENTIAL HYPERTENSION: ICD-10-CM

## 2019-10-15 DIAGNOSIS — E03.4 HYPOTHYROIDISM DUE TO ACQUIRED ATROPHY OF THYROID: ICD-10-CM

## 2019-10-15 DIAGNOSIS — R94.4 DECREASED GFR: ICD-10-CM

## 2019-10-15 DIAGNOSIS — D69.6 THROMBOCYTOPENIA (HCC): ICD-10-CM

## 2019-10-15 PROCEDURE — 99214 OFFICE O/P EST MOD 30 MIN: CPT | Performed by: FAMILY MEDICINE

## 2019-10-15 RX ORDER — LOSARTAN POTASSIUM 25 MG/1
25 TABLET ORAL DAILY
Qty: 30 TAB | Refills: 5 | Status: SHIPPED | OUTPATIENT
Start: 2019-10-15 | End: 2020-09-17

## 2019-10-15 RX ORDER — CARVEDILOL 6.25 MG/1
TABLET ORAL
Qty: 90 TAB | Refills: 3 | Status: SHIPPED | OUTPATIENT
Start: 2019-10-15 | End: 2020-03-12

## 2019-10-15 NOTE — PROGRESS NOTES
cc: HTN      Subjective:     Evie Morillo is a 74 y.o. female presenting for the following:     Hypertension/AAA: Patient has been taking spironolactone, Carvedilol, losartan daily. Patient denies any chest pain, shortness of breath, palpitations, swelling.  She has been feeling slightly lightheaded at times and she does have low blood pressure when she checks it.  She does have a known AAA.  This is asymptomatic.    Weight loss: Patient has had ongoing issues.  She is sure that this is due to her teeth and difficulty chewing things.  She is not very keen on soft foods that she is not eating her normal amount.  She otherwise feels okay with no night sweats, rashes, new pain.    Hypothyroidism: Decreased dose of Synthroid on last visit.  She has felt okay. Denies feeling hot/cold, constipation/diarrhea, palpitations, racing heart, weight gain/loss, or thinning hair.      Review of systems:  All others reviewed and are negative.       Current Outpatient Medications:   •  carvedilol (COREG) 6.25 MG Tab, TAKE 1 TABLET BY MOUTH DAILY, Disp: 90 Tab, Rfl: 3  •  losartan (COZAAR) 25 MG Tab, Take 1 Tab by mouth every day., Disp: 30 Tab, Rfl: 5  •  spironolactone (ALDACTONE) 50 MG Tab, TAKE 1 TAB BY MOUTH EVERY DAY. TAKE ONE TABLET BY MOUTH DAILY, Disp: 90 Tab, Rfl: 0  •  atorvastatin (LIPITOR) 80 MG tablet, Take 1 Tab by mouth every day. Needs to be seen for further refills. Thank you, Disp: 90 Tab, Rfl: 0  •  montelukast (SINGULAIR) 10 MG Tab, Take 1 Tab by mouth every day., Disp: 30 Tab, Rfl: 2  •  levothyroxine (SYNTHROID) 88 MCG Tab, Take 1 Tab by mouth Every morning on an empty stomach., Disp: 90 Tab, Rfl: 3  •  escitalopram (LEXAPRO) 20 MG tablet, TAKE 1 TABLET BY MOUTH EVERY DAY, Disp: 90 Tab, Rfl: 1  •  traZODone (DESYREL) 150 MG Tab, TAKE ONE TABLET BY MOUTH TWICE A DAY, Disp: 180 Tab, Rfl: 3  •  aspirin (ASA) 325 MG TABS, Take 325 mg by mouth every day., Disp: , Rfl:     Allergies, past medical history, past  "surgical history, family history, social history reviewed and updated    Objective:     Vitals: /70 (BP Location: Right arm, Patient Position: Sitting, BP Cuff Size: Adult)   Pulse 60   Temp 36.6 °C (97.8 °F) (Temporal)   Resp 18   Ht 1.702 m (5' 7\")   Wt 60.8 kg (134 lb)   SpO2 94%   BMI 20.99 kg/m²   General: Alert, pleasant, NAD  HEENT: Normocephalic.   EOMI, no icterus or pallor.  Conjunctivae and lids normal. External ears normal. Oropharynx non-erythematous, mucous membranes moist.    Neck supple.  No thyromegaly or masses palpated. No cervical or supraclavicular lymphadenopathy.  Heart: Regular rate and rhythm.    Respiratory: Normal respiratory effort.  Clear to auscultation bilaterally.  Neurological: No tremors,  CN2-12 grossly intact  Psych:  Affect is normal, judgement is good, memory is intact, grooming is appropriate.    Assessment/Plan:     Evie was seen today for follow-up.    Diagnoses and all orders for this visit:    Essential hypertension/AAA: Patient has been lightheaded at times with a low blood pressure.  She has not had any syncope or near syncope.  She is slightly low today.  Will decrease dose of losartan from 50 mg to 25 mg daily.  -patient does have appointment with her cardiologist who does monitor her AAA.   -     carvedilol (COREG) 6.25 MG Tab; TAKE 1 TABLET BY MOUTH DAILY  -     losartan (COZAAR) 25 MG Tab; Take 1 Tab by mouth every day.    Osteopenia of multiple sites Patient encouraged to take a vitamin D supplement daily.  Also encouraged to eat yogurt or calcium rich foods daily.  Patient would decline a bisphosphonate currently regardless.    Thrombocytopenia (HCC): Found incidentally on recent blood tests.  Will check to ensure stability.  Patient denies any easy bruising or bleeding.  -     CBC WITHOUT DIFFERENTIAL; Future    Hypothyroidism due to acquired atrophy of thyroid: Patient did decrease dose of Synthroid due to being overcorrected.  We will recheck " blood test to ensure that she is now to correct dosing.  -     FREE THYROXINE; Future  -     TSH; Future    Weight loss: Chronic issue due to patient's teeth.  She declines any further cancer work-up currently.    decreased GFR: We will monitor for stability.  -     Basic Metabolic Panel; Future      Return in about 6 months (around 4/15/2020), or if symptoms worsen or fail to improve.

## 2019-10-17 DIAGNOSIS — E78.5 HYPERLIPIDEMIA, UNSPECIFIED HYPERLIPIDEMIA TYPE: ICD-10-CM

## 2019-10-17 DIAGNOSIS — I10 ESSENTIAL HYPERTENSION: ICD-10-CM

## 2019-10-17 RX ORDER — ATORVASTATIN CALCIUM 80 MG/1
80 TABLET, FILM COATED ORAL DAILY
Qty: 90 TAB | Refills: 0 | Status: SHIPPED | OUTPATIENT
Start: 2019-10-17 | End: 2019-12-02 | Stop reason: SDUPTHER

## 2019-10-17 RX ORDER — ATORVASTATIN CALCIUM 80 MG/1
80 TABLET, FILM COATED ORAL DAILY
OUTPATIENT
Start: 2019-10-17

## 2019-10-17 RX ORDER — CARVEDILOL 6.25 MG/1
TABLET ORAL
OUTPATIENT
Start: 2019-10-17

## 2019-10-17 RX ORDER — SPIRONOLACTONE 50 MG/1
50 TABLET, FILM COATED ORAL DAILY
Qty: 90 TAB | Refills: 0 | Status: SHIPPED | OUTPATIENT
Start: 2019-10-17 | End: 2019-12-18

## 2019-10-17 NOTE — TELEPHONE ENCOUNTER
Was the patient seen in the last year in this department? Yes    Does patient have an active prescription for medications requested? No     Received Request Via: Pharmacy      Pt met protocol?: Yes    LAST OV 10/15/2019    BP Readings from Last 1 Encounters:   10/15/19 108/70     Lab Results   Component Value Date/Time    SODIUM 138 09/13/2019 09:59 AM    POTASSIUM 3.9 09/13/2019 09:59 AM    CHLORIDE 107 09/13/2019 09:59 AM    CO2 23 09/13/2019 09:59 AM    GLUCOSE 95 09/13/2019 09:59 AM    BUN 19 09/13/2019 09:59 AM    CREATININE 1.08 09/13/2019 09:59 AM

## 2019-10-21 ENCOUNTER — TELEPHONE (OUTPATIENT)
Dept: MEDICAL GROUP | Facility: PHYSICIAN GROUP | Age: 74
End: 2019-10-21

## 2019-10-21 NOTE — TELEPHONE ENCOUNTER
Patient states that she will see Dr. Pulido next month, 11/14/19. She will have a scan done at that appointment. She will make sure we get a copy of the notes.

## 2019-10-24 DIAGNOSIS — F32.A DEPRESSION, UNSPECIFIED DEPRESSION TYPE: ICD-10-CM

## 2019-10-24 RX ORDER — TRAZODONE HYDROCHLORIDE 150 MG/1
TABLET ORAL
Qty: 180 TAB | Refills: 0 | Status: SHIPPED | OUTPATIENT
Start: 2019-10-24 | End: 2020-01-24

## 2019-11-11 RX ORDER — LOSARTAN POTASSIUM 100 MG/1
TABLET ORAL
OUTPATIENT
Start: 2019-11-11

## 2019-11-14 ENCOUNTER — OFFICE VISIT (OUTPATIENT)
Dept: CARDIOLOGY | Facility: MEDICAL CENTER | Age: 74
End: 2019-11-14
Payer: MEDICARE

## 2019-11-14 VITALS
WEIGHT: 134 LBS | DIASTOLIC BLOOD PRESSURE: 54 MMHG | OXYGEN SATURATION: 95 % | HEIGHT: 67 IN | HEART RATE: 60 BPM | BODY MASS INDEX: 21.03 KG/M2 | SYSTOLIC BLOOD PRESSURE: 112 MMHG

## 2019-11-14 DIAGNOSIS — Z72.0 TOBACCO ABUSE: ICD-10-CM

## 2019-11-14 DIAGNOSIS — I71.20 THORACIC AORTIC ANEURYSM WITHOUT RUPTURE (HCC): ICD-10-CM

## 2019-11-14 DIAGNOSIS — I10 ESSENTIAL HYPERTENSION: ICD-10-CM

## 2019-11-14 DIAGNOSIS — I71.40 ABDOMINAL AORTIC ANEURYSM (AAA) WITHOUT RUPTURE (HCC): ICD-10-CM

## 2019-11-14 DIAGNOSIS — E78.2 MIXED HYPERLIPIDEMIA: ICD-10-CM

## 2019-11-14 PROCEDURE — 99214 OFFICE O/P EST MOD 30 MIN: CPT | Performed by: INTERNAL MEDICINE

## 2019-11-14 NOTE — PROGRESS NOTES
Chief Complaint   Patient presents with   • Coronary Artery Disease       Subjective:   Evie Morillo is a 74 y.o. female who presents today for follow-up of coronary artery disease hypertension as well as AAA.  Her AAA was previously followed by Dr. Beaver and is stable as of January 2018.  Blood pressures have been well controlled.      In the interim she continues to smoke and has decided not to follow-up with Dr. Beaver's office due to travel distance.  She has not had a repeat abdominal aneurysm screen since her last in 2018.  Echocardiogram done last year revealed thoracic aortic aneurysm as well.  We discussed the large contribution of smoking to her issues.  Blood pressures been well controlled.  She takes her medications as directed.      Past Medical History:   Diagnosis Date   • AAA (abdominal aortic aneurysm) (HCC)    • CAD (coronary artery disease) 10/4/2012    2 stents [place] 2009    • Chronic insomnia 5/23/2016   • Depression 9/26/2013   • Diverticula of colon    • Hyperlipidemia 10/4/2012   • Hypertension    • Hypothyroid 10/17/2013   • Sleep apnea 5/22/2018   • Thyroid disease    • TIA (transient ischemic attack) 10/4/2012    2003      Past Surgical History:   Procedure Laterality Date   • STENT PLACEMENT  2008    Dr Can in [place]   • OTHER Left     foot   • OTHER      throat polyps removed (non cancerous)      Family History   Problem Relation Age of Onset   • Heart Disease Mother    • Heart Attack Mother    • Heart Disease Father    • Heart Attack Father    • Cancer Maternal Grandmother         stomach   • Diabetes Maternal Grandmother    • Cancer Maternal Grandfather         brain   • Stroke Paternal Grandmother      Social History     Socioeconomic History   • Marital status:      Spouse name: Not on file   • Number of children: Not on file   • Years of education: Not on file   • Highest education level: Not on file   Occupational History   • Not on file   Social Needs   •  Financial resource strain: Not on file   • Food insecurity:     Worry: Not on file     Inability: Not on file   • Transportation needs:     Medical: Not on file     Non-medical: Not on file   Tobacco Use   • Smoking status: Current Every Day Smoker     Packs/day: 0.25     Years: 40.00     Pack years: 10.00     Types: Cigarettes   • Smokeless tobacco: Never Used   • Tobacco comment: 3-4cigarettes daily   Substance and Sexual Activity   • Alcohol use: No   • Drug use: No   • Sexual activity: Never     Partners: Male   Lifestyle   • Physical activity:     Days per week: Not on file     Minutes per session: Not on file   • Stress: Not on file   Relationships   • Social connections:     Talks on phone: Not on file     Gets together: Not on file     Attends Jew service: Not on file     Active member of club or organization: Not on file     Attends meetings of clubs or organizations: Not on file     Relationship status: Not on file   • Intimate partner violence:     Fear of current or ex partner: Not on file     Emotionally abused: Not on file     Physically abused: Not on file     Forced sexual activity: Not on file   Other Topics Concern   • Not on file   Social History Narrative    On disability      Allergies   Allergen Reactions   • Pcn [Penicillins]      Outpatient Encounter Medications as of 11/14/2019   Medication Sig Dispense Refill   • traZODone (DESYREL) 150 MG Tab TAKE ONE TABLET BY MOUTH TWICE A  Tab 0   • spironolactone (ALDACTONE) 50 MG Tab TAKE 1 TAB BY MOUTH EVERY DAY. TAKE ONE TABLET BY MOUTH DAILY 90 Tab 0   • atorvastatin (LIPITOR) 80 MG tablet Take 1 Tab by mouth every day. Needs to be seen for further refills. Thank you 90 Tab 0   • carvedilol (COREG) 6.25 MG Tab TAKE 1 TABLET BY MOUTH DAILY 90 Tab 3   • losartan (COZAAR) 25 MG Tab Take 1 Tab by mouth every day. (Patient taking differently: Take 50 mg by mouth every day.) 30 Tab 5   • levothyroxine (SYNTHROID) 88 MCG Tab Take 1 Tab by  "mouth Every morning on an empty stomach. 90 Tab 3   • escitalopram (LEXAPRO) 20 MG tablet TAKE 1 TABLET BY MOUTH EVERY DAY 90 Tab 1   • aspirin (ASA) 325 MG TABS Take 325 mg by mouth every day.     • montelukast (SINGULAIR) 10 MG Tab Take 1 Tab by mouth every day. (Patient not taking: Reported on 11/14/2019) 30 Tab 2     No facility-administered encounter medications on file as of 11/14/2019.      Review of Systems   All other systems reviewed and are negative.       Objective:   /54 (BP Location: Left arm, Patient Position: Sitting, BP Cuff Size: Adult)   Pulse 60   Ht 1.702 m (5' 7\")   Wt 60.8 kg (134 lb)   SpO2 95%   BMI 20.99 kg/m²     Physical Exam   Constitutional: She is oriented to person, place, and time. She appears well-developed and well-nourished. No distress.   Poor dentition   HENT:   Head: Normocephalic and atraumatic.   Right Ear: External ear normal.   Left Ear: External ear normal.   Mouth/Throat: No oropharyngeal exudate.   Eyes: Pupils are equal, round, and reactive to light. Conjunctivae and EOM are normal. Right eye exhibits no discharge. Left eye exhibits no discharge. No scleral icterus.   Neck: Normal range of motion. Neck supple. No JVD present. No tracheal deviation present. No thyromegaly present.   Cardiovascular: Normal rate, regular rhythm, S1 normal, S2 normal, normal heart sounds and intact distal pulses. PMI is not displaced. Exam reveals no gallop, no S3, no S4 and no friction rub.   No murmur heard.  Pulses:       Carotid pulses are 2+ on the right side and 2+ on the left side.       Radial pulses are 2+ on the right side and 2+ on the left side.        Popliteal pulses are 2+ on the right side and 2+ on the left side.        Dorsalis pedis pulses are 2+ on the right side and 2+ on the left side.        Posterior tibial pulses are 2+ on the right side and 2+ on the left side.   Pulmonary/Chest: Effort normal and breath sounds normal. No respiratory distress. She has no " wheezes. She has no rales. She exhibits no tenderness.   Abdominal: Soft. Bowel sounds are normal. She exhibits no distension. There is no tenderness.   Musculoskeletal: Normal range of motion.         General: No tenderness or edema.   Neurological: She is alert and oriented to person, place, and time. No cranial nerve deficit (Cranial nerves II through XII grossly intact).   Skin: Skin is warm and dry. No rash noted. She is not diaphoretic. No erythema.   Psychiatric: She has a normal mood and affect. Her behavior is normal. Judgment and thought content normal.   Vitals reviewed.    LABS:  Lab Results   Component Value Date/Time    CHOLSTRLTOT 113 05/07/2018 09:25 AM    LDL 61 05/07/2018 09:25 AM    HDL 38 (A) 05/07/2018 09:25 AM    TRIGLYCERIDE 69 05/07/2018 09:25 AM       Lab Results   Component Value Date/Time    WBC 3.8 (L) 09/13/2019 09:59 AM    RBC 3.97 (L) 09/13/2019 09:59 AM    HEMOGLOBIN 13.0 09/13/2019 09:59 AM    HEMATOCRIT 39.6 09/13/2019 09:59 AM    MCV 99.7 (H) 09/13/2019 09:59 AM    NEUTSPOLYS 66.30 09/13/2019 09:59 AM    LYMPHOCYTES 22.60 09/13/2019 09:59 AM    MONOCYTES 8.20 09/13/2019 09:59 AM    EOSINOPHILS 2.40 09/13/2019 09:59 AM    BASOPHILS 0.50 09/13/2019 09:59 AM     Lab Results   Component Value Date/Time    SODIUM 138 09/13/2019 09:59 AM    POTASSIUM 3.9 09/13/2019 09:59 AM    CHLORIDE 107 09/13/2019 09:59 AM    CO2 23 09/13/2019 09:59 AM    GLUCOSE 95 09/13/2019 09:59 AM    BUN 19 09/13/2019 09:59 AM    CREATININE 1.08 09/13/2019 09:59 AM     Lab Results   Component Value Date    HBA1C 5.6 05/07/2018      Lab Results   Component Value Date/Time    ALKPHOSPHAT 84 09/13/2019 09:59 AM    ASTSGOT 53 (H) 09/13/2019 09:59 AM    ALTSGPT 27 09/13/2019 09:59 AM    TBILIRUBIN 0.6 09/13/2019 09:59 AM      Lab Results   Component Value Date/Time    BNPBTYPENAT 37 05/07/2018 09:25 AM      No results found for: TSH  Lab Results   Component Value Date/Time    PROTHROMBTM 12.6 06/17/2016 02:26 PM     INR 0.94 06/17/2016 02:26 PM          Assessment:     1. Abdominal aortic aneurysm (AAA) without rupture (HCC)  REFERRAL TO VASCULAR SURGERY    EC-ECHOCARDIOGRAM COMPLETE W/O CONT    US-ABDOMINAL AORTA W/O DOPPLER   2. Thoracic aortic aneurysm without rupture (HCC)  EC-ECHOCARDIOGRAM COMPLETE W/O CONT    US-ABDOMINAL AORTA W/O DOPPLER   3. Essential hypertension     4. Tobacco abuse     5. Mixed hyperlipidemia         Medical Decision Making:  Today's Assessment / Status / Plan:     She is doing well from a cardiac perspective without any cardiovascular symptoms.  Moderately active.  Continues to smoke.  Echocardiogram showed thoracic aorta is dilat  She has not following up with vascular surgery due to distance travel and I recommend referral to a more local vascular surgeon that is easier for her to come to regularly.  I will order her abdominal ultrasound and an echocardiogram to screen her aorta again and then will handoff care of her abdominal aneurysm to her new vascular surgeon.      1.  Smoking cessation  2.  Echocardiogram  3.  Abdominal aortic aneurysm ultrasound  4.  Continue lipid therapy  5.  Continue ACE inhibitor and blood pressure management  6.  Referral to vascular surgery to establish with a new provider    [id number]

## 2019-11-24 DIAGNOSIS — E03.4 HYPOTHYROIDISM DUE TO ACQUIRED ATROPHY OF THYROID: ICD-10-CM

## 2019-11-25 RX ORDER — LEVOTHYROXINE SODIUM 0.1 MG/1
TABLET ORAL
OUTPATIENT
Start: 2019-11-25

## 2019-11-29 ENCOUNTER — HOSPITAL ENCOUNTER (OUTPATIENT)
Dept: CARDIOLOGY | Facility: MEDICAL CENTER | Age: 74
End: 2019-11-29
Attending: INTERNAL MEDICINE
Payer: MEDICARE

## 2019-11-29 DIAGNOSIS — I71.40 ABDOMINAL AORTIC ANEURYSM (AAA) WITHOUT RUPTURE (HCC): ICD-10-CM

## 2019-11-29 DIAGNOSIS — I71.20 THORACIC AORTIC ANEURYSM WITHOUT RUPTURE (HCC): ICD-10-CM

## 2019-11-29 PROCEDURE — 93306 TTE W/DOPPLER COMPLETE: CPT

## 2019-12-02 ENCOUNTER — HOSPITAL ENCOUNTER (OUTPATIENT)
Dept: RADIOLOGY | Facility: MEDICAL CENTER | Age: 74
End: 2019-12-02
Attending: INTERNAL MEDICINE
Payer: MEDICARE

## 2019-12-02 DIAGNOSIS — I71.20 THORACIC AORTIC ANEURYSM WITHOUT RUPTURE (HCC): ICD-10-CM

## 2019-12-02 DIAGNOSIS — I71.40 ABDOMINAL AORTIC ANEURYSM (AAA) WITHOUT RUPTURE (HCC): ICD-10-CM

## 2019-12-02 DIAGNOSIS — E78.5 HYPERLIPIDEMIA, UNSPECIFIED HYPERLIPIDEMIA TYPE: ICD-10-CM

## 2019-12-02 LAB
LV EJECT FRACT  99904: 65
LV EJECT FRACT MOD 2C 99903: 62.64
LV EJECT FRACT MOD 4C 99902: 68.37
LV EJECT FRACT MOD BP 99901: 66.28

## 2019-12-02 PROCEDURE — 76775 US EXAM ABDO BACK WALL LIM: CPT

## 2019-12-02 PROCEDURE — 93306 TTE W/DOPPLER COMPLETE: CPT | Mod: 26 | Performed by: INTERNAL MEDICINE

## 2019-12-03 RX ORDER — ATORVASTATIN CALCIUM 80 MG/1
80 TABLET, FILM COATED ORAL DAILY
Qty: 90 TAB | Refills: 3 | Status: SHIPPED | OUTPATIENT
Start: 2019-12-03 | End: 2020-12-03 | Stop reason: SDUPTHER

## 2019-12-06 ENCOUNTER — TELEPHONE (OUTPATIENT)
Dept: CARDIOLOGY | Facility: MEDICAL CENTER | Age: 74
End: 2019-12-06

## 2019-12-06 NOTE — TELEPHONE ENCOUNTER
IMPRESSION from 12/2 Ultrasound:     1.  Infrarenal abdominal aortic aneurysm measuring 5 x 5.1 cm with significant mural thrombus again noted. Previously aneurysm measured 4.7 x 4.7 cm on prior CT. This could represent slight interval enlargement versus differences in technique.  2.  Atherosclerosis.    To Dr. CRUZ to advise.

## 2019-12-12 ENCOUNTER — HOSPITAL ENCOUNTER (OUTPATIENT)
Dept: LAB | Facility: MEDICAL CENTER | Age: 74
End: 2019-12-12
Attending: FAMILY MEDICINE
Payer: MEDICARE

## 2019-12-12 DIAGNOSIS — E03.4 HYPOTHYROIDISM DUE TO ACQUIRED ATROPHY OF THYROID: ICD-10-CM

## 2019-12-12 DIAGNOSIS — D69.6 THROMBOCYTOPENIA (HCC): ICD-10-CM

## 2019-12-12 DIAGNOSIS — R94.4 DECREASED GFR: ICD-10-CM

## 2019-12-12 PROCEDURE — 84443 ASSAY THYROID STIM HORMONE: CPT

## 2019-12-12 PROCEDURE — 85027 COMPLETE CBC AUTOMATED: CPT

## 2019-12-12 PROCEDURE — 84439 ASSAY OF FREE THYROXINE: CPT

## 2019-12-12 PROCEDURE — 36415 COLL VENOUS BLD VENIPUNCTURE: CPT

## 2019-12-12 PROCEDURE — 80048 BASIC METABOLIC PNL TOTAL CA: CPT

## 2019-12-12 NOTE — TELEPHONE ENCOUNTER
Andre Pulido M.D.  You 1 hour ago (2:21 PM)      Please let her know that her abdominal ultrasound showed progression of her aneurysm and she needs to follow-up with vascular surgery as referred soon.  She should present to the emergency department with any sudden abdominal or low back symptoms.     Thanks     TW         Attempted to call pt, no answer, LM for her to call back. [facility] [employer] message sent.

## 2019-12-13 LAB
ANION GAP SERPL CALC-SCNC: 4 MMOL/L (ref 0–11.9)
BUN SERPL-MCNC: 24 MG/DL (ref 8–22)
CALCIUM SERPL-MCNC: 9 MG/DL (ref 8.5–10.5)
CHLORIDE SERPL-SCNC: 109 MMOL/L (ref 96–112)
CO2 SERPL-SCNC: 26 MMOL/L (ref 20–33)
CREAT SERPL-MCNC: 1.02 MG/DL (ref 0.5–1.4)
ERYTHROCYTE [DISTWIDTH] IN BLOOD BY AUTOMATED COUNT: 49.8 FL (ref 35.9–50)
FASTING STATUS PATIENT QL REPORTED: NORMAL
GLUCOSE SERPL-MCNC: 83 MG/DL (ref 65–99)
HCT VFR BLD AUTO: 40.1 % (ref 37–47)
HGB BLD-MCNC: 13.2 G/DL (ref 12–16)
MCH RBC QN AUTO: 32.9 PG (ref 27–33)
MCHC RBC AUTO-ENTMCNC: 32.9 G/DL (ref 33.6–35)
MCV RBC AUTO: 100 FL (ref 81.4–97.8)
PLATELET # BLD AUTO: 125 K/UL (ref 164–446)
PMV BLD AUTO: 11.7 FL (ref 9–12.9)
POTASSIUM SERPL-SCNC: 4.2 MMOL/L (ref 3.6–5.5)
RBC # BLD AUTO: 4.01 M/UL (ref 4.2–5.4)
SODIUM SERPL-SCNC: 139 MMOL/L (ref 135–145)
T4 FREE SERPL-MCNC: 1.27 NG/DL (ref 0.53–1.43)
TSH SERPL DL<=0.005 MIU/L-ACNC: 0.69 UIU/ML (ref 0.38–5.33)
WBC # BLD AUTO: 4.9 K/UL (ref 4.8–10.8)

## 2019-12-18 RX ORDER — SPIRONOLACTONE 50 MG/1
50 TABLET, FILM COATED ORAL DAILY
Qty: 90 TAB | Refills: 1 | Status: SHIPPED | OUTPATIENT
Start: 2019-12-18 | End: 2020-04-16

## 2019-12-18 NOTE — TELEPHONE ENCOUNTER
Was the patient seen in the last year in this department? Yes    Does patient have an active prescription for medications requested? No     Received Request Via: Pharmacy    Pt met protocol?: Yes     Last OV 10/15/19

## 2019-12-18 NOTE — TELEPHONE ENCOUNTER
Refill X 6 months, sent to pharmacy.Pt. Seen in the last 6 months per protocol.   Lab Results   Component Value Date/Time    SODIUM 139 12/12/2019 11:40 AM    POTASSIUM 4.2 12/12/2019 11:40 AM    CHLORIDE 109 12/12/2019 11:40 AM    CO2 26 12/12/2019 11:40 AM    GLUCOSE 83 12/12/2019 11:40 AM    BUN 24 (H) 12/12/2019 11:40 AM    CREATININE 1.02 12/12/2019 11:40 AM

## 2019-12-31 ENCOUNTER — HOSPITAL ENCOUNTER (OUTPATIENT)
Dept: RADIOLOGY | Facility: MEDICAL CENTER | Age: 74
End: 2019-12-31
Attending: SURGERY
Payer: MEDICARE

## 2019-12-31 DIAGNOSIS — I71.9 HYALINE NECROSIS OF AORTA (HCC): ICD-10-CM

## 2019-12-31 PROCEDURE — 700117 HCHG RX CONTRAST REV CODE 255: Performed by: SURGERY

## 2019-12-31 PROCEDURE — 74174 CTA ABD&PLVS W/CONTRAST: CPT

## 2019-12-31 RX ADMIN — IOHEXOL 100 ML: 350 INJECTION, SOLUTION INTRAVENOUS at 14:38

## 2020-01-24 DIAGNOSIS — F32.A DEPRESSION, UNSPECIFIED DEPRESSION TYPE: ICD-10-CM

## 2020-01-24 DIAGNOSIS — D69.6 THROMBOCYTOPENIA (HCC): ICD-10-CM

## 2020-01-24 RX ORDER — TRAZODONE HYDROCHLORIDE 150 MG/1
TABLET ORAL
Qty: 180 TAB | Refills: 2 | Status: SHIPPED | OUTPATIENT
Start: 2020-01-24 | End: 2020-12-03 | Stop reason: SDUPTHER

## 2020-02-21 RX ORDER — ESCITALOPRAM OXALATE 20 MG/1
TABLET ORAL
Qty: 90 TAB | Refills: 1 | Status: SHIPPED | OUTPATIENT
Start: 2020-02-21 | End: 2020-09-24

## 2020-03-02 NOTE — PROGRESS NOTES
"Chief Complaint   Patient presents with   • Abdominal Aortic Aneurysm       Subjective:   Evie Morillo is a 74 y.o. female patient of Dr. Andre Pulido who presents today for follow up regarding his CAD, HTN and abdominal aneurysm.    Patient was last seen by Dr. Pulido on 11/14/19. She had not her routine abdominal aneurysm screening completed due to travel distance. An abdominal US and echocardiogram was ordered. Patient was referred to vascular surgery to establish care for her abdominal aneurysm.    Other past medical history significant for smoking, depression, HLD, HTN and hypothyroidism.     February 24 by Dr. Lara at [facility].     Today patient states that she is here today to discuss her oxygen dropping at times while she was hospitalized after undergoing bilateral iliac stents and aortic aneurysm repair by Dr. Wood at [facility] at the end of last months. Patient reports that she was also told she had a \"gap\" in between heart beats. She does have bilateral groin tenderness and swelling. Patient did quit smoking approximately one month ago without the use of nicotine patch or prescription.     Past Medical History:   Diagnosis Date   • AAA (abdominal aortic aneurysm) (HCC)    • CAD (coronary artery disease) 10/4/2012    2 stents [place] 2009    • Chronic insomnia 5/23/2016   • Depression 9/26/2013   • Diverticula of colon    • Hyperlipidemia 10/4/2012   • Hypertension    • Hypothyroid 10/17/2013   • Sleep apnea 5/22/2018   • Thyroid disease    • TIA (transient ischemic attack) 10/4/2012    2003      Past Surgical History:   Procedure Laterality Date   • STENT PLACEMENT  2008    Dr Can in [place]   • OTHER Left     foot   • OTHER      throat polyps removed (non cancerous)      Family History   Problem Relation Age of Onset   • Heart Disease Mother    • Heart Attack Mother    • Heart Disease Father    • Heart Attack Father    • Cancer Maternal Grandmother         stomach   • Diabetes " Maternal Grandmother    • Cancer Maternal Grandfather         brain   • Stroke Paternal Grandmother      Social History     Socioeconomic History   • Marital status:      Spouse name: Not on file   • Number of children: Not on file   • Years of education: Not on file   • Highest education level: Not on file   Occupational History   • Not on file   Social Needs   • Financial resource strain: Not on file   • Food insecurity     Worry: Not on file     Inability: Not on file   • Transportation needs     Medical: Not on file     Non-medical: Not on file   Tobacco Use   • Smoking status: Current Every Day Smoker     Packs/day: 0.25     Years: 40.00     Pack years: 10.00     Types: Cigarettes   • Smokeless tobacco: Never Used   • Tobacco comment: 3-4cigarettes daily   Substance and Sexual Activity   • Alcohol use: No   • Drug use: No   • Sexual activity: Never     Partners: Male   Lifestyle   • Physical activity     Days per week: Not on file     Minutes per session: Not on file   • Stress: Not on file   Relationships   • Social connections     Talks on phone: Not on file     Gets together: Not on file     Attends Quaker service: Not on file     Active member of club or organization: Not on file     Attends meetings of clubs or organizations: Not on file     Relationship status: Not on file   • Intimate partner violence     Fear of current or ex partner: Not on file     Emotionally abused: Not on file     Physically abused: Not on file     Forced sexual activity: Not on file   Other Topics Concern   • Not on file   Social History Narrative    On disability      Allergies   Allergen Reactions   • Pcn [Penicillins]      Outpatient Encounter Medications as of 3/12/2020   Medication Sig Dispense Refill   • escitalopram (LEXAPRO) 20 MG tablet TAKE 1 TABLET BY MOUTH EVERY DAY 90 Tab 1   • traZODone (DESYREL) 150 MG Tab TAKE ONE TABLET BY MOUTH TWICE A  Tab 2   • spironolactone (ALDACTONE) 50 MG Tab Take 1 Tab by  "mouth every day. 90 Tab 1   • atorvastatin (LIPITOR) 80 MG tablet Take 1 Tab by mouth every day. 90 Tab 3   • losartan (COZAAR) 25 MG Tab Take 1 Tab by mouth every day. (Patient taking differently: Take 50 mg by mouth every day.) 30 Tab 5   • levothyroxine (SYNTHROID) 88 MCG Tab Take 1 Tab by mouth Every morning on an empty stomach. 90 Tab 3   • aspirin (ASA) 325 MG TABS Take 325 mg by mouth every day.     • [DISCONTINUED] HYDROcodone-acetaminophen (NORCO) 5-325 MG Tab per tablet      • [DISCONTINUED] losartan (COZAAR) 50 MG Tab      • [DISCONTINUED] carvedilol (COREG) 6.25 MG Tab TAKE 1 TABLET BY MOUTH DAILY (Patient not taking: Reported on 3/12/2020) 90 Tab 3   • [DISCONTINUED] montelukast (SINGULAIR) 10 MG Tab Take 1 Tab by mouth every day. (Patient not taking: Reported on 3/12/2020) 30 Tab 2     No facility-administered encounter medications on file as of 3/12/2020.      Review of Systems   Constitutional: Negative for malaise/fatigue and weight loss.   Respiratory: Negative for shortness of breath.    Cardiovascular: Negative for chest pain, palpitations, orthopnea, claudication, leg swelling and PND.   Musculoskeletal:        Bilateral groin tenderness and swelling   Neurological: Negative for dizziness and weakness.   All other systems reviewed and are negative.       Objective:   /60 (BP Location: Left arm, Patient Position: Sitting, BP Cuff Size: Adult)   Pulse 86   Ht 1.702 m (5' 7\")   Wt 63.9 kg (140 lb 12.8 oz)   SpO2 98%   BMI 22.05 kg/m²     Physical Exam   Constitutional: She is oriented to person, place, and time. She appears well-developed and well-nourished. No distress.   HENT:   Head: Normocephalic.   Eyes: EOM are normal.   Neck: No JVD present.   Cardiovascular: Normal rate, regular rhythm, normal heart sounds and intact distal pulses.   Pulmonary/Chest: Effort normal and breath sounds normal. No respiratory distress.   Abdominal: Soft. There is no abdominal tenderness. "   Musculoskeletal:         General: No edema.   Neurological: She is alert and oriented to person, place, and time.   Skin: Skin is warm and dry.   Bilateral groin surgical sites are intact with steri strips. Both groin sites have hematomas.    Psychiatric: She has a normal mood and affect. Her behavior is normal.       CTA Abdomen Pelvis W&W/O 12/31/19:  Aorta and vasculature: The abdominal aorta is tortuous. The proximal abdominal aorta measures approximately 2.4 cm anterior posteriorly. The abdominal aorta measures approximately 2.6 cm in width at the level of the renal arteries. The infrarenal abdominal aorta has a maximum AP diameter of approximately 5.0 cm in maximum transverse diameter of approximately 5.1 cm which has increased since previous CT scan. There is eccentric mural thrombus. No evidence of leak.    IMPRESSION:  Interval increase in size in infrarenal abdominal aortic aneurysm compared with previous CT scan with a maximum diameter of 5.1 cm as noted on recent ultrasound.     Diverticulosis without evidence of diverticulitis.    Echocardiogram 11/29/19:  CONCLUSIONS  Compared to the report of the study done 05/23/18 -the aortic insufficiency is slightly more prominent and otherwise there has been no significant change. Normal left ventricular size, wall thickness, and systolic function. Left ventricular ejection fraction is visually estimated to be 65%. Mild to moderate aortic insufficiency. Ascending aorta is dilated with a diameter is 4.4 cm.    Echocardiogram 5/24/18:   CONCLUSIONS  No prior study is available for comparison.   The left ventricle was normal in size and thickness.  The left ventricle was normal in size and thickness.  Left ventricular ejection fraction is visually estimated to be 60%.  Mild aortic insufficiency.  Ascending aorta is dilated with a diameter of 4.4 cm.    Assessment:     1. Essential hypertension     2. Abdominal aortic aneurysm (AAA) without rupture (HCC)     3.  Mixed hyperlipidemia     4. Tobacco abuse     5. Ascending aorta dilatation (HCC)     6. Coronary artery disease involving native coronary artery of native heart without angina pectoris         Medical Decision Making:  Today's Assessment / Status / Plan:     Report of O2 desaturation and abnormal rhythm: Walked patient around the office with pulse oximeter, did not drop below 90% (averaged 92-94%). Discussed that it could have been intermittent poor contact of the pulse ox probe on her finger. I did mention that she can buy a pulse oximeter for home to continue to monitor. I will request the records from [facility] to see if any arrhythmia was documented (rhythm regular upon auscultation today).     Abdominal Aortic Aneurysm: S/P Repair by Dr. Wood in February. Bilateral groins sites have a hematoma, patient was advised to call Dr. Wood's office ASAP.     Dilated Ascending Aorta (Stable at 4.4 cm): Continue routine monitoring, due in 6 months as it.    CAD: Stable. Denies angina or dyspnea.     HLD (Stable, LDL 61): Continue Atorvastatin 80 mg daily.     HTN: (Well controlled): Continue losartan 25 mg daily, Aldactone 50 mg daily and Coreg 6.25 mg BID.    Tobacco Use: Patient congratulated on quitting smoking and encouraged to continue.     Patient will follow up with Dr. Andre Pulido as previously scheduled below or earlier if needed. Encouraged patient to contact office should any questions or concerns arise in the meant time. Patient understands and agrees with the plan of care.     Future Appointments   Date Time Provider Department Center   3/17/2020  9:15 AM [facility] EXAM 8 [facility]   4/16/2020 11:00 AM Andre Pulido M.D. [facility] None     Collaborating Provider: Dr. Jersey Alba       Please note that this dictation was created using voice recognition software. I have made every reasonable attempt to correct obvious errors, but I expect that there are errors of grammar and possibly  content I did not discover before finalizing the note.

## 2020-03-12 ENCOUNTER — OFFICE VISIT (OUTPATIENT)
Dept: CARDIOLOGY | Facility: MEDICAL CENTER | Age: 75
End: 2020-03-12
Payer: MEDICARE

## 2020-03-12 VITALS
HEIGHT: 67 IN | OXYGEN SATURATION: 98 % | WEIGHT: 140.8 LBS | BODY MASS INDEX: 22.1 KG/M2 | DIASTOLIC BLOOD PRESSURE: 60 MMHG | HEART RATE: 86 BPM | SYSTOLIC BLOOD PRESSURE: 106 MMHG

## 2020-03-12 DIAGNOSIS — I77.810 ASCENDING AORTA DILATATION (HCC): ICD-10-CM

## 2020-03-12 DIAGNOSIS — E78.2 MIXED HYPERLIPIDEMIA: ICD-10-CM

## 2020-03-12 DIAGNOSIS — I25.10 CORONARY ARTERY DISEASE INVOLVING NATIVE CORONARY ARTERY OF NATIVE HEART WITHOUT ANGINA PECTORIS: ICD-10-CM

## 2020-03-12 DIAGNOSIS — Z72.0 TOBACCO ABUSE: ICD-10-CM

## 2020-03-12 DIAGNOSIS — I10 ESSENTIAL HYPERTENSION: ICD-10-CM

## 2020-03-12 DIAGNOSIS — I71.40 ABDOMINAL AORTIC ANEURYSM (AAA) WITHOUT RUPTURE (HCC): ICD-10-CM

## 2020-03-12 PROBLEM — D69.6 THROMBOCYTOPENIA (HCC): Status: RESOLVED | Noted: 2020-01-24 | Resolved: 2020-03-12

## 2020-03-12 PROBLEM — R55 SYNCOPE: Status: RESOLVED | Noted: 2018-08-13 | Resolved: 2020-03-12

## 2020-03-12 PROBLEM — M43.6 NECK STIFFNESS: Status: RESOLVED | Noted: 2019-04-15 | Resolved: 2020-03-12

## 2020-03-12 PROBLEM — Z91.09 ENVIRONMENTAL ALLERGIES: Status: RESOLVED | Noted: 2018-05-22 | Resolved: 2020-03-12

## 2020-03-12 PROBLEM — M85.89 OSTEOPENIA OF MULTIPLE SITES: Status: RESOLVED | Noted: 2019-10-15 | Resolved: 2020-03-12

## 2020-03-12 PROBLEM — M54.50 LOW BACK PAIN: Status: RESOLVED | Noted: 2018-05-22 | Resolved: 2020-03-12

## 2020-03-12 PROBLEM — R63.4 WEIGHT LOSS: Status: RESOLVED | Noted: 2019-04-15 | Resolved: 2020-03-12

## 2020-03-12 PROCEDURE — 99214 OFFICE O/P EST MOD 30 MIN: CPT | Performed by: NURSE PRACTITIONER

## 2020-03-12 RX ORDER — HYDROCODONE BITARTRATE AND ACETAMINOPHEN 5; 325 MG/1; MG/1
TABLET ORAL
COMMUNITY
Start: 2020-02-26 | End: 2020-03-12

## 2020-03-12 RX ORDER — LOSARTAN POTASSIUM 50 MG/1
TABLET ORAL
COMMUNITY
Start: 2019-12-20 | End: 2020-03-12

## 2020-03-12 ASSESSMENT — ENCOUNTER SYMPTOMS
CLAUDICATION: 0
ORTHOPNEA: 0
PALPITATIONS: 0
SHORTNESS OF BREATH: 0
DIZZINESS: 0
WEAKNESS: 0
PND: 0
WEIGHT LOSS: 0

## 2020-03-12 ASSESSMENT — FIBROSIS 4 INDEX: FIB4 SCORE: 6.2

## 2020-03-17 ENCOUNTER — HOSPITAL ENCOUNTER (OUTPATIENT)
Dept: RADIOLOGY | Facility: MEDICAL CENTER | Age: 75
End: 2020-03-17
Attending: SURGERY
Payer: MEDICARE

## 2020-03-17 DIAGNOSIS — I71.40 ABDOMINAL AORTIC ANEURYSM WITHOUT RUPTURE (HCC): ICD-10-CM

## 2020-03-17 PROCEDURE — 93978 VASCULAR STUDY: CPT

## 2020-03-18 ENCOUNTER — TELEPHONE (OUTPATIENT)
Dept: CARDIOLOGY | Facility: MEDICAL CENTER | Age: 75
End: 2020-03-18

## 2020-03-18 NOTE — TELEPHONE ENCOUNTER
CLIFTON Hannah R.N.             When you have a moment would you please obtain medical record from [facility] for patients hospitalization in February?     Thank you!!     Gustavo         Medical records request faxed to [facility] at [phone], received receipt for confirmation.

## 2020-04-07 DIAGNOSIS — E03.4 HYPOTHYROIDISM DUE TO ACQUIRED ATROPHY OF THYROID: ICD-10-CM

## 2020-04-08 RX ORDER — LEVOTHYROXINE SODIUM 0.1 MG/1
TABLET ORAL
Qty: 90 TAB | Refills: 2 | OUTPATIENT
Start: 2020-04-08

## 2020-04-08 RX ORDER — LEVOTHYROXINE SODIUM 88 UG/1
88 TABLET ORAL
Qty: 90 TAB | Refills: 3 | Status: SHIPPED | OUTPATIENT
Start: 2020-04-08 | End: 2020-12-03 | Stop reason: SDUPTHER

## 2020-04-16 ENCOUNTER — TELEPHONE (OUTPATIENT)
Dept: CARDIOLOGY | Facility: MEDICAL CENTER | Age: 75
End: 2020-04-16

## 2020-04-16 RX ORDER — SPIRONOLACTONE 50 MG/1
TABLET, FILM COATED ORAL
Qty: 100 TAB | Refills: 1 | Status: SHIPPED | OUTPATIENT
Start: 2020-04-16 | End: 2020-11-12 | Stop reason: SDUPTHER

## 2020-04-16 NOTE — TELEPHONE ENCOUNTER
Was the patient seen in the last year in this department? Yes    Does patient have an active prescription for medications requested? No     Received Request Via: Pharmacy      Pt met protocol?: Yes, last ov 10/19  Last labs 12/19  BP Readings from Last 1 Encounters:   03/12/20 106/60

## 2020-04-16 NOTE — TELEPHONE ENCOUNTER
JS    Patient would like to refill their spironolactone (ALDACTONE) 50 MG Tab to the [facility] in [place]. They usually get it prescribed by their PCP Gianna Mahoney MD but they are not answering their calls and they have been out of the medication for 2 days. Will provider refill Pt rx? If you have any questions you can call Evie [phone]    Thank you,  Kori TAYLOR

## 2020-06-24 ENCOUNTER — OFFICE VISIT (OUTPATIENT)
Dept: CARDIOLOGY | Facility: MEDICAL CENTER | Age: 75
End: 2020-06-24
Payer: MEDICARE

## 2020-06-24 VITALS
HEART RATE: 88 BPM | OXYGEN SATURATION: 91 % | BODY MASS INDEX: 23.42 KG/M2 | SYSTOLIC BLOOD PRESSURE: 82 MMHG | DIASTOLIC BLOOD PRESSURE: 50 MMHG | HEIGHT: 67 IN | WEIGHT: 149.2 LBS

## 2020-06-24 DIAGNOSIS — Z86.79 S/P AAA REPAIR: ICD-10-CM

## 2020-06-24 DIAGNOSIS — E78.2 MIXED HYPERLIPIDEMIA: ICD-10-CM

## 2020-06-24 DIAGNOSIS — I10 ESSENTIAL HYPERTENSION: ICD-10-CM

## 2020-06-24 DIAGNOSIS — I35.1 NONRHEUMATIC AORTIC VALVE INSUFFICIENCY: ICD-10-CM

## 2020-06-24 DIAGNOSIS — Z98.890 S/P AAA REPAIR: ICD-10-CM

## 2020-06-24 DIAGNOSIS — R06.09 DOE (DYSPNEA ON EXERTION): ICD-10-CM

## 2020-06-24 PROCEDURE — 99214 OFFICE O/P EST MOD 30 MIN: CPT | Performed by: INTERNAL MEDICINE

## 2020-06-24 RX ORDER — LOSARTAN POTASSIUM 100 MG/1
TABLET ORAL
COMMUNITY
Start: 2020-06-04 | End: 2020-09-24

## 2020-06-24 ASSESSMENT — FIBROSIS 4 INDEX: FIB4 SCORE: 6.28

## 2020-06-24 NOTE — PROGRESS NOTES
Chief Complaint   Patient presents with   • Coronary Artery Disease       Subjective:   Evie Morillo is a 75 y.o. female who presents today for follow-up of coronary artery disease hypertension as well as AAA.      Since last visit she underwent abdominal aortic aneurysm repair with Dr. Allyssa Agudelo.  This is complicated by bilateral groin hematomas she now notes some numbness of these areas but the hematomas have resolved.  She is taking her medical therapy.  She notes she quit smoking since February which she was congratulated on but has gained 15 pounds and now feels somewhat short of breath when active.  She has mild to moderate aortic insufficiency which is chronic.      Past Medical History:   Diagnosis Date   • AAA (abdominal aortic aneurysm) (HCC)    • CAD (coronary artery disease) 10/4/2012    2 stents [place] 2009    • Chronic insomnia 5/23/2016   • Depression 9/26/2013   • Diverticula of colon    • Hyperlipidemia 10/4/2012   • Hypertension    • Hypothyroid 10/17/2013   • Sleep apnea 5/22/2018   • Thyroid disease    • TIA (transient ischemic attack) 10/4/2012    2003      Past Surgical History:   Procedure Laterality Date   • STENT PLACEMENT  2008    Dr Can in [place]   • OTHER Left     foot   • OTHER      throat polyps removed (non cancerous)      Family History   Problem Relation Age of Onset   • Heart Disease Mother    • Heart Attack Mother    • Heart Disease Father    • Heart Attack Father    • Cancer Maternal Grandmother         stomach   • Diabetes Maternal Grandmother    • Cancer Maternal Grandfather         brain   • Stroke Paternal Grandmother      Social History     Socioeconomic History   • Marital status:      Spouse name: Not on file   • Number of children: Not on file   • Years of education: Not on file   • Highest education level: Not on file   Occupational History   • Not on file   Social Needs   • Financial resource strain: Not on file   • Food insecurity     Worry: Not  on file     Inability: Not on file   • Transportation needs     Medical: Not on file     Non-medical: Not on file   Tobacco Use   • Smoking status: Former Smoker     Packs/day: 0.25     Years: 40.00     Pack years: 10.00     Types: Cigarettes   • Smokeless tobacco: Never Used   • Tobacco comment: 3-4cigarettes daily   Substance and Sexual Activity   • Alcohol use: No   • Drug use: No   • Sexual activity: Never     Partners: Male   Lifestyle   • Physical activity     Days per week: Not on file     Minutes per session: Not on file   • Stress: Not on file   Relationships   • Social connections     Talks on phone: Not on file     Gets together: Not on file     Attends Baptism service: Not on file     Active member of club or organization: Not on file     Attends meetings of clubs or organizations: Not on file     Relationship status: Not on file   • Intimate partner violence     Fear of current or ex partner: Not on file     Emotionally abused: Not on file     Physically abused: Not on file     Forced sexual activity: Not on file   Other Topics Concern   • Not on file   Social History Narrative    On disability      Allergies   Allergen Reactions   • Pcn [Penicillins]      Outpatient Encounter Medications as of 6/24/2020   Medication Sig Dispense Refill   • spironolactone (ALDACTONE) 50 MG Tab TAKE ONE TABLET BY MOUTH DAILY 100 Tab 1   • levothyroxine (SYNTHROID) 88 MCG Tab Take 1 Tab by mouth Every morning on an empty stomach. 90 Tab 3   • escitalopram (LEXAPRO) 20 MG tablet TAKE 1 TABLET BY MOUTH EVERY DAY 90 Tab 1   • traZODone (DESYREL) 150 MG Tab TAKE ONE TABLET BY MOUTH TWICE A DAY (Patient taking differently: Take 150 mg by mouth every day.) 180 Tab 2   • atorvastatin (LIPITOR) 80 MG tablet Take 1 Tab by mouth every day. 90 Tab 3   • losartan (COZAAR) 25 MG Tab Take 1 Tab by mouth every day. (Patient taking differently: Take 50 mg by mouth every day.) 30 Tab 5   • aspirin (ASA) 325 MG TABS Take 325 mg by mouth  "every day.     • losartan (COZAAR) 100 MG Tab        No facility-administered encounter medications on file as of 6/24/2020.      Review of Systems   All other systems reviewed and are negative.       Objective:   BP (!) 82/50 (BP Location: Left arm, Patient Position: Sitting, BP Cuff Size: Adult)   Pulse 88   Ht 1.702 m (5' 7\")   Wt 67.7 kg (149 lb 3.2 oz)   SpO2 91%   BMI 23.37 kg/m²     Physical Exam   Constitutional: She is oriented to person, place, and time. She appears well-developed and well-nourished. No distress.   Poor dentition   HENT:   Head: Normocephalic and atraumatic.   Right Ear: External ear normal.   Left Ear: External ear normal.   Mouth/Throat: No oropharyngeal exudate.   Eyes: Pupils are equal, round, and reactive to light. Conjunctivae and EOM are normal. Right eye exhibits no discharge. Left eye exhibits no discharge. No scleral icterus.   Neck: Normal range of motion. Neck supple. No JVD present. No tracheal deviation present. No thyromegaly present.   Cardiovascular: Normal rate, regular rhythm, S1 normal, S2 normal, normal heart sounds and intact distal pulses. PMI is not displaced. Exam reveals no gallop, no S3, no S4 and no friction rub.   No murmur heard.  Pulses:       Carotid pulses are 2+ on the right side and 2+ on the left side.       Radial pulses are 2+ on the right side and 2+ on the left side.        Popliteal pulses are 2+ on the right side and 2+ on the left side.        Dorsalis pedis pulses are 2+ on the right side and 2+ on the left side.        Posterior tibial pulses are 2+ on the right side and 2+ on the left side.   Pulmonary/Chest: Effort normal and breath sounds normal. No respiratory distress. She has no wheezes. She has no rales. She exhibits no tenderness.   Abdominal: Soft. Bowel sounds are normal. She exhibits no distension. There is no abdominal tenderness.   Musculoskeletal: Normal range of motion.         General: No tenderness or edema.   Neurological: " She is alert and oriented to person, place, and time. No cranial nerve deficit (Cranial nerves II through XII grossly intact).   Skin: Skin is warm and dry. No rash noted. She is not diaphoretic. No erythema.   Psychiatric: She has a normal mood and affect. Her behavior is normal. Judgment and thought content normal.   Vitals reviewed.    LABS:  Lab Results   Component Value Date/Time    CHOLSTRLTOT 113 05/07/2018 09:25 AM    LDL 61 05/07/2018 09:25 AM    HDL 38 (A) 05/07/2018 09:25 AM    TRIGLYCERIDE 69 05/07/2018 09:25 AM       Lab Results   Component Value Date/Time    WBC 4.9 12/12/2019 11:40 AM    RBC 4.01 (L) 12/12/2019 11:40 AM    HEMOGLOBIN 13.2 12/12/2019 11:40 AM    HEMATOCRIT 40.1 12/12/2019 11:40 AM    .0 (H) 12/12/2019 11:40 AM    NEUTSPOLYS 66.30 09/13/2019 09:59 AM    LYMPHOCYTES 22.60 09/13/2019 09:59 AM    MONOCYTES 8.20 09/13/2019 09:59 AM    EOSINOPHILS 2.40 09/13/2019 09:59 AM    BASOPHILS 0.50 09/13/2019 09:59 AM     Lab Results   Component Value Date/Time    SODIUM 139 12/12/2019 11:40 AM    POTASSIUM 4.2 12/12/2019 11:40 AM    CHLORIDE 109 12/12/2019 11:40 AM    CO2 26 12/12/2019 11:40 AM    GLUCOSE 83 12/12/2019 11:40 AM    BUN 24 (H) 12/12/2019 11:40 AM    CREATININE 1.02 12/12/2019 11:40 AM         Lab Results   Component Value Date/Time    ALKPHOSPHAT 84 09/13/2019 09:59 AM    ASTSGOT 53 (H) 09/13/2019 09:59 AM    ALTSGPT 27 09/13/2019 09:59 AM    TBILIRUBIN 0.6 09/13/2019 09:59 AM      Lab Results   Component Value Date/Time    BNPBTYPENAT 37 05/07/2018 09:25 AM      No results found for: TSH  Lab Results   Component Value Date/Time    PROTHROMBTM 12.6 06/17/2016 02:26 PM    INR 0.94 06/17/2016 02:26 PM          Assessment:     1. Nonrheumatic aortic valve insufficiency  EC-ECHOCARDIOGRAM COMPLETE W/O CONT   2. S/P AAA repair     3. Essential hypertension     4. Mixed hyperlipidemia     5. OSEGUERA (dyspnea on exertion)         Medical Decision Making:  Today's Assessment / Status /  Plan:     She is good smoking.  This is excellent.  Her dyspnea exertion is most likely related to her 15 pound weight gain and reduction in her usual activities due to COVID-19.  Nonetheless I think checking to make sure she did not have advancement of her aortic insufficiency would be reasonable.  She should continue to abstain from smoking.  Take her other medications as directed.  No CAD symptoms.  She does have a history of PCI.  LDL is at goal less than 70.    Follow-up yearly

## 2020-07-01 ENCOUNTER — HOSPITAL ENCOUNTER (OUTPATIENT)
Dept: CARDIOLOGY | Facility: MEDICAL CENTER | Age: 75
End: 2020-07-01
Attending: INTERNAL MEDICINE
Payer: MEDICARE

## 2020-07-01 DIAGNOSIS — I35.1 NONRHEUMATIC AORTIC VALVE INSUFFICIENCY: ICD-10-CM

## 2020-07-01 PROCEDURE — 93306 TTE W/DOPPLER COMPLETE: CPT

## 2020-07-02 LAB
LV EJECT FRACT  99904: 70
LV EJECT FRACT MOD 2C 99903: 64.46
LV EJECT FRACT MOD 4C 99902: 70.08
LV EJECT FRACT MOD BP 99901: 69.26

## 2020-07-02 PROCEDURE — 93306 TTE W/DOPPLER COMPLETE: CPT | Mod: 26 | Performed by: INTERNAL MEDICINE

## 2020-09-17 ENCOUNTER — OFFICE VISIT (OUTPATIENT)
Dept: MEDICAL GROUP | Facility: PHYSICIAN GROUP | Age: 75
End: 2020-09-17
Payer: MEDICARE

## 2020-09-17 VITALS
WEIGHT: 159.6 LBS | HEART RATE: 65 BPM | DIASTOLIC BLOOD PRESSURE: 68 MMHG | HEIGHT: 67 IN | BODY MASS INDEX: 25.05 KG/M2 | TEMPERATURE: 98.3 F | SYSTOLIC BLOOD PRESSURE: 120 MMHG | OXYGEN SATURATION: 95 %

## 2020-09-17 DIAGNOSIS — Z87.891 FORMER SMOKER: ICD-10-CM

## 2020-09-17 DIAGNOSIS — Z86.79 S/P AAA REPAIR: ICD-10-CM

## 2020-09-17 DIAGNOSIS — I10 ESSENTIAL HYPERTENSION: ICD-10-CM

## 2020-09-17 DIAGNOSIS — E78.2 MIXED HYPERLIPIDEMIA: ICD-10-CM

## 2020-09-17 DIAGNOSIS — K21.9 GASTROESOPHAGEAL REFLUX DISEASE, ESOPHAGITIS PRESENCE NOT SPECIFIED: ICD-10-CM

## 2020-09-17 DIAGNOSIS — Z23 NEED FOR VACCINATION: ICD-10-CM

## 2020-09-17 DIAGNOSIS — Z98.890 S/P AAA REPAIR: ICD-10-CM

## 2020-09-17 DIAGNOSIS — E66.3 OVERWEIGHT: ICD-10-CM

## 2020-09-17 DIAGNOSIS — I25.10 CORONARY ARTERY DISEASE INVOLVING NATIVE CORONARY ARTERY OF NATIVE HEART WITHOUT ANGINA PECTORIS: ICD-10-CM

## 2020-09-17 DIAGNOSIS — E03.4 HYPOTHYROIDISM DUE TO ACQUIRED ATROPHY OF THYROID: ICD-10-CM

## 2020-09-17 PROCEDURE — 99214 OFFICE O/P EST MOD 30 MIN: CPT | Mod: 25 | Performed by: INTERNAL MEDICINE

## 2020-09-17 PROCEDURE — 90750 HZV VACC RECOMBINANT IM: CPT | Performed by: INTERNAL MEDICINE

## 2020-09-17 PROCEDURE — 90471 IMMUNIZATION ADMIN: CPT | Performed by: INTERNAL MEDICINE

## 2020-09-17 RX ORDER — FAMOTIDINE 20 MG/1
20 TABLET, FILM COATED ORAL 2 TIMES DAILY
Qty: 60 TAB | Refills: 3 | Status: SHIPPED | OUTPATIENT
Start: 2020-09-17 | End: 2021-06-24

## 2020-09-17 ASSESSMENT — FIBROSIS 4 INDEX: FIB4 SCORE: 6.28

## 2020-09-17 NOTE — PROGRESS NOTES
New Patient to establish    Chief Complaint   Patient presents with   • Establish Care       Subjective:     History of Present Illness: Patient is a 75 y.o. female who is here today to establish primary care    1. Former smoker  Previously she was smoking 1 pack/day for 3-4 years, she stopped smoking since February 2020, mention since then she is gaining some weight    2. Hypothyroidism due to acquired atrophy of thyroid  TSH:   Lab Results   Component Value Date/Time    TSHULTRASEN 0.690 12/12/2019 1140     THYROXINE (T4):   Lab Results   Component Value Date/Time    FREET4 1.27 12/12/2019 1140   Chronic, denied any symptom, she is compliant with 88 mcg daily Synthroid      3. Essential hypertension  Chronic, stable, compliant with losartan 100 mg daily, Aldactone 50 mg daily    4. Mixed hyperlipidemia  She is taking Lipitor 80 mg daily    5. S/P AAA repair  6. Coronary artery disease involving native coronary artery of native heart without angina pectoris  Patient is following up with cardiology, mention also she has a cardiology surgeon for the repair of AAA, status post 2 stent [place] 2009  - She stopped taking carvedilol, mention which was stopped by cardiology    7. Gastroesophageal reflux disease, esophagitis presence not specified  Chronic, is worsening after start smoking per patient, she is also having a access a lot of unhealthy carbs and sugar and other food that could attribute to acid reflux    9. Overweight  Patient has concern about gaining weight after stop smoking,  having a access a lot of unhealthy carbs and sugar and other food that could attribute to acid reflux      Current Outpatient Medications on File Prior to Visit   Medication Sig Dispense Refill   • losartan (COZAAR) 100 MG Tab      • spironolactone (ALDACTONE) 50 MG Tab TAKE ONE TABLET BY MOUTH DAILY 100 Tab 1   • levothyroxine (SYNTHROID) 88 MCG Tab Take 1 Tab by mouth Every morning on an empty stomach. 90 Tab 3   • escitalopram  (LEXAPRO) 20 MG tablet TAKE 1 TABLET BY MOUTH EVERY DAY 90 Tab 1   • traZODone (DESYREL) 150 MG Tab TAKE ONE TABLET BY MOUTH TWICE A DAY (Patient taking differently: Take 150 mg by mouth every day.) 180 Tab 2   • atorvastatin (LIPITOR) 80 MG tablet Take 1 Tab by mouth every day. 90 Tab 3   • aspirin (ASA) 325 MG TABS Take 325 mg by mouth every day.       No current facility-administered medications on file prior to visit.      Allergies   Allergen Reactions   • Pcn [Penicillins]      Patient Active Problem List    Diagnosis Date Noted   • Former smoker 09/17/2020   • GERD (gastroesophageal reflux disease) 09/17/2020   • Overweight 09/17/2020   • S/P AAA repair 06/24/2020   • Nonrheumatic aortic valve insufficiency 06/24/2020   • Ascending aorta dilatation (HCC) 03/12/2020   • Stage 3 chronic kidney disease (HCC) 09/17/2019   • Sleep apnea 05/22/2018   • Chronic insomnia 05/23/2016   • AAA (abdominal aortic aneurysm) (HCC) 04/20/2016   • Hypothyroid 10/17/2013   • Depression 09/26/2013   • CAD (coronary artery disease) 10/04/2012   • Hyperlipidemia 10/04/2012   • Hypertension 10/04/2012   • TIA (transient ischemic attack) 10/04/2012     Past Medical History:   Diagnosis Date   • AAA (abdominal aortic aneurysm) (HCC)    • CAD (coronary artery disease) 10/4/2012    2 stents [place] 2009    • Chronic insomnia 5/23/2016   • Depression 9/26/2013   • Diverticula of colon    • Hyperlipidemia 10/4/2012   • Hypertension    • Hypothyroid 10/17/2013   • Postural hypotension 10/7/2014     IMO load March 2020   • Sleep apnea 5/22/2018   • Thyroid disease    • TIA (transient ischemic attack) 10/4/2012    2003    • Tobacco abuse 4/20/2016     Past Surgical History:   Procedure Laterality Date   • STENT PLACEMENT  2008    Dr Can in [place]   • OTHER Left     foot   • OTHER      throat polyps removed (non cancerous)      Family History   Problem Relation Age of Onset   • Heart Disease Mother    • Heart Attack Mother    • Heart  "Disease Father    • Heart Attack Father    • Cancer Maternal Grandmother         stomach   • Diabetes Maternal Grandmother    • Cancer Maternal Grandfather         brain   • Stroke Paternal Grandmother      Social History     Tobacco Use   • Smoking status: Former Smoker     Packs/day: 0.25     Years: 40.00     Pack years: 10.00     Types: Cigarettes   • Smokeless tobacco: Never Used   • Tobacco comment: 3-4cigarettes daily   Substance Use Topics   • Alcohol use: No   • Drug use: No       ROS:     - Constitutional: Negative for fever, chills,    - Eye: Negative for blurry vision    -ENT: Negative for ear pain    - Respiratory: Negative for cough, hemoptysis    - Cardiovascular: Negative for chest pain     - Gastrointestinal: Negative for abdominal pain    - Genitourinary: Negative for dysuria    - Musculoskeletal: Negative for joint swelling    - Skin: Negative for itching    - Neurological: Negative for focal weakness     - Psychiatric/Behavioral: Negative for depression        Physical Exam:     /68 (BP Location: Right arm, Patient Position: Sitting, BP Cuff Size: Adult)   Pulse 65   Temp 36.8 °C (98.3 °F) (Temporal)   Ht 1.702 m (5' 7\")   Wt 72.4 kg (159 lb 9.6 oz)   SpO2 95%   BMI 25.00 kg/m²   General: Normal appearing. No distress.  ENT: oropharynx without exudates.    Eyes: conjunctiva clear lids without ptosis  Pulmonary: Clear to ausculation.  Normal effort.   Cardiovascular: Regular rate and rhythm  Abdomen: Soft, nontender,  Lymph: No cervical or supraclavicular palpable lymph nodes  Psych: Normal mood and affect.     I have reviewed pertinent labs and diagnostic tests associated with this visit with specific comments listed under the assessment and plan below      Assessment and Plan:     1. Former smoker  9. Overweight  -Congratulated patient regarding quitting smoking, educated regarding healthy diet,  -Lengthy discussion regarding healthy dietary options including plenty of vegetable, " reduce carb and sugar, regular exercise as tolerated, healthy fat/protein    2. Hypothyroidism due to acquired atrophy of thyroid  Continue Synthroid as above    3. Essential hypertension  Continue blood pressure medication as above    4. Mixed hyperlipidemia  Continue Lipitor as above    5. S/P AAA repair  6. Coronary artery disease involving native coronary artery of native heart without angina pectoris  Continue follow-up with cardiology    7. Gastroesophageal reflux disease, esophagitis presence not specified  - famotidine (PEPCID) 20 MG Tab; Take 1 Tab by mouth 2 times a day.  Dispense: 60 Tab; Refill: 3  -Advised patient regarding conservative management    8. Need for vaccination  - Shingles Vaccine (SHINGRIX)      Follow Up:      Return in about 3 months (around 12/17/2020) for follow up.    Please note that this dictation was created using voice recognition software. I have made every reasonable attempt to correct obvious errors, but I expect that there are errors of grammar and possibly content that I did not discover before finalizing the note.    Signed by: Liya Nickerson M.D.

## 2020-09-23 ENCOUNTER — NON-PROVIDER VISIT (OUTPATIENT)
Dept: MEDICAL GROUP | Facility: PHYSICIAN GROUP | Age: 75
End: 2020-09-23
Payer: MEDICARE

## 2020-09-23 DIAGNOSIS — Z23 NEED FOR VACCINATION: ICD-10-CM

## 2020-09-23 PROCEDURE — G0008 ADMIN INFLUENZA VIRUS VAC: HCPCS | Performed by: FAMILY MEDICINE

## 2020-09-23 PROCEDURE — 90662 IIV NO PRSV INCREASED AG IM: CPT | Performed by: FAMILY MEDICINE

## 2020-09-23 NOTE — PROGRESS NOTES
"Evie Morillo is a 75 y.o. female here for a non-provider visit for:   FLU    Reason for immunization: Annual Flu Vaccine  Immunization records indicate need for vaccine: Yes, confirmed with Epic  Minimum interval has been met for this vaccine: Yes  ABN completed: Yes    Order and dose verified by: Deidra BARKER Dated  8/15/2019 was given to patient: Yes  All IAC Questionnaire questions were answered \"No.\"    Patient tolerated injection and no adverse effects were observed or reported: Yes    Pt scheduled for next dose in series: Not Indicated    "

## 2020-09-24 RX ORDER — ESCITALOPRAM OXALATE 20 MG/1
TABLET ORAL
Qty: 90 TAB | Refills: 1 | Status: SHIPPED | OUTPATIENT
Start: 2020-09-24 | End: 2020-12-22 | Stop reason: SDUPTHER

## 2020-09-24 RX ORDER — LOSARTAN POTASSIUM 100 MG/1
TABLET ORAL
Qty: 90 TAB | Refills: 1 | Status: SHIPPED | OUTPATIENT
Start: 2020-09-24 | End: 2020-12-22 | Stop reason: SDUPTHER

## 2020-11-12 ENCOUNTER — PATIENT MESSAGE (OUTPATIENT)
Dept: MEDICAL GROUP | Facility: PHYSICIAN GROUP | Age: 75
End: 2020-11-12

## 2020-11-16 RX ORDER — SPIRONOLACTONE 50 MG/1
TABLET, FILM COATED ORAL
Qty: 100 TAB | Refills: 1 | Status: SHIPPED | OUTPATIENT
Start: 2020-11-16 | End: 2021-05-14

## 2020-11-28 ENCOUNTER — TELEPHONE (OUTPATIENT)
Dept: MEDICAL GROUP | Facility: PHYSICIAN GROUP | Age: 75
End: 2020-11-28

## 2020-12-03 DIAGNOSIS — F32.A DEPRESSION, UNSPECIFIED DEPRESSION TYPE: ICD-10-CM

## 2020-12-03 DIAGNOSIS — E03.4 HYPOTHYROIDISM DUE TO ACQUIRED ATROPHY OF THYROID: ICD-10-CM

## 2020-12-03 DIAGNOSIS — E78.5 HYPERLIPIDEMIA, UNSPECIFIED HYPERLIPIDEMIA TYPE: ICD-10-CM

## 2020-12-03 RX ORDER — TRAZODONE HYDROCHLORIDE 150 MG/1
TABLET ORAL
Qty: 180 TAB | Refills: 2 | Status: SHIPPED | OUTPATIENT
Start: 2020-12-03 | End: 2021-08-13

## 2020-12-03 RX ORDER — LEVOTHYROXINE SODIUM 88 UG/1
88 TABLET ORAL
Qty: 90 TAB | Refills: 3 | Status: SHIPPED | OUTPATIENT
Start: 2020-12-03 | End: 2021-04-10 | Stop reason: SDUPTHER

## 2020-12-03 RX ORDER — ATORVASTATIN CALCIUM 80 MG/1
80 TABLET, FILM COATED ORAL DAILY
Qty: 90 TAB | Refills: 3 | Status: SHIPPED | OUTPATIENT
Start: 2020-12-03 | End: 2021-11-10

## 2020-12-22 ENCOUNTER — APPOINTMENT (OUTPATIENT)
Dept: MEDICAL GROUP | Facility: PHYSICIAN GROUP | Age: 75
End: 2020-12-22
Payer: MEDICARE

## 2020-12-23 ENCOUNTER — OFFICE VISIT (OUTPATIENT)
Dept: URGENT CARE | Facility: PHYSICIAN GROUP | Age: 75
End: 2020-12-23
Payer: MEDICARE

## 2020-12-23 ENCOUNTER — HOSPITAL ENCOUNTER (OUTPATIENT)
Facility: MEDICAL CENTER | Age: 75
End: 2020-12-23
Attending: NURSE PRACTITIONER
Payer: MEDICARE

## 2020-12-23 VITALS
BODY MASS INDEX: 23.54 KG/M2 | TEMPERATURE: 97.3 F | WEIGHT: 150 LBS | SYSTOLIC BLOOD PRESSURE: 110 MMHG | DIASTOLIC BLOOD PRESSURE: 64 MMHG | HEART RATE: 79 BPM | OXYGEN SATURATION: 97 % | HEIGHT: 67 IN | RESPIRATION RATE: 16 BRPM

## 2020-12-23 DIAGNOSIS — J34.89 NASAL CONGESTION WITH RHINORRHEA: ICD-10-CM

## 2020-12-23 DIAGNOSIS — R53.83 OTHER FATIGUE: ICD-10-CM

## 2020-12-23 DIAGNOSIS — R09.81 NASAL CONGESTION WITH RHINORRHEA: ICD-10-CM

## 2020-12-23 DIAGNOSIS — J01.40 ACUTE PANSINUSITIS, RECURRENCE NOT SPECIFIED: ICD-10-CM

## 2020-12-23 LAB
COVID ORDER STATUS COVID19: NORMAL
SARS-COV-2 RNA RESP QL NAA+PROBE: NOTDETECTED
SPECIMEN SOURCE: NORMAL

## 2020-12-23 PROCEDURE — 99214 OFFICE O/P EST MOD 30 MIN: CPT | Performed by: NURSE PRACTITIONER

## 2020-12-23 PROCEDURE — U0003 INFECTIOUS AGENT DETECTION BY NUCLEIC ACID (DNA OR RNA); SEVERE ACUTE RESPIRATORY SYNDROME CORONAVIRUS 2 (SARS-COV-2) (CORONAVIRUS DISEASE [COVID-19]), AMPLIFIED PROBE TECHNIQUE, MAKING USE OF HIGH THROUGHPUT TECHNOLOGIES AS DESCRIBED BY CMS-2020-01-R: HCPCS

## 2020-12-23 RX ORDER — LOSARTAN POTASSIUM 100 MG/1
TABLET ORAL
Qty: 90 TAB | Refills: 1 | Status: SHIPPED | OUTPATIENT
Start: 2020-12-23 | End: 2021-03-18

## 2020-12-23 RX ORDER — ESCITALOPRAM OXALATE 20 MG/1
20 TABLET ORAL DAILY
Qty: 90 TAB | Refills: 0 | Status: SHIPPED | OUTPATIENT
Start: 2020-12-23 | End: 2021-03-01

## 2020-12-23 RX ORDER — DOXYCYCLINE HYCLATE 100 MG
100 TABLET ORAL 2 TIMES DAILY
Qty: 14 TAB | Refills: 0 | Status: SHIPPED | OUTPATIENT
Start: 2020-12-23 | End: 2020-12-30

## 2020-12-23 ASSESSMENT — ENCOUNTER SYMPTOMS
NAUSEA: 0
SINUS PAIN: 1
NECK PAIN: 0
DIZZINESS: 0
COUGH: 0
VOMITING: 0
ABDOMINAL PAIN: 0
DIARRHEA: 0
EYE DISCHARGE: 0
SORE THROAT: 0
CHILLS: 0
HEADACHES: 0
WEAKNESS: 0
FEVER: 0
EYE REDNESS: 0
SHORTNESS OF BREATH: 0
WHEEZING: 0
CONSTIPATION: 0
MYALGIAS: 0

## 2020-12-23 ASSESSMENT — FIBROSIS 4 INDEX: FIB4 SCORE: 6.28

## 2020-12-23 NOTE — PROGRESS NOTES
"Subjective:      Evie Morillo is a 75 y.o. female who presents with Sinus Pain (sinus pressure, congestion, diarrhea, x1 month )            HPI  Nasal congestion, sinus facial pressure, runny nose, blood tinged mucus x 1 month. Fatigue. H/o seasonal allergies. Multi-symptom med used last night caused diarrhea. Unknown exposure to COVID or other respiratory illness. Flonase, saline rinse: \"not working\". No n/v, sore throat cough or body aches.     PMH:  has a past medical history of AAA (abdominal aortic aneurysm) (HCC), CAD (coronary artery disease) (10/4/2012), Chronic insomnia (5/23/2016), Depression (9/26/2013), Diverticula of colon, Hyperlipidemia (10/4/2012), Hypertension, Hypothyroid (10/17/2013), Postural hypotension (10/7/2014), Sleep apnea (5/22/2018), Thyroid disease, TIA (transient ischemic attack) (10/4/2012), and Tobacco abuse (4/20/2016).  MEDS:   Current Outpatient Medications:   •  atorvastatin (LIPITOR) 80 MG tablet, Take 1 Tab by mouth every day., Disp: 90 Tab, Rfl: 3  •  levothyroxine (SYNTHROID) 88 MCG Tab, Take 1 Tab by mouth Every morning on an empty stomach., Disp: 90 Tab, Rfl: 3  •  traZODone (DESYREL) 150 MG Tab, TAKE ONE TABLET BY MOUTH TWICE A DAY, Disp: 180 Tab, Rfl: 2  •  spironolactone (ALDACTONE) 50 MG Tab, TAKE ONE TABLET BY MOUTH DAILY, Disp: 100 Tab, Rfl: 1  •  losartan (COZAAR) 100 MG Tab, TAKE ONE TABLET BY MOUTH DAILY, Disp: 90 Tab, Rfl: 1  •  escitalopram (LEXAPRO) 20 MG tablet, TAKE 1 TABLET BY MOUTH EVERY DAY, Disp: 90 Tab, Rfl: 1  •  famotidine (PEPCID) 20 MG Tab, Take 1 Tab by mouth 2 times a day., Disp: 60 Tab, Rfl: 3  •  aspirin (ASA) 325 MG TABS, Take 325 mg by mouth every day., Disp: , Rfl:   ALLERGIES:   Allergies   Allergen Reactions   • Pcn [Penicillins]      SURGHX:   Past Surgical History:   Procedure Laterality Date   • STENT PLACEMENT  2008    Dr Can in [place]   • OTHER Left     foot   • OTHER      throat polyps removed (non cancerous)      SOCHX:  " "reports that she has quit smoking. Her smoking use included cigarettes. She has a 10.00 pack-year smoking history. She has never used smokeless tobacco. She reports that she does not drink alcohol or use drugs.  FH: Family history was reviewed, no pertinent findings to report    Review of Systems   Constitutional: Positive for malaise/fatigue. Negative for chills and fever.   HENT: Positive for congestion and sinus pain. Negative for ear pain and sore throat.    Eyes: Negative for discharge and redness.   Respiratory: Negative for cough, shortness of breath and wheezing.    Gastrointestinal: Negative for abdominal pain, constipation, diarrhea, nausea and vomiting.   Musculoskeletal: Negative for myalgias and neck pain.   Skin: Negative for itching and rash.   Neurological: Negative for dizziness, weakness and headaches.   Endo/Heme/Allergies: Positive for environmental allergies.   All other systems reviewed and are negative.         Objective:     /64 (BP Location: Right arm, Patient Position: Sitting, BP Cuff Size: Adult)   Pulse 79   Temp 36.3 °C (97.3 °F) (Temporal)   Resp 16   Ht 1.702 m (5' 7\")   Wt 68 kg (150 lb)   SpO2 97%   BMI 23.49 kg/m²      Physical Exam  Vitals signs reviewed.   Constitutional:       General: She is awake. She is not in acute distress.     Appearance: Normal appearance. She is well-developed. She is not ill-appearing, toxic-appearing or diaphoretic.   HENT:      Head: Normocephalic.      Nose: Mucosal edema, congestion and rhinorrhea present.      Mouth/Throat:      Mouth: Mucous membranes are moist.   Eyes:      Conjunctiva/sclera: Conjunctivae normal.      Pupils: Pupils are equal, round, and reactive to light.   Neck:      Musculoskeletal: Normal range of motion and neck supple.   Cardiovascular:      Rate and Rhythm: Normal rate.   Pulmonary:      Effort: Pulmonary effort is normal. No tachypnea, accessory muscle usage or respiratory distress.   Musculoskeletal: Normal " range of motion.   Skin:     General: Skin is warm and dry.   Neurological:      Mental Status: She is alert and oriented to person, place, and time.      GCS: GCS eye subscore is 4. GCS verbal subscore is 5. GCS motor subscore is 6.   Psychiatric:         Attention and Perception: Attention and perception normal.         Mood and Affect: Mood and affect normal.         Speech: Speech normal.         Behavior: Behavior normal. Behavior is cooperative.                 Assessment/Plan:        1. Acute pansinusitis, recurrence not specified    - doxycycline (VIBRAMYCIN) 100 MG Tab; Take 1 Tab by mouth 2 times a day for 7 days.  Dispense: 14 Tab; Refill: 0    2. Nasal congestion with rhinorrhea    - COVID/SARS COV-2 PCR; Future    3. Other fatigue    - COVID/SARS COV-2 PCR; Future    Will treat for possible sinus infection as her symptoms x 1 month, will test for COVID as well as this time    Increase water intake  May use Tylenol prn for fever or body aches  Get rest  Salt water gargle prn  Flonase, saline nasal spray prn for nasal congestion  May use OTC cough suppressant medications like plain Robitussin/Delsym prn  OTC Immodium prn for diarrhea with diet modification to clear/bland diet  Monitor for fevers, worse cough, shortness of breath, CP, chest tightness, sinus problems- need re-evaluation  COVID tip sheet given     MyChart release for result, may take up to 72 hrs for result, patient notified

## 2020-12-23 NOTE — TELEPHONE ENCOUNTER
Last seen by PCP 09/17/2020.   Last Blood Pressure reading was 120/68 on 9/17/2020    Lab Results   Component Value Date/Time    SODIUM 139 12/12/2019 11:40 AM    POTASSIUM 4.2 12/12/2019 11:40 AM    CHLORIDE 109 12/12/2019 11:40 AM    CO2 26 12/12/2019 11:40 AM    GLUCOSE 83 12/12/2019 11:40 AM    BUN 24 (H) 12/12/2019 11:40 AM    CREATININE 1.02 12/12/2019 11:40 AM

## 2021-01-11 DIAGNOSIS — Z23 NEED FOR VACCINATION: ICD-10-CM

## 2021-01-15 ENCOUNTER — IMMUNIZATION (OUTPATIENT)
Dept: FAMILY PLANNING/WOMEN'S HEALTH CLINIC | Facility: IMMUNIZATION CENTER | Age: 76
End: 2021-01-15
Attending: INTERNAL MEDICINE
Payer: MEDICARE

## 2021-01-15 DIAGNOSIS — Z23 NEED FOR VACCINATION: ICD-10-CM

## 2021-01-15 DIAGNOSIS — Z23 ENCOUNTER FOR VACCINATION: Primary | ICD-10-CM

## 2021-01-15 PROCEDURE — 91300 PFIZER SARS-COV-2 VACCINE: CPT

## 2021-01-15 PROCEDURE — 0001A PFIZER SARS-COV-2 VACCINE: CPT

## 2021-02-04 ENCOUNTER — IMMUNIZATION (OUTPATIENT)
Dept: FAMILY PLANNING/WOMEN'S HEALTH CLINIC | Facility: IMMUNIZATION CENTER | Age: 76
End: 2021-02-04
Attending: INTERNAL MEDICINE
Payer: MEDICARE

## 2021-02-04 DIAGNOSIS — Z23 ENCOUNTER FOR VACCINATION: Primary | ICD-10-CM

## 2021-02-04 PROCEDURE — 0002A PFIZER SARS-COV-2 VACCINE: CPT

## 2021-02-04 PROCEDURE — 91300 PFIZER SARS-COV-2 VACCINE: CPT

## 2021-03-01 ENCOUNTER — OFFICE VISIT (OUTPATIENT)
Dept: MEDICAL GROUP | Facility: MEDICAL CENTER | Age: 76
End: 2021-03-01
Payer: MEDICARE

## 2021-03-01 VITALS
HEART RATE: 74 BPM | TEMPERATURE: 98.3 F | OXYGEN SATURATION: 95 % | DIASTOLIC BLOOD PRESSURE: 66 MMHG | BODY MASS INDEX: 23.7 KG/M2 | SYSTOLIC BLOOD PRESSURE: 90 MMHG | WEIGHT: 147.49 LBS | HEIGHT: 66 IN

## 2021-03-01 DIAGNOSIS — E03.4 HYPOTHYROIDISM DUE TO ACQUIRED ATROPHY OF THYROID: ICD-10-CM

## 2021-03-01 DIAGNOSIS — Z76.89 ENCOUNTER TO ESTABLISH CARE: ICD-10-CM

## 2021-03-01 DIAGNOSIS — N18.31 STAGE 3A CHRONIC KIDNEY DISEASE: ICD-10-CM

## 2021-03-01 DIAGNOSIS — R53.83 OTHER FATIGUE: ICD-10-CM

## 2021-03-01 DIAGNOSIS — Z98.890 S/P AAA (ABDOMINAL AORTIC ANEURYSM) REPAIR: ICD-10-CM

## 2021-03-01 DIAGNOSIS — I25.10 CORONARY ARTERY DISEASE INVOLVING NATIVE CORONARY ARTERY OF NATIVE HEART WITHOUT ANGINA PECTORIS: Chronic | ICD-10-CM

## 2021-03-01 DIAGNOSIS — E78.5 DYSLIPIDEMIA: ICD-10-CM

## 2021-03-01 DIAGNOSIS — Z86.79 S/P AAA (ABDOMINAL AORTIC ANEURYSM) REPAIR: ICD-10-CM

## 2021-03-01 PROBLEM — G47.30 SLEEP APNEA: Chronic | Status: ACTIVE | Noted: 2018-05-22

## 2021-03-01 PROBLEM — E66.3 OVERWEIGHT: Status: RESOLVED | Noted: 2020-09-17 | Resolved: 2021-03-01

## 2021-03-01 PROBLEM — K21.9 GERD (GASTROESOPHAGEAL REFLUX DISEASE): Chronic | Status: ACTIVE | Noted: 2020-09-17

## 2021-03-01 PROCEDURE — 99214 OFFICE O/P EST MOD 30 MIN: CPT | Performed by: NURSE PRACTITIONER

## 2021-03-01 ASSESSMENT — FIBROSIS 4 INDEX: FIB4 SCORE: 6.28

## 2021-03-01 NOTE — PROGRESS NOTES
Chief Complaint   Patient presents with   • Establish Care     Prior PCP Dr. Liya BOUCHER Ilia is a 75 y.o. female here to establish care and to discuss the evaluation and management of:      Former PCP: Liya.    Requesting labs.      Fatigue   Complains of having fatigue.  Has been ongoing for quite some time.  Does not exercise.  Has gained weight since quitting smoking.    ECHO  7/1/2020  Normal left ventricular chamber size.  Left ventricular ejection fraction is visually estimated to be 70%.  Mild to moderate aortic insufficiency.  Estimated right ventricular systolic pressure  is 25 mmHg.  Ascending aorta is dilated with a diameter of  4.4 cm.    History of AAA repair  Patient reports having a AAA repair with an endograft and 2 femoral stents with Dr. Wood at [facility][1] in 2020.  We will be requesting records of this.  She does state that she is due for an ultrasound soon and will follow up with Dr. Wood for this.   Quit smoking prior to her surgery.     US-Aorta/iliac duplex 03/17/2020  Residual aneurysm measures 4.9 x 4.9 cm. No endoleak identified.    CAD  Hx of x2 stents in [place] in  2009. Followed by Dr. Pulido.     Hx of TIA  Reports has had 3 TIA's -reports these were in [place] in 2001 and 2003.    On Asprin 81mg daily. Taking Atorvastatin 80mg. No myalgias.     HTN  Taking losartan 100 mg and spironolactone 50 mg for this.  Reports compliance with this.    Thyroid  Taking Levothyroxine 88mcg daily for this.  Complains of fatigue.    Osteopenia  Last DEXA on 9/2019.  Does not report taking any calcium or vitamin D.  Treatment    History of Tobacco use  Hx of tobacco use quit 2/2020.     CKD  Found on reviewing labs.    Dyslipidemia  Reports compliance with her atorvastatin 80 mg.  Denies myalgias.      ROS:  Denies any Headache, Blurred Vision, Confusion, Chest pain,  Shortness of breath,  Abdominal pain, Changes of bowel or bladder, Hematuria,  Hematochezia, Lower ext. edema, Fevers, Nights sweats, Weight Changes, Focal weakness or numbness.  And all other systems reviewed and all are negative. Positive for : Fatigue      Current Outpatient Medications:   •  losartan (COZAAR) 100 MG Tab, TAKE ONE TABLET BY MOUTH DAILY, Disp: 90 Tab, Rfl: 1  •  atorvastatin (LIPITOR) 80 MG tablet, Take 1 Tab by mouth every day., Disp: 90 Tab, Rfl: 3  •  levothyroxine (SYNTHROID) 88 MCG Tab, Take 1 Tab by mouth Every morning on an empty stomach., Disp: 90 Tab, Rfl: 3  •  traZODone (DESYREL) 150 MG Tab, TAKE ONE TABLET BY MOUTH TWICE A DAY, Disp: 180 Tab, Rfl: 2  •  spironolactone (ALDACTONE) 50 MG Tab, TAKE ONE TABLET BY MOUTH DAILY, Disp: 100 Tab, Rfl: 1  •  famotidine (PEPCID) 20 MG Tab, Take 1 Tab by mouth 2 times a day., Disp: 60 Tab, Rfl: 3  •  aspirin (ASA) 325 MG TABS, Take 325 mg by mouth every day., Disp: , Rfl:     Allergies   Allergen Reactions   • Pcn [Penicillins]        Past Medical History:   Diagnosis Date   • AAA (abdominal aortic aneurysm) (HCC)     had surgery for this.   • CAD (coronary artery disease) 10/4/2012    2 stents [place] 2009    • Chronic insomnia 5/23/2016   • Depression 9/26/2013    not a current issue-not on medications   • Diverticula of colon    • Hyperlipidemia 10/4/2012   • Hypertension    • Hypothyroid 10/17/2013   • Postural hypotension 10/7/2014     IMO load March 2020   • Sleep apnea 05/22/2018    had CPAP at one time-not at using at this time   • TIA (transient ischemic attack) 10/04/2012    2001 (x2) and then one in 2003   • Tobacco abuse 4/20/2016     Past Surgical History:   Procedure Laterality Date   • STENT PLACEMENT  2008    x 2 Dr [place] in [place]   • OTHER Left     plantar wart removed on left foot   • OTHER      throat polyps removed (non cancerous) > 20 years ago     Family History   Problem Relation Age of Onset   • Heart Disease Mother    • Heart Attack Mother    • Heart Disease Father    • Heart Attack Father    •  "Cancer Maternal Grandmother         stomach   • Diabetes Maternal Grandmother    • Cancer Maternal Grandfather         brain   • Stroke Paternal Grandmother      Social History     Socioeconomic History   • Marital status:      Spouse name: Not on file   • Number of children: Not on file   • Years of education: Not on file   • Highest education level: Not on file   Occupational History   • Not on file   Tobacco Use   • Smoking status: Former Smoker     Packs/day: 0.25     Years: 40.00     Pack years: 10.00     Types: Cigarettes     Quit date: 2020     Years since quittin.0   • Smokeless tobacco: Never Used   Substance and Sexual Activity   • Alcohol use: No   • Drug use: No   • Sexual activity: Never     Partners: Male   Other Topics Concern   • Not on file   Social History Narrative    On disability      Social Determinants of Health     Financial Resource Strain:    • Difficulty of Paying Living Expenses:    Food Insecurity:    • Worried About Running Out of Food in the Last Year:    • Ran Out of Food in the Last Year:    Transportation Needs:    • Lack of Transportation (Medical):    • Lack of Transportation (Non-Medical):    Physical Activity:    • Days of Exercise per Week:    • Minutes of Exercise per Session:    Stress:    • Feeling of Stress :    Social Connections:    • Frequency of Communication with Friends and Family:    • Frequency of Social Gatherings with Friends and Family:    • Attends [employer] Services:    • Active Member of Clubs or Organizations:    • Attends Club or Organization Meetings:    • Marital Status:    Intimate Partner Violence:    • Fear of Current or Ex-Partner:    • Emotionally Abused:    • Physically Abused:    • Sexually Abused:        Objective:     Vitals: BP (!) 90/66 (BP Location: Right arm, Patient Position: Sitting, BP Cuff Size: Adult)   Pulse 74   Temp 36.8 °C (98.3 °F) (Temporal)   Ht 1.664 m (5' 5.5\")   Wt 66.9 kg (147 lb 7.8 oz)   SpO2 95%   " Breastfeeding No   BMI 24.17 kg/m²      General: Alert, pleasant, NAD  HEENT:  Normocephalic.  Neck supple.  No thyromegaly or masses palpated. No cervical or supraclavicular lymphadenopathy.  Heart:  Regular rate and rhythm.  S1 and S2 normal.  No murmurs appreciated.    Respiratory:  Normal respiratory effort.  Clear to auscultation bilaterally.    Skin:  Warm, dry, no rashes  Musculoskeletal:  Gait is normal.  Moves all extremities well.  Extremities:   No leg edema.  Neurological: No tremors  Psych:  Affect/mood is normal, judgement is good, memory is intact, grooming is appropriate.      Assessment and Plan.     75 y.o. female to establish care and discuss the followin. Other fatigue  New problem for the patient.  Has mentioned that she does have a history of having sleep apnea-not on CPAP. ?re-evaluate. Check labs.  - CBC WITHOUT DIFFERENTIAL; Future    2. Hypothyroidism due to acquired atrophy of thyroid  Due for labs.  Continue current regimen at this time may adjust accordingly once labs are available.  - TSH WITH REFLEX TO FT4; Future    3.  Status post AAA (abdominal aortic aneurysm) repair  Routine follow-up with vascular surgeon.      4. Coronary artery disease involving native coronary artery of native heart without angina pectoris  Asymptomatic today. On high dose statin and aspirin therapy.  Continue to avoid tobacco products.  Continue to follow-up with cardiology.    5. Stage 3a chronic kidney disease  Recheck labs.  - MICROALBUMIN CREAT RATIO URINE; Future  - Comp Metabolic Panel; Future    6. Dyslipidemia  On high dose statin therapy  - Lipid Profile; Future    7. Encounter to establish care  Requesting records.      Return for Lab results.          Cindy NAIR.

## 2021-03-02 ENCOUNTER — HOSPITAL ENCOUNTER (OUTPATIENT)
Dept: LAB | Facility: MEDICAL CENTER | Age: 76
End: 2021-03-02
Attending: NURSE PRACTITIONER
Payer: MEDICARE

## 2021-03-02 DIAGNOSIS — R53.83 OTHER FATIGUE: ICD-10-CM

## 2021-03-02 DIAGNOSIS — E78.5 DYSLIPIDEMIA: ICD-10-CM

## 2021-03-02 DIAGNOSIS — N18.31 STAGE 3A CHRONIC KIDNEY DISEASE: ICD-10-CM

## 2021-03-02 DIAGNOSIS — E03.4 HYPOTHYROIDISM DUE TO ACQUIRED ATROPHY OF THYROID: ICD-10-CM

## 2021-03-02 LAB
ALBUMIN SERPL BCP-MCNC: 3.5 G/DL (ref 3.2–4.9)
ALBUMIN/GLOB SERPL: 1 G/DL
ALP SERPL-CCNC: 82 U/L (ref 30–99)
ALT SERPL-CCNC: 19 U/L (ref 2–50)
ANION GAP SERPL CALC-SCNC: 9 MMOL/L (ref 7–16)
AST SERPL-CCNC: 42 U/L (ref 12–45)
BILIRUB SERPL-MCNC: 0.6 MG/DL (ref 0.1–1.5)
BUN SERPL-MCNC: 14 MG/DL (ref 8–22)
CALCIUM SERPL-MCNC: 9.1 MG/DL (ref 8.5–10.5)
CHLORIDE SERPL-SCNC: 109 MMOL/L (ref 96–112)
CHOLEST SERPL-MCNC: 105 MG/DL (ref 100–199)
CO2 SERPL-SCNC: 25 MMOL/L (ref 20–33)
CREAT SERPL-MCNC: 1 MG/DL (ref 0.5–1.4)
CREAT UR-MCNC: 198.27 MG/DL
ERYTHROCYTE [DISTWIDTH] IN BLOOD BY AUTOMATED COUNT: 50.6 FL (ref 35.9–50)
FASTING STATUS PATIENT QL REPORTED: NORMAL
GLOBULIN SER CALC-MCNC: 3.4 G/DL (ref 1.9–3.5)
GLUCOSE SERPL-MCNC: 90 MG/DL (ref 65–99)
HCT VFR BLD AUTO: 36.9 % (ref 37–47)
HDLC SERPL-MCNC: 35 MG/DL
HGB BLD-MCNC: 11.9 G/DL (ref 12–16)
LDLC SERPL CALC-MCNC: 57 MG/DL
MCH RBC QN AUTO: 32.2 PG (ref 27–33)
MCHC RBC AUTO-ENTMCNC: 32.2 G/DL (ref 33.6–35)
MCV RBC AUTO: 100 FL (ref 81.4–97.8)
MICROALBUMIN UR-MCNC: <1.2 MG/DL
MICROALBUMIN/CREAT UR: NORMAL MG/G (ref 0–30)
PLATELET # BLD AUTO: 100 K/UL (ref 164–446)
PMV BLD AUTO: 11.6 FL (ref 9–12.9)
POTASSIUM SERPL-SCNC: 3.7 MMOL/L (ref 3.6–5.5)
PROT SERPL-MCNC: 6.9 G/DL (ref 6–8.2)
RBC # BLD AUTO: 3.69 M/UL (ref 4.2–5.4)
SODIUM SERPL-SCNC: 143 MMOL/L (ref 135–145)
TRIGL SERPL-MCNC: 63 MG/DL (ref 0–149)
WBC # BLD AUTO: 2.6 K/UL (ref 4.8–10.8)

## 2021-03-02 PROCEDURE — 84443 ASSAY THYROID STIM HORMONE: CPT

## 2021-03-02 PROCEDURE — 36415 COLL VENOUS BLD VENIPUNCTURE: CPT

## 2021-03-02 PROCEDURE — 82043 UR ALBUMIN QUANTITATIVE: CPT

## 2021-03-02 PROCEDURE — 80053 COMPREHEN METABOLIC PANEL: CPT

## 2021-03-02 PROCEDURE — 85027 COMPLETE CBC AUTOMATED: CPT

## 2021-03-02 PROCEDURE — 82570 ASSAY OF URINE CREATININE: CPT

## 2021-03-02 PROCEDURE — 80061 LIPID PANEL: CPT

## 2021-03-03 DIAGNOSIS — D64.9 ANEMIA, UNSPECIFIED TYPE: ICD-10-CM

## 2021-03-03 LAB — TSH SERPL DL<=0.005 MIU/L-ACNC: 0.53 UIU/ML (ref 0.38–5.33)

## 2021-03-09 ENCOUNTER — HOSPITAL ENCOUNTER (OUTPATIENT)
Dept: LAB | Facility: MEDICAL CENTER | Age: 76
End: 2021-03-09
Attending: NURSE PRACTITIONER
Payer: MEDICARE

## 2021-03-09 DIAGNOSIS — D64.9 ANEMIA, UNSPECIFIED TYPE: ICD-10-CM

## 2021-03-09 LAB
BASOPHILS # BLD AUTO: 0.3 % (ref 0–1.8)
BASOPHILS # BLD: 0.01 K/UL (ref 0–0.12)
EOSINOPHIL # BLD AUTO: 0.1 K/UL (ref 0–0.51)
EOSINOPHIL NFR BLD: 3.2 % (ref 0–6.9)
ERYTHROCYTE [DISTWIDTH] IN BLOOD BY AUTOMATED COUNT: 52.1 FL (ref 35.9–50)
FERRITIN SERPL-MCNC: 215 NG/ML (ref 10–291)
HCT VFR BLD AUTO: 39 % (ref 37–47)
HGB BLD-MCNC: 12.2 G/DL (ref 12–16)
IMM GRANULOCYTES # BLD AUTO: 0.01 K/UL (ref 0–0.11)
IMM GRANULOCYTES NFR BLD AUTO: 0.3 % (ref 0–0.9)
IRON SATN MFR SERPL: 34 % (ref 15–55)
IRON SERPL-MCNC: 106 UG/DL (ref 40–170)
LYMPHOCYTES # BLD AUTO: 0.78 K/UL (ref 1–4.8)
LYMPHOCYTES NFR BLD: 25.2 % (ref 22–41)
MCH RBC QN AUTO: 31.8 PG (ref 27–33)
MCHC RBC AUTO-ENTMCNC: 31.3 G/DL (ref 33.6–35)
MCV RBC AUTO: 101.6 FL (ref 81.4–97.8)
MONOCYTES # BLD AUTO: 0.26 K/UL (ref 0–0.85)
MONOCYTES NFR BLD AUTO: 8.4 % (ref 0–13.4)
NEUTROPHILS # BLD AUTO: 1.93 K/UL (ref 2–7.15)
NEUTROPHILS NFR BLD: 62.6 % (ref 44–72)
NRBC # BLD AUTO: 0 K/UL
NRBC BLD-RTO: 0 /100 WBC
PLATELET # BLD AUTO: 120 K/UL (ref 164–446)
PMV BLD AUTO: 11.9 FL (ref 9–12.9)
RBC # BLD AUTO: 3.84 M/UL (ref 4.2–5.4)
TIBC SERPL-MCNC: 310 UG/DL (ref 250–450)
UIBC SERPL-MCNC: 204 UG/DL (ref 110–370)
WBC # BLD AUTO: 3.1 K/UL (ref 4.8–10.8)

## 2021-03-09 PROCEDURE — 82728 ASSAY OF FERRITIN: CPT

## 2021-03-09 PROCEDURE — 36415 COLL VENOUS BLD VENIPUNCTURE: CPT

## 2021-03-09 PROCEDURE — 84207 ASSAY OF VITAMIN B-6: CPT

## 2021-03-09 PROCEDURE — 85025 COMPLETE CBC W/AUTO DIFF WBC: CPT

## 2021-03-09 PROCEDURE — 83540 ASSAY OF IRON: CPT

## 2021-03-09 PROCEDURE — 83550 IRON BINDING TEST: CPT

## 2021-03-11 LAB — VIT B6 SERPL-MCNC: 9.9 NMOL/L (ref 20–125)

## 2021-03-12 DIAGNOSIS — E53.1 VITAMIN B6 DEFICIENCY: ICD-10-CM

## 2021-03-12 DIAGNOSIS — D75.89 MACROCYTOSIS: ICD-10-CM

## 2021-03-15 ENCOUNTER — HOSPITAL ENCOUNTER (OUTPATIENT)
Dept: RADIOLOGY | Facility: MEDICAL CENTER | Age: 76
End: 2021-03-15
Attending: SURGERY
Payer: MEDICARE

## 2021-03-15 DIAGNOSIS — I71.40 ABDOMINAL AORTIC ANEURYSM WITHOUT RUPTURE (HCC): ICD-10-CM

## 2021-03-15 PROCEDURE — 93978 VASCULAR STUDY: CPT

## 2021-03-18 ENCOUNTER — OFFICE VISIT (OUTPATIENT)
Dept: MEDICAL GROUP | Facility: MEDICAL CENTER | Age: 76
End: 2021-03-18
Payer: MEDICARE

## 2021-03-18 VITALS
WEIGHT: 149.03 LBS | HEIGHT: 66 IN | DIASTOLIC BLOOD PRESSURE: 52 MMHG | BODY MASS INDEX: 23.95 KG/M2 | SYSTOLIC BLOOD PRESSURE: 88 MMHG | TEMPERATURE: 98 F | HEART RATE: 62 BPM | OXYGEN SATURATION: 93 %

## 2021-03-18 DIAGNOSIS — D69.6 THROMBOCYTOPENIA (HCC): ICD-10-CM

## 2021-03-18 DIAGNOSIS — I95.9 HYPOTENSION, UNSPECIFIED HYPOTENSION TYPE: ICD-10-CM

## 2021-03-18 DIAGNOSIS — I71.40 ABDOMINAL AORTIC ANEURYSM, WITHOUT RUPTURE: ICD-10-CM

## 2021-03-18 DIAGNOSIS — D75.89 MACROCYTOSIS: ICD-10-CM

## 2021-03-18 DIAGNOSIS — D70.9 NEUTROPENIA, UNSPECIFIED TYPE (HCC): ICD-10-CM

## 2021-03-18 DIAGNOSIS — E53.1 VITAMIN B6 DEFICIENCY: ICD-10-CM

## 2021-03-18 DIAGNOSIS — K59.00 CONSTIPATION, UNSPECIFIED CONSTIPATION TYPE: ICD-10-CM

## 2021-03-18 PROCEDURE — 99214 OFFICE O/P EST MOD 30 MIN: CPT | Performed by: NURSE PRACTITIONER

## 2021-03-18 PROCEDURE — 93000 ELECTROCARDIOGRAM COMPLETE: CPT | Performed by: NURSE PRACTITIONER

## 2021-03-18 RX ORDER — LOSARTAN POTASSIUM 100 MG/1
50 TABLET ORAL DAILY
Qty: 90 TABLET | Refills: 1 | Status: SHIPPED | OUTPATIENT
Start: 2021-03-18 | End: 2021-07-07 | Stop reason: SDUPTHER

## 2021-03-18 ASSESSMENT — FIBROSIS 4 INDEX: FIB4 SCORE: 6.18

## 2021-03-18 NOTE — PROGRESS NOTES
"Chief Complaint   Patient presents with   • Lab Results     DOS: 3/9/2021   • Constipation     passing gas, bloated, no abd pain, ever since starting OTC B Complex x 1 week       Subjective:     HPI:     Evie Morillo is a 75 y.o. female here to discuss the evaluation and management of:    Here to review labs.     Neutropenia  Initial WBC at 2.6, repeat labs with differential of a BC at 3.1 and ANC at 1.93. No night sweats, abnormal weight loss. Has had some weight gain.  Does endorse reduced appetite.    Vitamin B6 deficiency  Severely deficient on labs.  She is now taking supplementation for this.        Ref. Range 3/9/2021 09:55   Vitamin B6 Latest Ref Range: 20.0 - 125.0 nmol/L 9.9 (L)     Macrocytic  On previous labs.    Thrombocytopenia  Most recent platelet count stable.  Denies any excessive bleeding.    Constipation  Having constipation lately however states it somewhat normal for her to have this.  No bloood or mucous in her stool.  Have discussed Metamucil daily and stool softeners.     Hypotensive  Patient is found to be hypotensive in the clinic.  States that she is not necessarily dizzy however she does feel \"out of sorts.\"   She does report taking her blood pressure medications this morning which include spironolactone 50 mg and losartan 100 mg.  We will have her reduce down to 50 mg of the losartan at this time because she is somewhat symptomatic.    ROS:  Denies any Headache, Blurred Vision, Confusion, Chest pain,  Shortness of breath,  Abdominal pain, Changes of bowel or bladder, Lower ext edema, Fevers, Nights sweats, Weight Changes, Focal weakness or numbness.  And all other systems reviewed and are all negative. POSITIVE FOR dizzy, constipation        Current Outpatient Medications:   •  losartan (COZAAR) 100 MG Tab, Take 0.5 Tablets by mouth every day., Disp: 90 tablet, Rfl: 1  •  atorvastatin (LIPITOR) 80 MG tablet, Take 1 Tab by mouth every day., Disp: 90 Tab, Rfl: 3  •  levothyroxine " (SYNTHROID) 88 MCG Tab, Take 1 Tab by mouth Every morning on an empty stomach., Disp: 90 Tab, Rfl: 3  •  traZODone (DESYREL) 150 MG Tab, TAKE ONE TABLET BY MOUTH TWICE A DAY, Disp: 180 Tab, Rfl: 2  •  spironolactone (ALDACTONE) 50 MG Tab, TAKE ONE TABLET BY MOUTH DAILY, Disp: 100 Tab, Rfl: 1  •  famotidine (PEPCID) 20 MG Tab, Take 1 Tab by mouth 2 times a day., Disp: 60 Tab, Rfl: 3  •  aspirin (ASA) 325 MG TABS, Take 325 mg by mouth every day., Disp: , Rfl:     Allergies   Allergen Reactions   • Pcn [Penicillins]        Past Medical History:   Diagnosis Date   • AAA (abdominal aortic aneurysm) ([facility]ly [facility])     had surgery for this.   • CAD (coronary artery disease) 10/4/2012    2 stents [place] 2009    • Chronic insomnia 5/23/2016   • Depression 9/26/2013    not a current issue-not on medications   • Diverticula of colon    • Hyperlipidemia 10/4/2012   • Hypertension    • Hypothyroid 10/17/2013   • Postural hypotension 10/7/2014     IMO load March 2020   • Sleep apnea 05/22/2018    had CPAP at one time-not at using at this time   • TIA (transient ischemic attack) 10/04/2012    2001 (x2) and then one in 2003   • Tobacco abuse 4/20/2016     Past Surgical History:   Procedure Laterality Date   • STENT PLACEMENT  2008    x 2 Dr Can in [place]   • OTHER Left     plantar wart removed on left foot   • OTHER      throat polyps removed (non cancerous) > 20 years ago     Family History   Problem Relation Age of Onset   • Heart Disease Mother    • Heart Attack Mother    • Heart Disease Father    • Heart Attack Father    • Cancer Maternal Grandmother         stomach   • Diabetes Maternal Grandmother    • Cancer Maternal Grandfather         brain   • Stroke Paternal Grandmother      Social History     Socioeconomic History   • Marital status:      Spouse name: Not on file   • Number of children: Not on file   • Years of education: Not on file   • Highest education level: Not on file   Occupational History   • Not on file  "  Tobacco Use   • Smoking status: Former Smoker     Packs/day: 0.25     Years: 40.00     Pack years: 10.00     Types: Cigarettes     Quit date: 2020     Years since quittin.0   • Smokeless tobacco: Never Used   Substance and Sexual Activity   • Alcohol use: No   • Drug use: No   • Sexual activity: Never     Partners: Male   Other Topics Concern   • Not on file   Social History Narrative    On disability      Social Determinants of Health     Financial Resource Strain:    • Difficulty of Paying Living Expenses:    Food Insecurity:    • Worried About Running Out of Food in the Last Year:    • Ran Out of Food in the Last Year:    Transportation Needs:    • Lack of Transportation (Medical):    • Lack of Transportation (Non-Medical):    Physical Activity:    • Days of Exercise per Week:    • Minutes of Exercise per Session:    Stress:    • Feeling of Stress :    Social Connections:    • Frequency of Communication with Friends and Family:    • Frequency of Social Gatherings with Friends and Family:    • Attends [employer] Services:    • Active Member of Clubs or Organizations:    • Attends Club or Organization Meetings:    • Marital Status:    Intimate Partner Violence:    • Fear of Current or Ex-Partner:    • Emotionally Abused:    • Physically Abused:    • Sexually Abused:        Objective:     Vitals: BP (!) 88/52 (BP Location: Right arm, Patient Position: Sitting, BP Cuff Size: Adult)   Pulse 62   Temp 36.7 °C (98 °F) (Temporal)   Ht 1.664 m (5' 5.5\")   Wt 67.6 kg (149 lb 0.5 oz)   SpO2 93%   BMI 24.42 kg/m²    General: Alert, pleasant, NAD  HEENT: Normocephalic.  Neck supple.  No thyromegaly or masses palpated. No cervical or supraclavicular lymphadenopathy.  Heart: Regular rate and rhythm.  S1 and S2 normal.  No murmurs appreciated.  Respiratory: Normal respiratory effort.  Clear to auscultation bilaterally. No wheezing  Skin: Warm, dry, no rashes.  Extremities: No leg edema. No " discoloration  Neurological: No tremors  Psych:  Affect/mood is normal, judgement is good, memory is intact, grooming is appropriate.    EKG:   EKG was read and interpreted by me in the clinic today.  Sinus bradycardia-heart rate at 57.  No evidence of bundle branch block.  Borderline conduction delay.    Assessment/Plan:     Evie was seen today for lab results and constipation.    Diagnoses and all orders for this visit:    Neutropenia, unspecified type (HCC)  Trending low-WBC at 3.1. No fevers or weight loss. Most recent ANC at 1.93. No s/s of infection. Not on any medications that may be contributing. ?r/t Vitamin B deficiency. Recheck CBC after correction of deficiency.     Thrombocytopenia (HCC)  Stable. Routing monitoring.     Vitamin B6 deficiency  Severely deficient.  Continue with supplementation.  Recheck labs in 1 month.    Hypotension, unspecified hypotension type  Present in the clinic today. Reduce losartan down to 50 mg daily.  Check blood pressure daily. No evidence of BBB on EKG. Have discussed symptoms of hypotension and emergent precautions.  -     EKG  -     losartan (COZAAR) 100 MG Tab; Take 0.5 Tablets by mouth every day.    Macrocytosis  Likely related to deficiency of vitamin B.  H&H stable. Currently on supplementation for this.    Constipation, unspecified constipation type  Recommend dietary modification to include more healthy, high-fiber foods and drinking plenty of water, exercise.  Metamucil or (other fiber supplement) may use 1 tablespoon in water every day and MiraLAX as needed for constipation.    Abdominal aortic aneurysm, without rupture (HCC)   Followed by vascular surgeon for this.  -     EKG      No follow-ups on file.    {I have placed the above orders and discussed them with an approved delegating provider.  The MA is performing the below orders under the direction of Dr. Justus LAZO

## 2021-03-19 PROBLEM — D70.9 NEUTROPENIA (HCC): Status: ACTIVE | Noted: 2021-03-19

## 2021-04-10 DIAGNOSIS — E03.4 HYPOTHYROIDISM DUE TO ACQUIRED ATROPHY OF THYROID: ICD-10-CM

## 2021-04-12 RX ORDER — LEVOTHYROXINE SODIUM 88 UG/1
88 TABLET ORAL
Qty: 90 TABLET | Refills: 3 | Status: SHIPPED | OUTPATIENT
Start: 2021-04-12 | End: 2022-04-13

## 2021-04-19 ENCOUNTER — HOSPITAL ENCOUNTER (OUTPATIENT)
Dept: LAB | Facility: MEDICAL CENTER | Age: 76
End: 2021-04-19
Attending: NURSE PRACTITIONER
Payer: MEDICARE

## 2021-04-19 DIAGNOSIS — E53.1 VITAMIN B6 DEFICIENCY: ICD-10-CM

## 2021-04-19 DIAGNOSIS — D70.9 NEUTROPENIA, UNSPECIFIED TYPE (HCC): ICD-10-CM

## 2021-04-19 LAB
BASOPHILS # BLD AUTO: 0.6 % (ref 0–1.8)
BASOPHILS # BLD: 0.02 K/UL (ref 0–0.12)
EOSINOPHIL # BLD AUTO: 0.06 K/UL (ref 0–0.51)
EOSINOPHIL NFR BLD: 1.8 % (ref 0–6.9)
ERYTHROCYTE [DISTWIDTH] IN BLOOD BY AUTOMATED COUNT: 50.2 FL (ref 35.9–50)
HCT VFR BLD AUTO: 38.6 % (ref 37–47)
HGB BLD-MCNC: 12.2 G/DL (ref 12–16)
IMM GRANULOCYTES # BLD AUTO: 0.01 K/UL (ref 0–0.11)
IMM GRANULOCYTES NFR BLD AUTO: 0.3 % (ref 0–0.9)
LYMPHOCYTES # BLD AUTO: 0.82 K/UL (ref 1–4.8)
LYMPHOCYTES NFR BLD: 24.8 % (ref 22–41)
MCH RBC QN AUTO: 31.6 PG (ref 27–33)
MCHC RBC AUTO-ENTMCNC: 31.6 G/DL (ref 33.6–35)
MCV RBC AUTO: 100 FL (ref 81.4–97.8)
MONOCYTES # BLD AUTO: 0.29 K/UL (ref 0–0.85)
MONOCYTES NFR BLD AUTO: 8.8 % (ref 0–13.4)
NEUTROPHILS # BLD AUTO: 2.1 K/UL (ref 2–7.15)
NEUTROPHILS NFR BLD: 63.7 % (ref 44–72)
NRBC # BLD AUTO: 0 K/UL
NRBC BLD-RTO: 0 /100 WBC
PLATELET # BLD AUTO: 118 K/UL (ref 164–446)
PMV BLD AUTO: 11.1 FL (ref 9–12.9)
RBC # BLD AUTO: 3.86 M/UL (ref 4.2–5.4)
VIT B12 SERPL-MCNC: 587 PG/ML (ref 211–911)
WBC # BLD AUTO: 3.3 K/UL (ref 4.8–10.8)

## 2021-04-19 PROCEDURE — 85025 COMPLETE CBC W/AUTO DIFF WBC: CPT

## 2021-04-19 PROCEDURE — 36415 COLL VENOUS BLD VENIPUNCTURE: CPT

## 2021-04-19 PROCEDURE — 82607 VITAMIN B-12: CPT

## 2021-04-19 PROCEDURE — 84207 ASSAY OF VITAMIN B-6: CPT

## 2021-04-22 LAB — VIT B6 SERPL-MCNC: 59.1 NMOL/L (ref 20–125)

## 2021-04-23 DIAGNOSIS — D70.9 NEUTROPENIA, UNSPECIFIED TYPE (HCC): ICD-10-CM

## 2021-04-23 DIAGNOSIS — D69.6 THROMBOCYTOPENIA (HCC): ICD-10-CM

## 2021-05-06 ENCOUNTER — HOSPITAL ENCOUNTER (OUTPATIENT)
Dept: LAB | Facility: MEDICAL CENTER | Age: 76
End: 2021-05-06
Attending: INTERNAL MEDICINE
Payer: MEDICARE

## 2021-05-06 ENCOUNTER — OFFICE VISIT (OUTPATIENT)
Dept: HEMATOLOGY ONCOLOGY | Facility: MEDICAL CENTER | Age: 76
End: 2021-05-06
Payer: MEDICARE

## 2021-05-06 VITALS
DIASTOLIC BLOOD PRESSURE: 74 MMHG | TEMPERATURE: 98 F | BODY MASS INDEX: 24.06 KG/M2 | HEIGHT: 65 IN | SYSTOLIC BLOOD PRESSURE: 118 MMHG | OXYGEN SATURATION: 94 % | WEIGHT: 144.4 LBS | HEART RATE: 94 BPM | RESPIRATION RATE: 16 BRPM

## 2021-05-06 DIAGNOSIS — D69.6 THROMBOCYTOPENIA (HCC): ICD-10-CM

## 2021-05-06 DIAGNOSIS — D75.89 MACROCYTOSIS: ICD-10-CM

## 2021-05-06 DIAGNOSIS — D70.9 NEUTROPENIA, UNSPECIFIED TYPE (HCC): ICD-10-CM

## 2021-05-06 LAB
ALBUMIN SERPL BCP-MCNC: 3.5 G/DL (ref 3.2–4.9)
ALBUMIN/GLOB SERPL: 1 G/DL
ALP SERPL-CCNC: 82 U/L (ref 30–99)
ALT SERPL-CCNC: 26 U/L (ref 2–50)
ANION GAP SERPL CALC-SCNC: 10 MMOL/L (ref 7–16)
AST SERPL-CCNC: 51 U/L (ref 12–45)
BILIRUB SERPL-MCNC: 0.4 MG/DL (ref 0.1–1.5)
BUN SERPL-MCNC: 19 MG/DL (ref 8–22)
CALCIUM SERPL-MCNC: 8.9 MG/DL (ref 8.5–10.5)
CHLORIDE SERPL-SCNC: 107 MMOL/L (ref 96–112)
CO2 SERPL-SCNC: 24 MMOL/L (ref 20–33)
CREAT SERPL-MCNC: 1.03 MG/DL (ref 0.5–1.4)
GLOBULIN SER CALC-MCNC: 3.4 G/DL (ref 1.9–3.5)
GLUCOSE SERPL-MCNC: 96 MG/DL (ref 65–99)
LDH SERPL L TO P-CCNC: 245 U/L (ref 107–266)
POTASSIUM SERPL-SCNC: 3.6 MMOL/L (ref 3.6–5.5)
PROT SERPL-MCNC: 6.9 G/DL (ref 6–8.2)
SODIUM SERPL-SCNC: 141 MMOL/L (ref 135–145)

## 2021-05-06 PROCEDURE — 99204 OFFICE O/P NEW MOD 45 MIN: CPT | Performed by: INTERNAL MEDICINE

## 2021-05-06 PROCEDURE — 80053 COMPREHEN METABOLIC PANEL: CPT

## 2021-05-06 PROCEDURE — 83615 LACTATE (LD) (LDH) ENZYME: CPT

## 2021-05-06 PROCEDURE — 81338 MPL GENE COMMON VARIANTS: CPT

## 2021-05-06 PROCEDURE — 81270 JAK2 GENE: CPT

## 2021-05-06 PROCEDURE — 81219 CALR GENE COM VARIANTS: CPT

## 2021-05-06 PROCEDURE — 36415 COLL VENOUS BLD VENIPUNCTURE: CPT

## 2021-05-06 ASSESSMENT — ENCOUNTER SYMPTOMS
COUGH: 0
DIARRHEA: 0
BRUISES/BLEEDS EASILY: 0
PHOTOPHOBIA: 0
MYALGIAS: 0
CONSTIPATION: 0
VOMITING: 0
WEIGHT LOSS: 0
FEVER: 0
SPUTUM PRODUCTION: 0
DIAPHORESIS: 0
BLURRED VISION: 0
CHILLS: 0
HEADACHES: 0
DIZZINESS: 0
SENSORY CHANGE: 0
NERVOUS/ANXIOUS: 0
WHEEZING: 0
TINGLING: 0
INSOMNIA: 0
SHORTNESS OF BREATH: 0
DOUBLE VISION: 0
DEPRESSION: 0
NAUSEA: 0

## 2021-05-06 ASSESSMENT — FIBROSIS 4 INDEX: FIB4 SCORE: 6.29

## 2021-05-06 ASSESSMENT — PATIENT HEALTH QUESTIONNAIRE - PHQ9: CLINICAL INTERPRETATION OF PHQ2 SCORE: 0

## 2021-05-06 NOTE — PROGRESS NOTES
Follow Up Note:  Hematology/Oncology      Primary Care:  [facility]    Diagnosis: Neutropenia    Chief Complaint: fatigue    History of Presenting Illness:  Evie Morillo is a 75 y.o. female noted to have progressive leukopenia at least since 2 months ago.  Last year her white count was 4.9 hemoglobin 13.2  and platelet count 125,000  The most current CBC performed 2 weeks ago show a white count of 3.3 hemoglobin 12.2 platelet count 118,000 with a neutrophil count of 2100.  Her B12 for level 2 weeks ago was 587 but was 278 1 year ago.  She was noted to have a B6 level of 10 a month ago after replacement was 59.1.      Interval history: n/a    Past Medical History:   Diagnosis Date   • AAA (abdominal aortic aneurysm) (HCC)     had surgery for this.   • CAD (coronary artery disease) 10/4/2012    2 stents [place] 2009    • Chronic insomnia 5/23/2016   • Depression 9/26/2013    not a current issue-not on medications   • Diverticula of colon    • Hyperlipidemia 10/4/2012   • Hypertension    • Hypothyroid 10/17/2013   • Postural hypotension 10/7/2014     IMO load March 2020   • Sleep apnea 05/22/2018    had CPAP at one time-not at using at this time   • TIA (transient ischemic attack) 10/04/2012    2001 (x2) and then one in 2003   • Tobacco abuse 4/20/2016       Allergies as of 05/06/2021 - Reviewed 05/06/2021   Allergen Reaction Noted   • Pcn [penicillins]  06/17/2016         Current Outpatient Medications:   •  levothyroxine (SYNTHROID) 88 MCG Tab, Take 1 tablet by mouth every morning on an empty stomach., Disp: 90 tablet, Rfl: 3  •  losartan (COZAAR) 100 MG Tab, Take 0.5 Tablets by mouth every day., Disp: 90 tablet, Rfl: 1  •  atorvastatin (LIPITOR) 80 MG tablet, Take 1 Tab by mouth every day., Disp: 90 Tab, Rfl: 3  •  traZODone (DESYREL) 150 MG Tab, TAKE ONE TABLET BY MOUTH TWICE A DAY, Disp: 180 Tab, Rfl: 2  •  spironolactone (ALDACTONE) 50 MG Tab, TAKE ONE TABLET BY MOUTH DAILY,  "Disp: 100 Tab, Rfl: 1  •  famotidine (PEPCID) 20 MG Tab, Take 1 Tab by mouth 2 times a day., Disp: 60 Tab, Rfl: 3  •  aspirin (ASA) 325 MG TABS, Take 325 mg by mouth every day., Disp: , Rfl:       Review of Systems:  Review of Systems   Constitutional: Positive for malaise/fatigue. Negative for chills, diaphoresis, fever and weight loss.   HENT: Negative for nosebleeds and tinnitus.    Eyes: Negative for blurred vision, double vision and photophobia.   Respiratory: Negative for cough, sputum production, shortness of breath and wheezing.    Cardiovascular: Negative for chest pain and leg swelling.   Gastrointestinal: Negative for constipation, diarrhea, nausea and vomiting.   Genitourinary: Negative for dysuria.   Musculoskeletal: Negative for joint pain and myalgias.   Skin: Negative for rash.   Neurological: Negative for dizziness, tingling, sensory change and headaches.   Endo/Heme/Allergies: Does not bruise/bleed easily.   Psychiatric/Behavioral: Negative for depression. The patient is not nervous/anxious and does not have insomnia.          Physical Exam:  Vitals:    05/06/21 1523   BP: 118/74   Pulse: 94   Resp: 16   Temp: 36.7 °C (98 °F)   TempSrc: Temporal   SpO2: 94%   Weight: 65.5 kg (144 lb 6.4 oz)   Height: 1.651 m (5' 5\")       Physical Exam  Vitals reviewed.   Constitutional:       General: She is not in acute distress.  HENT:      Head: Normocephalic.      Mouth/Throat:      Mouth: Mucous membranes are moist.   Eyes:      Extraocular Movements: Extraocular movements intact.      Pupils: Pupils are equal, round, and reactive to light.   Cardiovascular:      Rate and Rhythm: Normal rate and regular rhythm.      Pulses: Normal pulses.      Heart sounds: Normal heart sounds.   Pulmonary:      Effort: Pulmonary effort is normal.      Breath sounds: Normal breath sounds.   Abdominal:      General: Bowel sounds are normal.      Palpations: Abdomen is soft.      Tenderness: There is no abdominal tenderness. "   Musculoskeletal:         General: Normal range of motion.      Cervical back: Normal range of motion and neck supple.   Lymphadenopathy:      Head:      Right side of head: No submental, submandibular, tonsillar, preauricular, posterior auricular or occipital adenopathy.      Left side of head: No submental, submandibular, tonsillar, preauricular, posterior auricular or occipital adenopathy.      Cervical: No cervical adenopathy.      Right cervical: No superficial, deep or posterior cervical adenopathy.     Left cervical: No superficial, deep or posterior cervical adenopathy.      Upper Body:      Right upper body: No supraclavicular, axillary, pectoral or epitrochlear adenopathy.      Left upper body: No supraclavicular, axillary, pectoral or epitrochlear adenopathy.      Lower Body: No right inguinal adenopathy. No left inguinal adenopathy.   Skin:     General: Skin is warm and dry.   Neurological:      General: No focal deficit present.      Mental Status: She is alert and oriented to person, place, and time.   Psychiatric:         Mood and Affect: Mood normal.           Labs:  No visits with results within 1 Day(s) from this visit.   Latest known visit with results is:   Hospital Outpatient Visit on 04/19/2021   Component Date Value Ref Range Status   • Vitamin B6 04/19/2021 59.1  20.0 - 125.0 nmol/L Final    Comment: INTERPRETIVE INFORMATION: Vitamin B6 (Pyridoxal 5-Phosphate)  Pyridoxal 5'-phosphate measured in a specimen collected following  an 8-hour or overnight fast accurately indicates vitamin B6  nutritional status. Non-fasting specimen concentration reflects  recent vitamin intake.  This test was developed and its performance characteristics  determined by [facility]. It has not been cleared or  approved by the [place] Food and Drug Administration. This test was  performed in a CLIA certified laboratory and is intended for  clinical purposes.  Performed By: [facility]  [address] [place] [address]  [place] [phone]  : Tori Cedeno MD     • Vitamin B12 -True Cobalamin 04/19/2021 587  211 - 911 pg/mL Final   • WBC 04/19/2021 3.3* 4.8 - 10.8 K/uL Final   • RBC 04/19/2021 3.86* 4.20 - 5.40 M/uL Final   • Hemoglobin 04/19/2021 12.2  12.0 - 16.0 g/dL Final   • Hematocrit 04/19/2021 38.6  37.0 - 47.0 % Final   • MCV 04/19/2021 100.0* 81.4 - 97.8 fL Final   • MCH 04/19/2021 31.6  27.0 - 33.0 pg Final   • MCHC 04/19/2021 31.6* 33.6 - 35.0 g/dL Final   • RDW 04/19/2021 50.2* 35.9 - 50.0 fL Final   • Platelet Count 04/19/2021 118* 164 - 446 K/uL Final   • MPV 04/19/2021 11.1  9.0 - 12.9 fL Final   • Neutrophils-Polys 04/19/2021 63.70  44.00 - 72.00 % Final   • Lymphocytes 04/19/2021 24.80  22.00 - 41.00 % Final   • Monocytes 04/19/2021 8.80  0.00 - 13.40 % Final   • Eosinophils 04/19/2021 1.80  0.00 - 6.90 % Final   • Basophils 04/19/2021 0.60  0.00 - 1.80 % Final   • Immature Granulocytes 04/19/2021 0.30  0.00 - 0.90 % Final   • Nucleated RBC 04/19/2021 0.00  /100 WBC Final   • Neutrophils (Absolute) 04/19/2021 2.10  2.00 - 7.15 K/uL Final    Includes immature neutrophils, if present.   • Lymphs (Absolute) 04/19/2021 0.82* 1.00 - 4.80 K/uL Final   • Monos (Absolute) 04/19/2021 0.29  0.00 - 0.85 K/uL Final   • Eos (Absolute) 04/19/2021 0.06  0.00 - 0.51 K/uL Final   • Baso (Absolute) 04/19/2021 0.02  0.00 - 0.12 K/uL Final   • Immature Granulocytes (abs) 04/19/2021 0.01  0.00 - 0.11 K/uL Final   • NRBC (Absolute) 04/19/2021 0.00  K/uL Final       Imaging:   A CT angiogram done on 12th 31st of 2019 show a normal spleen size.  An abdominal aortic aneurysm of 5.1 cm a presence of diverticulosis      Assessment & Plan:  1. Thrombocytopenia (HCC)  CBC WITH DIFFERENTIAL    LDH    US-ABDOMEN COMPLETE SURVEY    JAK2 GENE MUT RFLX CALR RFLX MPL   2. Neutropenia, unspecified type (HCC)  US-ABDOMEN COMPLETE SURVEY    JAK2 GENE MUT RFLX CALR RFLX MPL   3. Macrocytosis  Comp Metabolic Panel       Rt 4  weeks  she agreed and verbalized her agreement and understanding with the current plan. I answered all questions and concerns she has at this time and advised her to call at any time in the interim with questions or concerns in regards to her care.       Please note that this dictation was created using voice recognition software. I have made every reasonable attempt to correct obvious errors, but I expect that there are errors of grammar and possibly content that I did not discover before finalizing the note.      Thank you for allowing me to participate in his care, I will continue to follow.

## 2021-05-12 LAB — JAK2 P.V617F BLD/T QL: NOT DETECTED

## 2021-05-16 LAB — GENE XXX MUT ANL BLD/T: NOT DETECTED

## 2021-05-17 LAB — MPL P.W515 BLD/T QL: NOT DETECTED

## 2021-05-28 ENCOUNTER — HOSPITAL ENCOUNTER (OUTPATIENT)
Dept: RADIOLOGY | Facility: MEDICAL CENTER | Age: 76
End: 2021-05-28
Attending: INTERNAL MEDICINE
Payer: MEDICARE

## 2021-05-28 DIAGNOSIS — D69.6 THROMBOCYTOPENIA (HCC): ICD-10-CM

## 2021-05-28 DIAGNOSIS — D70.9 NEUTROPENIA, UNSPECIFIED TYPE (HCC): ICD-10-CM

## 2021-05-28 PROCEDURE — 76705 ECHO EXAM OF ABDOMEN: CPT

## 2021-06-10 ENCOUNTER — APPOINTMENT (OUTPATIENT)
Dept: HEMATOLOGY ONCOLOGY | Facility: MEDICAL CENTER | Age: 76
End: 2021-06-10
Payer: MEDICARE

## 2021-06-16 ENCOUNTER — TELEPHONE (OUTPATIENT)
Dept: HEMATOLOGY ONCOLOGY | Facility: MEDICAL CENTER | Age: 76
End: 2021-06-16

## 2021-06-16 DIAGNOSIS — D69.6 THROMBOCYTOPENIA (HCC): ICD-10-CM

## 2021-06-16 DIAGNOSIS — D75.89 MACROCYTOSIS: ICD-10-CM

## 2021-06-16 DIAGNOSIS — D70.9 NEUTROPENIA, UNSPECIFIED TYPE (HCC): ICD-10-CM

## 2021-06-16 NOTE — TELEPHONE ENCOUNTER
Called PT LVM stating that I am cancelling her appt for 06/17/21 because Kyung is requesting multiple labs to be completed prior to her FV. I advised her to please call back to R/S her FV until after those are completed. They are ordered in the system.

## 2021-06-16 NOTE — PROGRESS NOTES
Will order other labs and asked patient to reschedule appointment and complete those prior to her follow-up visit.    1. Neutropenia, unspecified type (HCC)  CBC WITH DIFFERENTIAL    BIRDIE REFLEXIVE PROFILE   2. Thrombocytopenia (HCC)  CBC WITH DIFFERENTIAL   3. Macrocytosis  CBC WITH DIFFERENTIAL    FOLATE    LDH    RETICULOCYTES COUNT    SPEP W/REFLEX TO FELIPE MARTINEZ G, M

## 2021-06-24 ENCOUNTER — OFFICE VISIT (OUTPATIENT)
Dept: CARDIOLOGY | Facility: MEDICAL CENTER | Age: 76
End: 2021-06-24
Payer: MEDICARE

## 2021-06-24 VITALS
DIASTOLIC BLOOD PRESSURE: 76 MMHG | SYSTOLIC BLOOD PRESSURE: 136 MMHG | HEART RATE: 56 BPM | BODY MASS INDEX: 23.66 KG/M2 | HEIGHT: 65 IN | WEIGHT: 142 LBS | OXYGEN SATURATION: 96 %

## 2021-06-24 DIAGNOSIS — I71.20 THORACIC AORTIC ANEURYSM WITHOUT RUPTURE (HCC): ICD-10-CM

## 2021-06-24 DIAGNOSIS — R07.89 OTHER CHEST PAIN: ICD-10-CM

## 2021-06-24 DIAGNOSIS — I35.1 NONRHEUMATIC AORTIC VALVE INSUFFICIENCY: ICD-10-CM

## 2021-06-24 DIAGNOSIS — I25.10 CORONARY ARTERY DISEASE INVOLVING NATIVE CORONARY ARTERY OF NATIVE HEART WITHOUT ANGINA PECTORIS: ICD-10-CM

## 2021-06-24 DIAGNOSIS — I10 ESSENTIAL HYPERTENSION: ICD-10-CM

## 2021-06-24 PROCEDURE — 99214 OFFICE O/P EST MOD 30 MIN: CPT | Performed by: INTERNAL MEDICINE

## 2021-06-24 ASSESSMENT — FIBROSIS 4 INDEX: FIB4 SCORE: 6.61

## 2021-06-24 NOTE — PROGRESS NOTES
Chief Complaint   Patient presents with   • Coronary Artery Disease     follow up       Subjective:   Evie Morillo is a 76 y.o. female who presents today for follow-up of coronary artery disease hypertension as well as AAA status post endovascular repair followed by vascular surgery.  She also has residual 4.4 cm thoracic aortic aneurysm.  Been feeling well over the past year however noted sharp substernal chest discomfort radiating to her right arm which made her feel unwell.  It has not recurred.  This occurred about 2 weeks ago.  She continues to be mild to moderately active.  She has quit smoking for over a year.      Past Medical History:   Diagnosis Date   • AAA (abdominal aortic aneurysm) (HCC)     had surgery for this.   • CAD (coronary artery disease) 10/4/2012    2 stents [place] 2009    • Chronic insomnia 5/23/2016   • Depression 9/26/2013    not a current issue-not on medications   • Diverticula of colon    • Hyperlipidemia 10/4/2012   • Hypertension    • Hypothyroid 10/17/2013   • Postural hypotension 10/7/2014     IMO load March 2020   • Sleep apnea 05/22/2018    had CPAP at one time-not at using at this time   • TIA (transient ischemic attack) 10/04/2012    2001 (x2) and then one in 2003   • Tobacco abuse 4/20/2016     Past Surgical History:   Procedure Laterality Date   • STENT PLACEMENT  2008    x 2 Dr Can in [place]   • OTHER Left     plantar wart removed on left foot   • OTHER      throat polyps removed (non cancerous) > 20 years ago     Family History   Problem Relation Age of Onset   • Heart Disease Mother    • Heart Attack Mother    • Heart Disease Father    • Heart Attack Father    • Cancer Maternal Grandmother         stomach   • Diabetes Maternal Grandmother    • Cancer Maternal Grandfather         brain   • Stroke Paternal Grandmother      Social History     Socioeconomic History   • Marital status:      Spouse name: Not on file   • Number of children: Not on file   •  Years of education: Not on file   • Highest education level: Not on file   Occupational History   • Not on file   Tobacco Use   • Smoking status: Former Smoker     Packs/day: 0.25     Years: 40.00     Pack years: 10.00     Types: Cigarettes     Quit date: 2020     Years since quittin.3   • Smokeless tobacco: Never Used   Vaping Use   • Vaping Use: Never used   Substance and Sexual Activity   • Alcohol use: No   • Drug use: No   • Sexual activity: Never     Partners: Male   Other Topics Concern   • Not on file   Social History Narrative    On disability      Social Determinants of Health     Financial Resource Strain:    • Difficulty of Paying Living Expenses:    Food Insecurity:    • Worried About Running Out of Food in the Last Year:    • Ran Out of Food in the Last Year:    Transportation Needs:    • Lack of Transportation (Medical):    • Lack of Transportation (Non-Medical):    Physical Activity:    • Days of Exercise per Week:    • Minutes of Exercise per Session:    Stress:    • Feeling of Stress :    Social Connections:    • Frequency of Communication with Friends and Family:    • Frequency of Social Gatherings with Friends and Family:    • Attends Religion Services:    • Active Member of Clubs or Organizations:    • Attends Club or Organization Meetings:    • Marital Status:    Intimate Partner Violence:    • Fear of Current or Ex-Partner:    • Emotionally Abused:    • Physically Abused:    • Sexually Abused:      Allergies   Allergen Reactions   • Pcn [Penicillins]      Outpatient Encounter Medications as of 2021   Medication Sig Dispense Refill   • spironolactone (ALDACTONE) 50 MG Tab TAKE 1 TABLET BY MOUTH DAILY 100 tablet 2   • levothyroxine (SYNTHROID) 88 MCG Tab Take 1 tablet by mouth every morning on an empty stomach. 90 tablet 3   • losartan (COZAAR) 100 MG Tab Take 0.5 Tablets by mouth every day. 90 tablet 1   • atorvastatin (LIPITOR) 80 MG tablet Take 1 Tab by mouth every day. 90 Tab  "3   • traZODone (DESYREL) 150 MG Tab TAKE ONE TABLET BY MOUTH TWICE A  Tab 2   • aspirin (ASA) 325 MG TABS Take 325 mg by mouth every day.     • famotidine (PEPCID) 20 MG Tab Take 1 Tab by mouth 2 times a day. (Patient not taking: Reported on 6/24/2021) 60 Tab 3     No facility-administered encounter medications on file as of 6/24/2021.     Review of Systems   All other systems reviewed and are negative.       Objective:   /76 (BP Location: Left arm, Patient Position: Sitting)   Pulse (!) 56   Ht 1.651 m (5' 5\")   Wt 64.4 kg (142 lb)   SpO2 96%   BMI 23.63 kg/m²     Physical Exam   Constitutional: She is oriented to person, place, and time. She appears well-developed. No distress.   Poor dentition   HENT:   Head: Normocephalic and atraumatic.   Right Ear: External ear normal.   Left Ear: External ear normal.   Mouth/Throat: No oropharyngeal exudate.   Eyes: Pupils are equal, round, and reactive to light. Conjunctivae are normal. Right eye exhibits no discharge. Left eye exhibits no discharge. No scleral icterus.   Neck: No JVD present. No tracheal deviation present. No thyromegaly present.   Cardiovascular: Normal rate, regular rhythm, S1 normal, S2 normal and normal heart sounds. PMI is not displaced. Exam reveals no gallop, no S3, no S4 and no friction rub.   No murmur heard.  Pulses:       Carotid pulses are 2+ on the right side and 2+ on the left side.       Radial pulses are 2+ on the right side and 2+ on the left side.        Popliteal pulses are 2+ on the right side and 2+ on the left side.        Dorsalis pedis pulses are 2+ on the right side and 2+ on the left side.        Posterior tibial pulses are 2+ on the right side and 2+ on the left side.   Pulmonary/Chest: Effort normal and breath sounds normal. No respiratory distress. She has no wheezes. She has no rales. She exhibits no tenderness.   Abdominal: Soft. Bowel sounds are normal. She exhibits no distension. There is no abdominal " tenderness.   Musculoskeletal:         General: No tenderness. Normal range of motion.      Cervical back: Normal range of motion and neck supple.   Neurological: She is alert and oriented to person, place, and time. No cranial nerve deficit (Cranial nerves II through XII grossly intact).   Skin: Skin is warm and dry. No rash noted. She is not diaphoretic. No erythema.   Psychiatric: Her behavior is normal. Judgment and thought content normal.   Vitals reviewed.    LABS:  Lab Results   Component Value Date/Time    CHOLSTRLTOT 105 03/02/2021 09:28 AM    LDL 57 03/02/2021 09:28 AM    HDL 35 (A) 03/02/2021 09:28 AM    TRIGLYCERIDE 63 03/02/2021 09:28 AM       Lab Results   Component Value Date/Time    WBC 3.3 (L) 04/19/2021 01:40 PM    RBC 3.86 (L) 04/19/2021 01:40 PM    HEMOGLOBIN 12.2 04/19/2021 01:40 PM    HEMATOCRIT 38.6 04/19/2021 01:40 PM    .0 (H) 04/19/2021 01:40 PM    NEUTSPOLYS 63.70 04/19/2021 01:40 PM    LYMPHOCYTES 24.80 04/19/2021 01:40 PM    MONOCYTES 8.80 04/19/2021 01:40 PM    EOSINOPHILS 1.80 04/19/2021 01:40 PM    BASOPHILS 0.60 04/19/2021 01:40 PM     Lab Results   Component Value Date/Time    SODIUM 141 05/06/2021 03:59 PM    POTASSIUM 3.6 05/06/2021 03:59 PM    CHLORIDE 107 05/06/2021 03:59 PM    CO2 24 05/06/2021 03:59 PM    GLUCOSE 96 05/06/2021 03:59 PM    BUN 19 05/06/2021 03:59 PM    CREATININE 1.03 05/06/2021 03:59 PM         Lab Results   Component Value Date/Time    ALKPHOSPHAT 82 05/06/2021 03:59 PM    ASTSGOT 51 (H) 05/06/2021 03:59 PM    ALTSGPT 26 05/06/2021 03:59 PM    TBILIRUBIN 0.4 05/06/2021 03:59 PM      Lab Results   Component Value Date/Time    BNPBTYPENAT 37 05/07/2018 09:25 AM      No results found for: TSH  Lab Results   Component Value Date/Time    PROTHROMBTM 12.6 06/17/2016 02:26 PM    INR 0.94 06/17/2016 02:26 PM          Assessment:     1. Other chest pain  EC-ECHOCARDIOGRAM COMPLETE W/O CONT    NM-CARDIAC STRESS TEST   2. Thoracic aortic aneurysm without rupture  (HCC)  EC-ECHOCARDIOGRAM COMPLETE W/O CONT    NM-CARDIAC STRESS TEST   3. Nonrheumatic aortic valve insufficiency  EC-ECHOCARDIOGRAM COMPLETE W/O CONT   4. Essential hypertension     5. Coronary artery disease involving native coronary artery of native heart without angina pectoris         Medical Decision Making:  Today's Assessment / Status / Plan:     Maintaining tobacco cessation and she was congratulated on this.  Chest pain is atypical and may be pleurisy however her thoracic aorta and rapid progression of her previous abdominal aorta aneurysm recommend further evaluation as well as evaluation for underlying CAD.  Pharmacologic stress test MPI and echocardiogram.  If unremarkable follow-up yearly.

## 2021-06-29 ENCOUNTER — HOSPITAL ENCOUNTER (OUTPATIENT)
Dept: LAB | Facility: MEDICAL CENTER | Age: 76
End: 2021-06-29
Attending: NURSE PRACTITIONER
Payer: MEDICARE

## 2021-06-29 DIAGNOSIS — D69.6 THROMBOCYTOPENIA (HCC): ICD-10-CM

## 2021-06-29 DIAGNOSIS — D75.89 MACROCYTOSIS: ICD-10-CM

## 2021-06-29 DIAGNOSIS — D70.9 NEUTROPENIA, UNSPECIFIED TYPE (HCC): ICD-10-CM

## 2021-06-29 LAB
BASOPHILS # BLD AUTO: 0.6 % (ref 0–1.8)
BASOPHILS # BLD: 0.02 K/UL (ref 0–0.12)
EOSINOPHIL # BLD AUTO: 0.1 K/UL (ref 0–0.51)
EOSINOPHIL NFR BLD: 3.1 % (ref 0–6.9)
ERYTHROCYTE [DISTWIDTH] IN BLOOD BY AUTOMATED COUNT: 48.4 FL (ref 35.9–50)
FOLATE SERPL-MCNC: 14.2 NG/ML
HCT VFR BLD AUTO: 38.4 % (ref 37–47)
HGB BLD-MCNC: 12.3 G/DL (ref 12–16)
HGB RETIC QN AUTO: 36.5 PG/CELL (ref 29–35)
IMM GRANULOCYTES # BLD AUTO: 0.01 K/UL (ref 0–0.11)
IMM GRANULOCYTES NFR BLD AUTO: 0.3 % (ref 0–0.9)
IMM RETICS NFR: 12.9 % (ref 9.3–17.4)
LDH SERPL L TO P-CCNC: 226 U/L (ref 107–266)
LYMPHOCYTES # BLD AUTO: 0.66 K/UL (ref 1–4.8)
LYMPHOCYTES NFR BLD: 20.7 % (ref 22–41)
MCH RBC QN AUTO: 31.1 PG (ref 27–33)
MCHC RBC AUTO-ENTMCNC: 32 G/DL (ref 33.6–35)
MCV RBC AUTO: 97.2 FL (ref 81.4–97.8)
MONOCYTES # BLD AUTO: 0.22 K/UL (ref 0–0.85)
MONOCYTES NFR BLD AUTO: 6.9 % (ref 0–13.4)
NEUTROPHILS # BLD AUTO: 2.18 K/UL (ref 2–7.15)
NEUTROPHILS NFR BLD: 68.4 % (ref 44–72)
NRBC # BLD AUTO: 0 K/UL
NRBC BLD-RTO: 0 /100 WBC
PLATELET # BLD AUTO: 111 K/UL (ref 164–446)
PMV BLD AUTO: 11.1 FL (ref 9–12.9)
RBC # BLD AUTO: 3.95 M/UL (ref 4.2–5.4)
RETICS # AUTO: 0.05 M/UL (ref 0.04–0.06)
RETICS/RBC NFR: 1.3 % (ref 0.8–2.1)
WBC # BLD AUTO: 3.2 K/UL (ref 4.8–10.8)

## 2021-06-29 PROCEDURE — 86038 ANTINUCLEAR ANTIBODIES: CPT

## 2021-06-29 PROCEDURE — 86256 FLUORESCENT ANTIBODY TITER: CPT

## 2021-06-29 PROCEDURE — 85025 COMPLETE CBC W/AUTO DIFF WBC: CPT

## 2021-06-29 PROCEDURE — 82746 ASSAY OF FOLIC ACID SERUM: CPT

## 2021-06-29 PROCEDURE — 86225 DNA ANTIBODY NATIVE: CPT

## 2021-06-29 PROCEDURE — 86039 ANTINUCLEAR ANTIBODIES (ANA): CPT

## 2021-06-29 PROCEDURE — 85046 RETICYTE/HGB CONCENTRATE: CPT

## 2021-06-29 PROCEDURE — 36415 COLL VENOUS BLD VENIPUNCTURE: CPT

## 2021-06-29 PROCEDURE — 83615 LACTATE (LD) (LDH) ENZYME: CPT

## 2021-06-29 PROCEDURE — 86235 NUCLEAR ANTIGEN ANTIBODY: CPT | Mod: 91

## 2021-06-29 PROCEDURE — 84165 PROTEIN E-PHORESIS SERUM: CPT

## 2021-06-29 PROCEDURE — 84155 ASSAY OF PROTEIN SERUM: CPT

## 2021-07-01 LAB — NUCLEAR IGG SER QL IA: DETECTED

## 2021-07-03 LAB
ALBUMIN SERPL ELPH-MCNC: 3.45 G/DL (ref 3.75–5.01)
ALPHA1 GLOB SERPL ELPH-MCNC: 0.3 G/DL (ref 0.19–0.46)
ALPHA2 GLOB SERPL ELPH-MCNC: 0.69 G/DL (ref 0.48–1.05)
ANA INTERPRETIVE COMMENT Q5143: ABNORMAL
ANA PATTERN Q5144: ABNORMAL
ANA TITER Q5145: ABNORMAL
ANTINUCLEAR ANTIBODY (ANA), HEP-2, IGG Q5142: DETECTED
B-GLOBULIN SERPL ELPH-MCNC: 0.88 G/DL (ref 0.48–1.1)
CYTOPLASMIC PATTERN TITER Q5148: ABNORMAL
DSDNA AB TITR SER CLIF: 140 IU (ref 0–24)
GAMMA GLOB SERPL ELPH-MCNC: 1.49 G/DL (ref 0.62–1.51)
INTERPRETATION SERPL IFE-IMP: ABNORMAL
MONOCLON BAND OBS SERPL: ABNORMAL
PATHOLOGY STUDY: ABNORMAL
PROT SERPL-MCNC: 6.8 G/DL (ref 6.3–8.2)

## 2021-07-04 LAB — DSDNA IGG TITR SER CLIF: ABNORMAL {TITER}

## 2021-07-05 LAB
ENA JO1 AB TITR SER: 1 AU/ML (ref 0–40)
ENA SCL70 IGG SER QL: 2 AU/ML (ref 0–40)
ENA SM IGG SER-ACNC: 0 AU/ML (ref 0–40)
ENA SS-B IGG SER IA-ACNC: 3 AU/ML (ref 0–40)
SSA52 R0ENA AB IGG Q0420: 1 AU/ML (ref 0–40)
SSA60 R0ENA AB IGG Q0419: 0 AU/ML (ref 0–40)

## 2021-07-06 ENCOUNTER — OFFICE VISIT (OUTPATIENT)
Dept: HEMATOLOGY ONCOLOGY | Facility: MEDICAL CENTER | Age: 76
End: 2021-07-06
Payer: MEDICARE

## 2021-07-06 VITALS
HEART RATE: 83 BPM | TEMPERATURE: 98.8 F | SYSTOLIC BLOOD PRESSURE: 110 MMHG | BODY MASS INDEX: 23.07 KG/M2 | WEIGHT: 138.45 LBS | OXYGEN SATURATION: 93 % | DIASTOLIC BLOOD PRESSURE: 68 MMHG | HEIGHT: 65 IN | RESPIRATION RATE: 18 BRPM

## 2021-07-06 DIAGNOSIS — D70.9 NEUTROPENIA, UNSPECIFIED TYPE (HCC): ICD-10-CM

## 2021-07-06 DIAGNOSIS — R76.8 POSITIVE ANA (ANTINUCLEAR ANTIBODY): ICD-10-CM

## 2021-07-06 DIAGNOSIS — D75.89 MACROCYTOSIS: ICD-10-CM

## 2021-07-06 DIAGNOSIS — D69.6 THROMBOCYTOPENIA (HCC): ICD-10-CM

## 2021-07-06 PROCEDURE — 99214 OFFICE O/P EST MOD 30 MIN: CPT | Performed by: NURSE PRACTITIONER

## 2021-07-06 ASSESSMENT — ENCOUNTER SYMPTOMS
DIARRHEA: 0
WEIGHT LOSS: 1
VOMITING: 0
FEVER: 0
NAUSEA: 0
BRUISES/BLEEDS EASILY: 1
COUGH: 0
SHORTNESS OF BREATH: 1
CHILLS: 0
DIZZINESS: 0
HEADACHES: 1
WHEEZING: 0
CONSTIPATION: 0
PALPITATIONS: 1

## 2021-07-06 ASSESSMENT — PAIN SCALES - GENERAL: PAINLEVEL: NO PAIN

## 2021-07-06 ASSESSMENT — FIBROSIS 4 INDEX: FIB4 SCORE: 7.03

## 2021-07-06 NOTE — PROGRESS NOTES
Subjective:      Ibeth Morillo is a 76 y.o. female who presents with Other (EST/Thrombocytopenia/1 mth fv/labs Prior)          HPI    Patient seen today in follow-up for thrombocytopenia and neutropenia.  She presents unaccompanied for today's visit.    Patient initially established with our office with [facility] physician, Dr. Berumen in May 2020 for neutropenia.  Lowest ANC was 1930.  However patient also noted to have leukopenia and thrombocytopenia during evaluation with Dr. Berumen.  Labs completed by Dr. Berumen yielded a negative JAK2 mutation, MPL and DANISHA R mutation.  LDH was within normal limits.  Ultrasound of the spleen on 5/28/2021 noted spleen to be the upper limits of normal in size.  Patient presents today to review results.    Patient also noted to have macrocytosis on previous CBC.  Macrocytosis does date back to September 2019.  I requested patient complete further labs as well to work-up the macrocytosis.  Most recent labs do continue to show leukopenia with a white blood cell count of 3.2.    MCV is within normal limits on this lab result.  Platelet count does continue to trend down now at 111k.  Reticulocyte count was within normal limits.  LDH continue to be at normal limits.  Previous vitamin B12 and TSH were within normal limits.  Folic acid came back unremarkable as well.  SPEP showed a normal SPEP pattern.  However BIRDIE lab came back detected with multiple abnormal findings on the reflex testing.    I personally reviewed the lab results in detail and ultrasound results in detail with patient today.  She continues to feel that her normal state of health.  She does note continued fatigue but is very active around her home.  She does have some weight loss stating she does not have much of an appetite.  She does bruise easily and notes nosebleeds when she blows her nose.  She tends to get some shortness of breath which is mild, and she stated this started when she stopped smoking back in February  2020.  She does note some chest pain and heart palpitations but is followed closely by cardiology and is due to have a stress test in ultrasound in the near future.  She does complain of some joint pain in her fingers and knees.  She does note some headaches and dizziness at times, which she equates to being on her iPad looking at the screen often.    Allergies   Allergen Reactions   • Pcn [Penicillins]      Current Outpatient Medications on File Prior to Visit   Medication Sig Dispense Refill   • spironolactone (ALDACTONE) 50 MG Tab TAKE 1 TABLET BY MOUTH DAILY 100 tablet 2   • levothyroxine (SYNTHROID) 88 MCG Tab Take 1 tablet by mouth every morning on an empty stomach. 90 tablet 3   • losartan (COZAAR) 100 MG Tab Take 0.5 Tablets by mouth every day. 90 tablet 1   • atorvastatin (LIPITOR) 80 MG tablet Take 1 Tab by mouth every day. 90 Tab 3   • traZODone (DESYREL) 150 MG Tab TAKE ONE TABLET BY MOUTH TWICE A  Tab 2   • aspirin (ASA) 325 MG TABS Take 325 mg by mouth every day.       No current facility-administered medications on file prior to visit.         Review of Systems   Constitutional: Positive for malaise/fatigue (gets up early every morning and is very active with keeping her house updated and clean) and weight loss (no appetite). Negative for chills and fever.   HENT: Positive for nosebleeds (notes when she blows her nose - thought because air is dry).    Respiratory: Positive for shortness of breath (mild - started when she stopped smoking (Feb 2020)). Negative for cough and wheezing.    Cardiovascular: Positive for chest pain and palpitations.        Stress test and U/S with cardiology   Gastrointestinal: Negative for constipation (mild at times and this is chronic), diarrhea, nausea and vomiting.   Genitourinary: Negative for dysuria.   Musculoskeletal: Positive for joint pain (fingers and knees).   Neurological: Positive for headaches (on ipad often - resolves when not looking at the screen).  "Negative for dizziness.   Endo/Heme/Allergies: Bruises/bleeds easily.          Objective:     /68   Pulse 83   Temp 37.1 °C (98.8 °F) (Temporal)   Resp 18   Ht 1.651 m (5' 5\")   Wt 62.8 kg (138 lb 7.2 oz)   SpO2 93%   BMI 23.04 kg/m²      Physical Exam  Vitals reviewed.   Constitutional:       General: She is not in acute distress.     Appearance: Normal appearance. She is not diaphoretic.   HENT:      Head: Normocephalic and atraumatic.   Cardiovascular:      Rate and Rhythm: Normal rate and regular rhythm.      Pulses: Normal pulses.      Heart sounds: Normal heart sounds. No murmur heard.   No friction rub. No gallop.    Pulmonary:      Effort: Pulmonary effort is normal. No respiratory distress.      Breath sounds: Normal breath sounds. No wheezing.   Musculoskeletal:         General: No swelling or tenderness. Normal range of motion.   Skin:     General: Skin is warm and dry.   Neurological:      Mental Status: She is alert and oriented to person, place, and time.   Psychiatric:         Mood and Affect: Mood normal.         Behavior: Behavior normal.         Results for VERN GUZMAN (MRN [id number])    Ref. Range 3/9/2021 09:56 4/19/2021 13:40 6/29/2021 10:07   WBC Latest Ref Range: 4.8 - 10.8 K/uL 3.1 (L) 3.3 (L) 3.2 (L)   RBC Latest Ref Range: 4.20 - 5.40 M/uL 3.84 (L) 3.86 (L) 3.95 (L)   Hemoglobin Latest Ref Range: 12.0 - 16.0 g/dL 12.2 12.2 12.3   Hematocrit Latest Ref Range: 37.0 - 47.0 % 39.0 38.6 38.4   MCV Latest Ref Range: 81.4 - 97.8 fL 101.6 (H) 100.0 (H) 97.2   MCH Latest Ref Range: 27.0 - 33.0 pg 31.8 31.6 31.1   MCHC Latest Ref Range: 33.6 - 35.0 g/dL 31.3 (L) 31.6 (L) 32.0 (L)   RDW Latest Ref Range: 35.9 - 50.0 fL 52.1 (H) 50.2 (H) 48.4   Platelet Count Latest Ref Range: 164 - 446 K/uL 120 (L) 118 (L) 111 (L)   MPV Latest Ref Range: 9.0 - 12.9 fL 11.9 11.1 11.1   Neutrophils-Polys Latest Ref Range: 44.00 - 72.00 % 62.60 63.70 68.40   Neutrophils (Absolute) Latest Ref Range: 2.00 - " 7.15 K/uL 1.93 (L) 2.10 2.18   Lymphocytes Latest Ref Range: 22.00 - 41.00 % 25.20 24.80 20.70 (L)   Lymphs (Absolute) Latest Ref Range: 1.00 - 4.80 K/uL 0.78 (L) 0.82 (L) 0.66 (L)   Monocytes Latest Ref Range: 0.00 - 13.40 % 8.40 8.80 6.90   Monos (Absolute) Latest Ref Range: 0.00 - 0.85 K/uL 0.26 0.29 0.22   Eosinophils Latest Ref Range: 0.00 - 6.90 % 3.20 1.80 3.10   Eos (Absolute) Latest Ref Range: 0.00 - 0.51 K/uL 0.10 0.06 0.10   Basophils Latest Ref Range: 0.00 - 1.80 % 0.30 0.60 0.60   Baso (Absolute) Latest Ref Range: 0.00 - 0.12 K/uL 0.01 0.02 0.02   Immature Granulocytes Latest Ref Range: 0.00 - 0.90 % 0.30 0.30 0.30   Immature Granulocytes (abs) Latest Ref Range: 0.00 - 0.11 K/uL 0.01 0.01 0.01   Nucleated RBC Latest Units: /100 WBC 0.00 0.00 0.00   NRBC (Absolute) Latest Units: K/uL 0.00 0.00 0.00       Results for VERN GUZMAN (MRN [id number])    Ref. Range 5/6/2021 15:59   CALR Exon 9 Mutation Analysis - Result Unknown Not Detected   MPL codon 515 results Unknown Not Detected     Results for VERN GUZMAN (MRN [id number])    Ref. Range 6/29/2021 10:07   Reticulocyte Count Latest Ref Range: 0.8 - 2.1 % 1.3   Retic, Absolute Latest Ref Range: 0.04 - 0.06 M/uL 0.05   Imm. Reticulocyte Fraction Latest Ref Range: 9.3 - 17.4 % 12.9   Retic Hgb Equivalent Latest Ref Range: 29.0 - 35.0 pg/cell 36.5 (H)        Results for VERN GUZMAN (MRN [id number])   Ref. Range 5/6/2021 15:59   Sodium Latest Ref Range: 135 - 145 mmol/L 141   Potassium Latest Ref Range: 3.6 - 5.5 mmol/L 3.6   Chloride Latest Ref Range: 96 - 112 mmol/L 107   Co2 Latest Ref Range: 20 - 33 mmol/L 24   Anion Gap Latest Ref Range: 7.0 - 16.0  10.0   Glucose Latest Ref Range: 65 - 99 mg/dL 96   Bun Latest Ref Range: 8 - 22 mg/dL 19   Creatinine Latest Ref Range: 0.50 - 1.40 mg/dL 1.03   GFR If  Latest Ref Range: >60 mL/min/1.73 m 2 >60   GFR If Non  Latest Ref Range: >60 mL/min/1.73 m 2 52 (A)   Calcium Latest Ref  Range: 8.5 - 10.5 mg/dL 8.9   AST(SGOT) Latest Ref Range: 12 - 45 U/L 51 (H)   ALT(SGPT) Latest Ref Range: 2 - 50 U/L 26   Alkaline Phosphatase Latest Ref Range: 30 - 99 U/L 82   Total Bilirubin Latest Ref Range: 0.1 - 1.5 mg/dL 0.4   Albumin Latest Ref Range: 3.2 - 4.9 g/dL 3.5   Total Protein Latest Ref Range: 6.0 - 8.2 g/dL 6.9   Globulin Latest Ref Range: 1.9 - 3.5 g/dL 3.4   JAK2 Gene, [id number] Mutation Unknown Not Detected   A-G Ratio Latest Units: g/dL 1.0     Results for VERN GUZMAN (MRN [id number])    Ref. Range 4/19/2021 13:40 6/29/2021 10:07   Folate -Folic Acid Latest Ref Range: >4.0 ng/mL  14.2   Vitamin B12 -True Cobalamin Latest Ref Range: 211 - 911 pg/mL 587    Vitamin B6 Latest Ref Range: 20.0 - 125.0 nmol/L 59.1      Results for VERN GUZMAN (MRN [id number])   Ref. Range 5/6/2021 15:59 6/29/2021 10:07   LDH Total Latest Ref Range: 107 - 266 U/L 245 226              Results for VERN GUZMAN (MRN [id number])    Ref. Range 6/29/2021 10:07   Anti-Dna -Ds Latest Ref Range: 0 - 24  (H)   Anti-Scl-70 Latest Ref Range: 0 - 40 AU/mL 2   Rita-1 Antibody, IgG Latest Ref Range: 0 - 40 AU/mL 1   BIRDIE Interpretive Comment Unknown See Note   BIRDIE Pattern Unknown Homogeneous (A)   Antinuclear Antibody Latest Ref Range: None Detected  Detected (A)   Smith Antibodies Latest Ref Range: 0 - 40 AU/mL 0   SSA 52 (R0)(ANGELA) Ab, IgG Latest Ref Range: 0 - 40 AU/mL 1   SSA 60 (R0)(ANGELA) Ab, IgG Latest Ref Range: 0 - 40 AU/mL 0   Sjogren'S Anti-Ss-B Latest Ref Range: 0 - 40 AU/mL 3   Cytoplasmic Pattern Titer Unknown 1:1280 (A)   BIRDIE Titer Unknown 1:2560 (A)   Dble Strand DNA Ab Titer Latest Ref Range: <1:10  1:160 (H)         Assessment/Plan:       1. Neutropenia, unspecified type (HCC)     2. Thrombocytopenia (HCC)     3. Macrocytosis     4. Positive BIRDIE (antinuclear antibody)  REFERRAL TO RHEUMATOLOGY       1.  Patient noted to have one-time CBC noting neutropenia at 1930, however she does have leukopenia dating back to  2017, and a one-time low WBC in 2013.  Therefore BIRDIE lab was drawn which was found to be positive with multiple abnormal reflex lab results.  Therefore I will go ahead and refer patient urgently to rheumatology for further discussion and evaluation of these lab results.    2.  Patient noted to have macrocytosis which appears to date back to 2019.  All labs completed for her macrocytosis without anemia are within normal limits, and her most recent CBC notes a normal MCV at this time.    3.  Patient noted to have thrombocytopenia which appears to be dated back to 2017.  It appears that her platelet count has trended down over the last 4 years along with slightly decrease in her WBC.  I did discuss the case in detail with Dr. Lemos and at this time my recommendation is to proceed with the bone marrow biopsy for further evaluation.  Patient did verbalize understanding and is in agreement with the plan to proceed with bone marrow biopsy.    4.  Patient to follow-up in the clinic to review the results of the bone marrow biopsy and discuss any further plan of care if needed.      Please note that this dictation was created using voice recognition software. I have made every reasonable attempt to correct obvious errors, but I expect that there are errors of grammar and possibly content that I did not discover before finalizing the note.

## 2021-07-07 ENCOUNTER — TELEPHONE (OUTPATIENT)
Dept: MEDICAL GROUP | Facility: MEDICAL CENTER | Age: 76
End: 2021-07-07

## 2021-07-07 LAB — U1 SNRNP IGG SER QL: NORMAL

## 2021-07-08 RX ORDER — LOSARTAN POTASSIUM 100 MG/1
50 TABLET ORAL DAILY
Qty: 90 TABLET | Refills: 1 | Status: SHIPPED | OUTPATIENT
Start: 2021-07-08 | End: 2022-04-20

## 2021-07-12 ENCOUNTER — PRE-ADMISSION TESTING (OUTPATIENT)
Dept: ADMISSIONS | Facility: MEDICAL CENTER | Age: 76
End: 2021-07-12
Attending: INTERNAL MEDICINE
Payer: MEDICARE

## 2021-07-13 ENCOUNTER — TELEPHONE (OUTPATIENT)
Dept: HEMATOLOGY ONCOLOGY | Facility: MEDICAL CENTER | Age: 76
End: 2021-07-13

## 2021-07-13 NOTE — TELEPHONE ENCOUNTER
Joellen called to report that patient has been rescheduled from 11:30 a.m. tomorrow to 10:30 a.m. tomorrow.

## 2021-07-14 ENCOUNTER — HOSPITAL ENCOUNTER (OUTPATIENT)
Facility: MEDICAL CENTER | Age: 76
End: 2021-07-14
Attending: INTERNAL MEDICINE | Admitting: INTERNAL MEDICINE
Payer: MEDICARE

## 2021-07-14 ENCOUNTER — TELEPHONE (OUTPATIENT)
Dept: HEMATOLOGY ONCOLOGY | Facility: MEDICAL CENTER | Age: 76
End: 2021-07-14

## 2021-07-14 VITALS
TEMPERATURE: 98 F | WEIGHT: 138.89 LBS | HEART RATE: 58 BPM | SYSTOLIC BLOOD PRESSURE: 137 MMHG | BODY MASS INDEX: 22.32 KG/M2 | DIASTOLIC BLOOD PRESSURE: 76 MMHG | RESPIRATION RATE: 14 BRPM | OXYGEN SATURATION: 92 % | HEIGHT: 66 IN

## 2021-07-14 DIAGNOSIS — D70.9 NEUTROPENIA, UNSPECIFIED TYPE (HCC): ICD-10-CM

## 2021-07-14 LAB
BASOPHILS # BLD AUTO: 0.3 % (ref 0–1.8)
BASOPHILS # BLD: 0.01 K/UL (ref 0–0.12)
EOSINOPHIL # BLD AUTO: 0.11 K/UL (ref 0–0.51)
EOSINOPHIL NFR BLD: 3 % (ref 0–6.9)
ERYTHROCYTE [DISTWIDTH] IN BLOOD BY AUTOMATED COUNT: 49.1 FL (ref 35.9–50)
HCT VFR BLD AUTO: 37.7 % (ref 37–47)
HGB BLD-MCNC: 12 G/DL (ref 12–16)
HGB RETIC QN AUTO: 36.5 PG/CELL (ref 29–35)
IMM GRANULOCYTES # BLD AUTO: 0.01 K/UL (ref 0–0.11)
IMM GRANULOCYTES NFR BLD AUTO: 0.3 % (ref 0–0.9)
IMM RETICS NFR: 7.9 % (ref 9.3–17.4)
LYMPHOCYTES # BLD AUTO: 0.65 K/UL (ref 1–4.8)
LYMPHOCYTES NFR BLD: 17.8 % (ref 22–41)
MCH RBC QN AUTO: 31.1 PG (ref 27–33)
MCHC RBC AUTO-ENTMCNC: 31.8 G/DL (ref 33.6–35)
MCV RBC AUTO: 97.7 FL (ref 81.4–97.8)
MONOCYTES # BLD AUTO: 0.35 K/UL (ref 0–0.85)
MONOCYTES NFR BLD AUTO: 9.6 % (ref 0–13.4)
NEUTROPHILS # BLD AUTO: 2.53 K/UL (ref 2–7.15)
NEUTROPHILS NFR BLD: 69 % (ref 44–72)
NRBC # BLD AUTO: 0 K/UL
NRBC BLD-RTO: 0 /100 WBC
PATHOLOGY CONSULT NOTE: NORMAL
PLATELET # BLD AUTO: 115 K/UL (ref 164–446)
PMV BLD AUTO: 10.6 FL (ref 9–12.9)
RBC # BLD AUTO: 3.86 M/UL (ref 4.2–5.4)
RETICS # AUTO: 0.05 M/UL (ref 0.04–0.06)
RETICS/RBC NFR: 1.2 % (ref 0.8–2.1)
WBC # BLD AUTO: 3.7 K/UL (ref 4.8–10.8)

## 2021-07-14 PROCEDURE — 88311 DECALCIFY TISSUE: CPT

## 2021-07-14 PROCEDURE — 88360 TUMOR IMMUNOHISTOCHEM/MANUAL: CPT

## 2021-07-14 PROCEDURE — 85046 RETICYTE/HGB CONCENTRATE: CPT

## 2021-07-14 PROCEDURE — 160048 HCHG OR STATISTICAL LEVEL 1-5: Performed by: HOSPITALIST

## 2021-07-14 PROCEDURE — 160038 HCHG SURGERY MINUTES - EA ADDL 1 MIN LEVEL 2: Performed by: HOSPITALIST

## 2021-07-14 PROCEDURE — 85025 COMPLETE CBC W/AUTO DIFF WBC: CPT

## 2021-07-14 PROCEDURE — 700111 HCHG RX REV CODE 636 W/ 250 OVERRIDE (IP): Performed by: HOSPITALIST

## 2021-07-14 PROCEDURE — 88341 IMHCHEM/IMCYTCHM EA ADD ANTB: CPT | Mod: XU

## 2021-07-14 PROCEDURE — 160027 HCHG SURGERY MINUTES - 1ST 30 MINS LEVEL 2: Performed by: HOSPITALIST

## 2021-07-14 PROCEDURE — 160025 RECOVERY II MINUTES (STATS): Performed by: HOSPITALIST

## 2021-07-14 PROCEDURE — 99152 MOD SED SAME PHYS/QHP 5/>YRS: CPT | Performed by: HOSPITALIST

## 2021-07-14 PROCEDURE — 160002 HCHG RECOVERY MINUTES (STAT): Performed by: HOSPITALIST

## 2021-07-14 PROCEDURE — 38222 DX BONE MARROW BX & ASPIR: CPT | Performed by: HOSPITALIST

## 2021-07-14 PROCEDURE — 88185 FLOWCYTOMETRY/TC ADD-ON: CPT | Mod: 91

## 2021-07-14 PROCEDURE — 88184 FLOWCYTOMETRY/ TC 1 MARKER: CPT

## 2021-07-14 PROCEDURE — 88264 CHROMOSOME ANALYSIS 20-25: CPT

## 2021-07-14 PROCEDURE — 160035 HCHG PACU - 1ST 60 MINS PHASE I: Performed by: HOSPITALIST

## 2021-07-14 PROCEDURE — 700111 HCHG RX REV CODE 636 W/ 250 OVERRIDE (IP)

## 2021-07-14 PROCEDURE — 160046 HCHG PACU - 1ST 60 MINS PHASE II: Performed by: HOSPITALIST

## 2021-07-14 PROCEDURE — 160047 HCHG PACU  - EA ADDL 30 MINS PHASE II: Performed by: HOSPITALIST

## 2021-07-14 PROCEDURE — 88313 SPECIAL STAINS GROUP 2: CPT

## 2021-07-14 PROCEDURE — 88342 IMHCHEM/IMCYTCHM 1ST ANTB: CPT

## 2021-07-14 PROCEDURE — 88305 TISSUE EXAM BY PATHOLOGIST: CPT | Mod: 59

## 2021-07-14 PROCEDURE — 88237 TISSUE CULTURE BONE MARROW: CPT

## 2021-07-14 RX ORDER — MIDAZOLAM HYDROCHLORIDE 1 MG/ML
.5-2 INJECTION INTRAMUSCULAR; INTRAVENOUS PRN
Status: DISCONTINUED | OUTPATIENT
Start: 2021-07-14 | End: 2021-07-14 | Stop reason: HOSPADM

## 2021-07-14 RX ORDER — MIDAZOLAM HYDROCHLORIDE 1 MG/ML
INJECTION INTRAMUSCULAR; INTRAVENOUS
Status: COMPLETED
Start: 2021-07-14 | End: 2021-07-14

## 2021-07-14 RX ORDER — SODIUM CHLORIDE 9 MG/ML
500 INJECTION, SOLUTION INTRAVENOUS
Status: DISCONTINUED | OUTPATIENT
Start: 2021-07-14 | End: 2021-07-14 | Stop reason: HOSPADM

## 2021-07-14 RX ADMIN — MIDAZOLAM HYDROCHLORIDE 1 MG: 1 INJECTION, SOLUTION INTRAMUSCULAR; INTRAVENOUS at 10:51

## 2021-07-14 RX ADMIN — FENTANYL CITRATE 50 MCG: 50 INJECTION, SOLUTION INTRAMUSCULAR; INTRAVENOUS at 10:57

## 2021-07-14 RX ADMIN — FENTANYL CITRATE 50 MCG: 50 INJECTION, SOLUTION INTRAMUSCULAR; INTRAVENOUS at 11:02

## 2021-07-14 RX ADMIN — MIDAZOLAM HYDROCHLORIDE 1 MG: 1 INJECTION INTRAMUSCULAR; INTRAVENOUS at 10:53

## 2021-07-14 RX ADMIN — MIDAZOLAM HYDROCHLORIDE 1 MG: 1 INJECTION INTRAMUSCULAR; INTRAVENOUS at 10:51

## 2021-07-14 RX ADMIN — FENTANYL CITRATE 50 MCG: 50 INJECTION, SOLUTION INTRAMUSCULAR; INTRAVENOUS at 10:51

## 2021-07-14 RX ADMIN — MIDAZOLAM HYDROCHLORIDE 1 MG: 1 INJECTION, SOLUTION INTRAMUSCULAR; INTRAVENOUS at 10:53

## 2021-07-14 ASSESSMENT — FIBROSIS 4 INDEX: FIB4 SCORE: 7.03

## 2021-07-14 NOTE — OR NURSING
1155: assumed care from IVANA Jordan    1200: weaned pt to room air. Pt sitting up at edge of bed. Bandaid x2 with scant blood noted. Pt reports mild soreness, but is able to mo    1209: discharge instructions given to spouse Cheng via telephone. ETA 10 minutes.    1220: PIV removed with tip intact    1226: pt discharged home instable condition. bandaids x2 unchanged with old scant blood noted. Down to  area via wheelchair accompanied by GARCÍA Hatfield. All belongings taken.

## 2021-07-14 NOTE — DISCHARGE INSTRUCTIONS
BONE MARROW ASPIRATION & BIOPSY DISCHARGE INSTRUCTIONS    1. After the numbing medicine wears off, you may feel some discomfort.    2. Keep bandage clean and dry for 24 hours.  After this time, you can change the bandage.  You may now bathe or shower.    3. If bleeding occurs after your bone marrow aspiration or biopsy, apply pressure to the area and call your doctor immediately.    4. Call your doctor if pain persists for greater than 24 hours in the area where you had your aspiration or biopsy.    5. Call your doctor immediately if you notice redness or drainage in the area or if you have a fever.    6. Call your doctor if you have numbness or weakness in the area where the doctor took the bone marrow or down your leg.    7. Do not drive or drink alcohol for 24 hours if you have had sedation medication.  The medication will make you drowsy.    8. Resume your regular diet.    9. Follow up with your doctor. You will be called with biopsy results in a couple weeks

## 2021-07-14 NOTE — TELEPHONE ENCOUNTER
"Rec'vd call from pt with c/o BMB site from today with continuous bleeding.  Pt reports the bleeding \"has been really bad.\"  RN asked pt to describe the amount of blood that has been lost & how many bandages have been used.  Pt unsure how much blood in quantity, so RN asked if the bandage was approx the size of a post-it note & pt replied yes.  Pt then reported that the bleeding had stopped after putting ice on it.  Pt states that the bleeding had only saturated thru the original dressing & was now placing a 2nd on the site of the same size.  Advised the pt to continue to place gentle to moderate pressure over the bleeding site while covered in a bandage & ice if that continues to help but for only for a short period of time (about 10-15 min).  Informed pt of ED/UC precautions if bleeding does not stop, but pt stated it had already stopped.  Pt verbalized understanding & stated no further questions.  "

## 2021-07-14 NOTE — PROCEDURES
Bone Marrow Biopsy/Aspiration    Date/Time: 7/14/2021 11:10 AM  Performed by: Jin Conrad M.D.  Authorized by: Jin Conrad M.D.     Consent:     Consent obtained:  Written    Consent given by:  Patient    Risks discussed:  Bleeding, pain and repeat procedure    Alternatives discussed:  No treatment  Universal protocol:     Procedure explained and questions answered to patient or proxy's satisfaction: yes      Relevant documents present and verified: yes      Test results available and properly labeled: yes      Imaging studies available: yes      Required blood products, implants, devices, and special equipment available: yes      Immediately prior to procedure a time out was called: yes      Site/side marked: yes      Patient identity confirmed:  Hospital-assigned identification number and verbally with patient  Pre-procedure details:     Procedure type:  Aspiration and biopsy    Requesting physician:  Canelo    Indications:  Neutropenia     Position:  Prone    Buttock laterality:  Right    Local anesthetic:  1% Lidocaine    Subcutaneous volume:  1 mL    Periosteum anesthetic volume:  4 mL    Preparation: Patient was prepped and draped in usual sterile fashion    Sedation:     Patient Sedated: Yes      Sedation type: moderate (conscious) sedation      Sedation:  Midazolam    Analgesia:  Fentanyl    Sedation length:  15 minutes  Procedure details:     Aspirate obtained:  5 mL followed by 5 mL    Biopsy performed:  2 cores    Number of attempts:  4+  Post-procedure:     Puncture site:  Adhesive bandage applied  Comments:      The left side was aspirated but only a very small piece of marrow and multiple dry-taps.  The right side was used for the biopsy.  She received 2 mg IV versed and 150 mcg IV fentanyl for conscious sedation which was initiated at 10:51 and the procedure finished at 11:09  I monitored her due to conscious sedation until 11:15.

## 2021-07-16 ENCOUNTER — HOSPITAL ENCOUNTER (OUTPATIENT)
Dept: RADIOLOGY | Facility: MEDICAL CENTER | Age: 76
End: 2021-07-16
Attending: INTERNAL MEDICINE
Payer: MEDICARE

## 2021-07-16 DIAGNOSIS — R07.89 OTHER CHEST PAIN: ICD-10-CM

## 2021-07-16 DIAGNOSIS — I71.20 THORACIC AORTIC ANEURYSM WITHOUT RUPTURE (HCC): ICD-10-CM

## 2021-07-16 PROCEDURE — 700111 HCHG RX REV CODE 636 W/ 250 OVERRIDE (IP)

## 2021-07-16 PROCEDURE — 93018 CV STRESS TEST I&R ONLY: CPT | Performed by: INTERNAL MEDICINE

## 2021-07-16 PROCEDURE — 78452 HT MUSCLE IMAGE SPECT MULT: CPT | Mod: 26 | Performed by: INTERNAL MEDICINE

## 2021-07-16 PROCEDURE — A9502 TC99M TETROFOSMIN: HCPCS

## 2021-07-16 RX ORDER — REGADENOSON 0.08 MG/ML
INJECTION, SOLUTION INTRAVENOUS
Status: COMPLETED
Start: 2021-07-16 | End: 2021-07-16

## 2021-07-16 RX ORDER — AMINOPHYLLINE 25 MG/ML
100 INJECTION, SOLUTION INTRAVENOUS
Status: DISCONTINUED | OUTPATIENT
Start: 2021-07-16 | End: 2021-07-17 | Stop reason: HOSPADM

## 2021-07-16 RX ORDER — REGADENOSON 0.08 MG/ML
0.4 INJECTION, SOLUTION INTRAVENOUS ONCE
Status: COMPLETED | OUTPATIENT
Start: 2021-07-16 | End: 2021-07-16

## 2021-07-16 RX ADMIN — REGADENOSON 0.4 MG: 0.08 INJECTION, SOLUTION INTRAVENOUS at 10:21

## 2021-07-19 ENCOUNTER — OFFICE VISIT (OUTPATIENT)
Dept: HEMATOLOGY ONCOLOGY | Facility: MEDICAL CENTER | Age: 76
End: 2021-07-19
Payer: MEDICARE

## 2021-07-19 VITALS
HEIGHT: 67 IN | HEART RATE: 74 BPM | OXYGEN SATURATION: 94 % | BODY MASS INDEX: 22.09 KG/M2 | SYSTOLIC BLOOD PRESSURE: 122 MMHG | TEMPERATURE: 100 F | DIASTOLIC BLOOD PRESSURE: 78 MMHG | RESPIRATION RATE: 17 BRPM | WEIGHT: 140.76 LBS

## 2021-07-19 DIAGNOSIS — D75.89 MACROCYTOSIS: ICD-10-CM

## 2021-07-19 DIAGNOSIS — D69.6 THROMBOCYTOPENIA (HCC): ICD-10-CM

## 2021-07-19 DIAGNOSIS — R76.8 POSITIVE ANA (ANTINUCLEAR ANTIBODY): ICD-10-CM

## 2021-07-19 DIAGNOSIS — D70.9 NEUTROPENIA, UNSPECIFIED TYPE (HCC): ICD-10-CM

## 2021-07-19 PROCEDURE — 99214 OFFICE O/P EST MOD 30 MIN: CPT | Performed by: NURSE PRACTITIONER

## 2021-07-19 ASSESSMENT — FIBROSIS 4 INDEX: FIB4 SCORE: 6.78

## 2021-07-19 ASSESSMENT — ENCOUNTER SYMPTOMS
WEIGHT LOSS: 0
SHORTNESS OF BREATH: 1
CONSTIPATION: 0
FEVER: 0
MYALGIAS: 0
CHILLS: 0
NAUSEA: 0
PALPITATIONS: 0
DIZZINESS: 0
COUGH: 0
VOMITING: 0
HEADACHES: 0
WHEEZING: 0
DIARRHEA: 0

## 2021-07-19 ASSESSMENT — PAIN SCALES - GENERAL: PAINLEVEL: NO PAIN

## 2021-07-19 NOTE — PROGRESS NOTES
Subjective:      Ibeth Morillo is a 76 y.o. female who presents with Other (EST/Neutropenia/Bone Marrow Biopsy Results )        HPI    Patient seen today in follow-up for bone marrow biopsy results.  She presents unaccompanied for today's visit.    Patient initially established with our office with [facility] physician, Dr. Berumen in May 2020 for neutropenia.  Lowest ANC was 1930.  However patient also noted to have leukopenia and thrombocytopenia during evaluation with Dr. Berumen.  Labs completed by Dr. Berumen yielded a negative JAK2 mutation, MPL and DANISHA R mutation.  LDH was within normal limits.  Ultrasound of the spleen on 5/28/2021 noted spleen to be the upper limits of normal in size.    In reviewing patient's labs was also noted that she had macrocytosis dating back to September 2019.  Further work-up for macrocytosis was completed including previous B12 and TSH, as well as folic acid, SPEP, and reticulocyte count all found to be within normal limits.      In regards to her mild neutropenia BIRDIE lab was completed which did come back BIRDIE was detected with multiple abnormal findings on reflex testing.  She was referred to rheumatology at last visit for further evaluation.    In reviewing CBC in detail was noted that patient's platelet count had continue to trend down, most recently at 111k.  In reviewing patient's CBC in detail with Dr. Lemos, recommendation for bone marrow biopsy with regards to the continued trend down in her platelet count.  Patient presents today to review the results of the bone marrow biopsy.    Pathology shows mild plasmacytosis with a slight kappa predominance of plasma cells by flow cytometry. This is not a diagnostic of plasma cell dyscrasia however the minute population of clonal plasma cells in a polyclonal background cannot be excluded. There is no evidence of MDS or an overt bone marrow disease. Pathologist stated this is likely related to autoimmune disease due to patient's recent  positive BIRDIE diagnosis. I personally reviewed the pathology report and it with the patient today.    Patient tolerated to the biopsy well. She did have bleeding post biopsy. She ended up calling after the biopsy and she was informed to put pressure on the area. The did stop and she is doing well. She has some intermittent tenderness every once around the area of the biopsy but otherwise is feeling well. She started taking boost which she thinks is helping her significantly. She noticed some fatigue but states she thinks it has improved with the use of boost. Her fatigue is mostly with weather changes. She does note some shortness of breath at times but states she thinks that is related to her previous smoking history.    Patient is followed by cardiology and has been prescribed aspirin. She stated that it upsets her stomach and she stopped this medication without speaking to her cardiologist.    Allergies   Allergen Reactions   • Pcn [Penicillins] Shortness of Breath, Rash and Swelling     .     Current Outpatient Medications on File Prior to Visit   Medication Sig Dispense Refill   • Multiple Vitamins-Minerals (DAILY MULTIVITAMIN PO) Take 1 tablet by mouth every day.     • losartan (COZAAR) 100 MG Tab Take 0.5 Tablets by mouth every day. 90 tablet 1   • spironolactone (ALDACTONE) 50 MG Tab TAKE 1 TABLET BY MOUTH DAILY 100 tablet 2   • levothyroxine (SYNTHROID) 88 MCG Tab Take 1 tablet by mouth every morning on an empty stomach. 90 tablet 3   • atorvastatin (LIPITOR) 80 MG tablet Take 1 Tab by mouth every day. 90 Tab 3   • traZODone (DESYREL) 150 MG Tab TAKE ONE TABLET BY MOUTH TWICE A  Tab 2   • aspirin (ASA) 325 MG TABS Take 325 mg by mouth every day. (Patient not taking: Reported on 7/19/2021)       No current facility-administered medications on file prior to visit.         Review of Systems   Constitutional: Positive for malaise/fatigue (waxes and wanes - mostly r/t weather changes). Negative for chills,  "fever and weight loss.   Respiratory: Positive for shortness of breath (from previous smoking history). Negative for cough and wheezing.    Cardiovascular: Negative for chest pain and palpitations.   Gastrointestinal: Negative for constipation, diarrhea, nausea and vomiting.   Genitourinary: Negative for dysuria.   Musculoskeletal: Negative for joint pain and myalgias.        No pain noted    Neurological: Negative for dizziness and headaches.          Objective:     /78   Pulse 74   Temp 37.8 °C (100 °F) (Temporal)   Resp 17   Ht 1.702 m (5' 7\")   Wt 63.9 kg (140 lb 12.2 oz)   LMP  (LMP Unknown)   SpO2 94%   BMI 22.05 kg/m²      Physical Exam  Vitals reviewed.   Constitutional:       General: She is not in acute distress.     Appearance: Normal appearance. She is not diaphoretic.   HENT:      Head: Normocephalic and atraumatic.   Cardiovascular:      Rate and Rhythm: Normal rate and regular rhythm.      Heart sounds: Normal heart sounds. No murmur heard.   No friction rub. No gallop.    Pulmonary:      Effort: Pulmonary effort is normal. No respiratory distress.      Breath sounds: Normal breath sounds. No wheezing.   Skin:     General: Skin is warm and dry.   Neurological:      Mental Status: She is alert and oriented to person, place, and time.   Psychiatric:         Mood and Affect: Mood normal.         Behavior: Behavior normal.            FINAL DIAGNOSIS:     Peripheral blood smear and CBC:          Mild leukopenia with mild absolute lymphopenia.          Mild thrombocytopenia.   Bone marrow aspirate, clot, and core biopsy:          Somewhat limited biopsy and aspirate due to cortical and           subcortical sampling.          Minimally hypercellular marrow with trilineage hematopoiesis.          No dysplasia or increased blasts.          Mildly increased plasma cells with slight kappa predominance.           See comment.          Cytogenetics ordered. Results will follow in an addendum. "     Comment: There is mild plasmacytosis as well a slight kappa   predominance of plasma cells by flow cytometry (cK/L ratio 3.3). This   is not diagnostic of a plasma cell dyscrasia, however a minute   population of clonal plasma cells in a polyclonal background cannot be   excluded. There is no evidence of MDS or overt bone marrow disease to   account for the patient's cytopenias, which may be related to   autoimmune disease given the history of positive BIRDIE.           Assessment/Plan:       1. Neutropenia, unspecified type (HCC)     2. Thrombocytopenia (HCC)     3. Macrocytosis     4. Positive BIRDIE (antinuclear antibody)         Patient with full hematologic work-up for thrombocytopenia and neutropenia including bone marrow biopsy. No evidence of a hematologic disorder. Patient did have a positive BIRDIE and has subsequently been referred to rheumatology who she has not scheduled with yet. Discussed with patient the importance of following up with rheumatology as this is likely the cause of her abnormal lab results. No further follow-up needed with hematology and she can return to the office as needed.    Patient did state that she has a cardiologist, Dr. Pulido, who has previously put her on aspirin. She stated that she discontinued the aspirin as it upsets her stomach. She is hoping to have a different blood thinner. I have highly encouraged patient to discuss further with her cardiologist regarding this.      Please note that this dictation was created using voice recognition software. I have made every reasonable attempt to correct obvious errors, but I expect that there are errors of grammar and possibly content that I did not discover before finalizing the note.

## 2021-08-13 ENCOUNTER — OFFICE VISIT (OUTPATIENT)
Dept: URGENT CARE | Facility: PHYSICIAN GROUP | Age: 76
End: 2021-08-13
Payer: MEDICARE

## 2021-08-13 ENCOUNTER — APPOINTMENT (OUTPATIENT)
Dept: RADIOLOGY | Facility: MEDICAL CENTER | Age: 76
DRG: 193 | End: 2021-08-13
Attending: EMERGENCY MEDICINE
Payer: MEDICARE

## 2021-08-13 ENCOUNTER — HOSPITAL ENCOUNTER (INPATIENT)
Facility: MEDICAL CENTER | Age: 76
LOS: 2 days | DRG: 193 | End: 2021-08-16
Attending: EMERGENCY MEDICINE | Admitting: STUDENT IN AN ORGANIZED HEALTH CARE EDUCATION/TRAINING PROGRAM
Payer: MEDICARE

## 2021-08-13 VITALS
DIASTOLIC BLOOD PRESSURE: 72 MMHG | WEIGHT: 131.4 LBS | HEIGHT: 67 IN | HEART RATE: 110 BPM | OXYGEN SATURATION: 88 % | RESPIRATION RATE: 16 BRPM | BODY MASS INDEX: 20.62 KG/M2 | TEMPERATURE: 100.2 F | SYSTOLIC BLOOD PRESSURE: 116 MMHG

## 2021-08-13 DIAGNOSIS — R50.9 FEVER, UNSPECIFIED FEVER CAUSE: ICD-10-CM

## 2021-08-13 DIAGNOSIS — R79.81 LOW OXYGEN SATURATION: ICD-10-CM

## 2021-08-13 DIAGNOSIS — R91.1 PULMONARY NODULE: ICD-10-CM

## 2021-08-13 DIAGNOSIS — J44.0 CHRONIC OBSTRUCTIVE PULMONARY DISEASE WITH ACUTE LOWER RESPIRATORY INFECTION (HCC): ICD-10-CM

## 2021-08-13 DIAGNOSIS — R53.1 WEAKNESS: ICD-10-CM

## 2021-08-13 DIAGNOSIS — R11.0 NAUSEA: ICD-10-CM

## 2021-08-13 DIAGNOSIS — J44.9 CHRONIC OBSTRUCTIVE PULMONARY DISEASE, UNSPECIFIED COPD TYPE (HCC): ICD-10-CM

## 2021-08-13 LAB
ALBUMIN SERPL BCP-MCNC: 3.7 G/DL (ref 3.2–4.9)
ALBUMIN/GLOB SERPL: 0.9 G/DL
ALP SERPL-CCNC: 95 U/L (ref 30–99)
ALT SERPL-CCNC: 32 U/L (ref 2–50)
ANION GAP SERPL CALC-SCNC: 13 MMOL/L (ref 7–16)
APPEARANCE UR: ABNORMAL
AST SERPL-CCNC: 73 U/L (ref 12–45)
BACTERIA #/AREA URNS HPF: ABNORMAL /HPF
BASOPHILS # BLD AUTO: 0.2 % (ref 0–1.8)
BASOPHILS # BLD: 0.01 K/UL (ref 0–0.12)
BILIRUB SERPL-MCNC: 0.9 MG/DL (ref 0.1–1.5)
BILIRUB UR QL STRIP.AUTO: NEGATIVE
BUN SERPL-MCNC: 21 MG/DL (ref 8–22)
CALCIUM SERPL-MCNC: 8.8 MG/DL (ref 8.5–10.5)
CHLORIDE SERPL-SCNC: 100 MMOL/L (ref 96–112)
CO2 SERPL-SCNC: 22 MMOL/L (ref 20–33)
COLOR UR: YELLOW
CREAT SERPL-MCNC: 1.02 MG/DL (ref 0.5–1.4)
D DIMER PPP IA.FEU-MCNC: 5.95 UG/ML (FEU) (ref 0–0.5)
EOSINOPHIL # BLD AUTO: 0.06 K/UL (ref 0–0.51)
EOSINOPHIL NFR BLD: 1.2 % (ref 0–6.9)
EPI CELLS #/AREA URNS HPF: ABNORMAL /HPF
ERYTHROCYTE [DISTWIDTH] IN BLOOD BY AUTOMATED COUNT: 46.1 FL (ref 35.9–50)
FLUAV RNA SPEC QL NAA+PROBE: NEGATIVE
FLUBV RNA SPEC QL NAA+PROBE: NEGATIVE
GLOBULIN SER CALC-MCNC: 4 G/DL (ref 1.9–3.5)
GLUCOSE SERPL-MCNC: 109 MG/DL (ref 65–99)
GLUCOSE UR STRIP.AUTO-MCNC: NEGATIVE MG/DL
HCT VFR BLD AUTO: 37.3 % (ref 37–47)
HGB BLD-MCNC: 12.3 G/DL (ref 12–16)
HYALINE CASTS #/AREA URNS LPF: ABNORMAL /LPF
IMM GRANULOCYTES # BLD AUTO: 0.03 K/UL (ref 0–0.11)
IMM GRANULOCYTES NFR BLD AUTO: 0.6 % (ref 0–0.9)
KETONES UR STRIP.AUTO-MCNC: ABNORMAL MG/DL
LACTATE BLD-SCNC: 1.7 MMOL/L (ref 0.5–2)
LEUKOCYTE ESTERASE UR QL STRIP.AUTO: ABNORMAL
LYMPHOCYTES # BLD AUTO: 0.38 K/UL (ref 1–4.8)
LYMPHOCYTES NFR BLD: 7.5 % (ref 22–41)
MCH RBC QN AUTO: 30.9 PG (ref 27–33)
MCHC RBC AUTO-ENTMCNC: 33 G/DL (ref 33.6–35)
MCV RBC AUTO: 93.7 FL (ref 81.4–97.8)
MICRO URNS: ABNORMAL
MONOCYTES # BLD AUTO: 0.52 K/UL (ref 0–0.85)
MONOCYTES NFR BLD AUTO: 10.3 % (ref 0–13.4)
NEUTROPHILS # BLD AUTO: 4.06 K/UL (ref 2–7.15)
NEUTROPHILS NFR BLD: 80.2 % (ref 44–72)
NITRITE UR QL STRIP.AUTO: NEGATIVE
NRBC # BLD AUTO: 0 K/UL
NRBC BLD-RTO: 0 /100 WBC
NT-PROBNP SERPL IA-MCNC: 226 PG/ML (ref 0–125)
PH UR STRIP.AUTO: 5.5 [PH] (ref 5–8)
PLATELET # BLD AUTO: 191 K/UL (ref 164–446)
PMV BLD AUTO: 10.6 FL (ref 9–12.9)
POTASSIUM SERPL-SCNC: 3.4 MMOL/L (ref 3.6–5.5)
PROT SERPL-MCNC: 7.7 G/DL (ref 6–8.2)
PROT UR QL STRIP: NEGATIVE MG/DL
RBC # BLD AUTO: 3.98 M/UL (ref 4.2–5.4)
RBC # URNS HPF: ABNORMAL /HPF
RBC UR QL AUTO: ABNORMAL
RSV RNA SPEC QL NAA+PROBE: NEGATIVE
SARS-COV-2 RNA RESP QL NAA+PROBE: NOTDETECTED
SODIUM SERPL-SCNC: 135 MMOL/L (ref 135–145)
SP GR UR STRIP.AUTO: 1.02
SPECIMEN SOURCE: NORMAL
TROPONIN T SERPL-MCNC: 14 NG/L (ref 6–19)
TSH SERPL DL<=0.005 MIU/L-ACNC: 0.79 UIU/ML (ref 0.38–5.33)
UROBILINOGEN UR STRIP.AUTO-MCNC: 1 MG/DL
WBC # BLD AUTO: 5.1 K/UL (ref 4.8–10.8)
WBC #/AREA URNS HPF: ABNORMAL /HPF

## 2021-08-13 PROCEDURE — 83880 ASSAY OF NATRIURETIC PEPTIDE: CPT

## 2021-08-13 PROCEDURE — 99285 EMERGENCY DEPT VISIT HI MDM: CPT

## 2021-08-13 PROCEDURE — 84443 ASSAY THYROID STIM HORMONE: CPT

## 2021-08-13 PROCEDURE — 80053 COMPREHEN METABOLIC PANEL: CPT

## 2021-08-13 PROCEDURE — 83605 ASSAY OF LACTIC ACID: CPT

## 2021-08-13 PROCEDURE — 85025 COMPLETE CBC W/AUTO DIFF WBC: CPT

## 2021-08-13 PROCEDURE — 81001 URINALYSIS AUTO W/SCOPE: CPT

## 2021-08-13 PROCEDURE — 85379 FIBRIN DEGRADATION QUANT: CPT

## 2021-08-13 PROCEDURE — 84484 ASSAY OF TROPONIN QUANT: CPT

## 2021-08-13 PROCEDURE — 87086 URINE CULTURE/COLONY COUNT: CPT

## 2021-08-13 PROCEDURE — 99214 OFFICE O/P EST MOD 30 MIN: CPT | Performed by: NURSE PRACTITIONER

## 2021-08-13 PROCEDURE — 71045 X-RAY EXAM CHEST 1 VIEW: CPT

## 2021-08-13 PROCEDURE — 0241U HCHG SARS-COV-2 COVID-19 NFCT DS RESP RNA 4 TRGT MIC: CPT

## 2021-08-13 PROCEDURE — 87040 BLOOD CULTURE FOR BACTERIA: CPT

## 2021-08-13 PROCEDURE — C9803 HOPD COVID-19 SPEC COLLECT: HCPCS | Performed by: EMERGENCY MEDICINE

## 2021-08-13 PROCEDURE — 93005 ELECTROCARDIOGRAM TRACING: CPT | Performed by: EMERGENCY MEDICINE

## 2021-08-13 RX ORDER — SPIRONOLACTONE 50 MG/1
50 TABLET, FILM COATED ORAL EVERY MORNING
COMMUNITY
End: 2022-02-08

## 2021-08-13 RX ORDER — TRAZODONE HYDROCHLORIDE 150 MG/1
150 TABLET ORAL
COMMUNITY
End: 2021-11-10

## 2021-08-13 ASSESSMENT — ENCOUNTER SYMPTOMS
ORTHOPNEA: 0
WEAKNESS: 1
DIARRHEA: 0
FEVER: 0
NAUSEA: 1
SHORTNESS OF BREATH: 0
DIZZINESS: 0
VOMITING: 1
MYALGIAS: 0
CHILLS: 0
PALPITATIONS: 0
FLANK PAIN: 0
CONSTIPATION: 0
HEADACHES: 0
ABDOMINAL PAIN: 0

## 2021-08-13 ASSESSMENT — FIBROSIS 4 INDEX
FIB4 SCORE: 6.78
FIB4 SCORE: 6.78

## 2021-08-14 ENCOUNTER — APPOINTMENT (OUTPATIENT)
Dept: CARDIOLOGY | Facility: MEDICAL CENTER | Age: 76
DRG: 193 | End: 2021-08-14
Attending: STUDENT IN AN ORGANIZED HEALTH CARE EDUCATION/TRAINING PROGRAM
Payer: MEDICARE

## 2021-08-14 ENCOUNTER — DOCUMENTATION (OUTPATIENT)
Dept: RESPIRATORY THERAPY | Facility: MEDICAL CENTER | Age: 76
End: 2021-08-14

## 2021-08-14 PROBLEM — J44.9 COPD (CHRONIC OBSTRUCTIVE PULMONARY DISEASE) (HCC): Status: ACTIVE | Noted: 2021-08-14

## 2021-08-14 PROBLEM — Z71.89 ACP (ADVANCE CARE PLANNING): Status: ACTIVE | Noted: 2021-08-14

## 2021-08-14 PROBLEM — R91.1 PULMONARY NODULE: Status: ACTIVE | Noted: 2021-08-14

## 2021-08-14 PROBLEM — J18.9 PNEUMONIA: Status: ACTIVE | Noted: 2021-08-14

## 2021-08-14 PROBLEM — E87.6 HYPOKALEMIA: Status: ACTIVE | Noted: 2021-08-14

## 2021-08-14 PROBLEM — E44.0 MODERATE PROTEIN-CALORIE MALNUTRITION (HCC): Status: ACTIVE | Noted: 2021-08-14

## 2021-08-14 PROBLEM — R73.9 HYPERGLYCEMIA: Status: ACTIVE | Noted: 2021-08-14

## 2021-08-14 PROBLEM — J44.1 ACUTE EXACERBATION OF CHRONIC OBSTRUCTIVE PULMONARY DISEASE (COPD) (HCC): Status: ACTIVE | Noted: 2021-08-14

## 2021-08-14 PROBLEM — J96.01 ACUTE RESPIRATORY FAILURE WITH HYPOXIA (HCC): Status: ACTIVE | Noted: 2021-08-14

## 2021-08-14 LAB
ANION GAP SERPL CALC-SCNC: 9 MMOL/L (ref 7–16)
BUN SERPL-MCNC: 21 MG/DL (ref 8–22)
CALCIUM SERPL-MCNC: 8.4 MG/DL (ref 8.5–10.5)
CHLORIDE SERPL-SCNC: 103 MMOL/L (ref 96–112)
CO2 SERPL-SCNC: 22 MMOL/L (ref 20–33)
CREAT SERPL-MCNC: 0.9 MG/DL (ref 0.5–1.4)
EKG IMPRESSION: NORMAL
GLUCOSE SERPL-MCNC: 94 MG/DL (ref 65–99)
MAGNESIUM SERPL-MCNC: 2.2 MG/DL (ref 1.5–2.5)
POTASSIUM SERPL-SCNC: 4.4 MMOL/L (ref 3.6–5.5)
PROCALCITONIN SERPL-MCNC: <0.05 NG/ML
SODIUM SERPL-SCNC: 134 MMOL/L (ref 135–145)

## 2021-08-14 PROCEDURE — 700102 HCHG RX REV CODE 250 W/ 637 OVERRIDE(OP): Performed by: STUDENT IN AN ORGANIZED HEALTH CARE EDUCATION/TRAINING PROGRAM

## 2021-08-14 PROCEDURE — 94669 MECHANICAL CHEST WALL OSCILL: CPT

## 2021-08-14 PROCEDURE — 84145 PROCALCITONIN (PCT): CPT

## 2021-08-14 PROCEDURE — 700101 HCHG RX REV CODE 250: Performed by: STUDENT IN AN ORGANIZED HEALTH CARE EDUCATION/TRAINING PROGRAM

## 2021-08-14 PROCEDURE — 700111 HCHG RX REV CODE 636 W/ 250 OVERRIDE (IP): Performed by: STUDENT IN AN ORGANIZED HEALTH CARE EDUCATION/TRAINING PROGRAM

## 2021-08-14 PROCEDURE — 770020 HCHG ROOM/CARE - TELE (206)

## 2021-08-14 PROCEDURE — 96375 TX/PRO/DX INJ NEW DRUG ADDON: CPT

## 2021-08-14 PROCEDURE — 93306 TTE W/DOPPLER COMPLETE: CPT

## 2021-08-14 PROCEDURE — 83735 ASSAY OF MAGNESIUM: CPT

## 2021-08-14 PROCEDURE — 92610 EVALUATE SWALLOWING FUNCTION: CPT

## 2021-08-14 PROCEDURE — 99223 1ST HOSP IP/OBS HIGH 75: CPT | Mod: AI,25 | Performed by: STUDENT IN AN ORGANIZED HEALTH CARE EDUCATION/TRAINING PROGRAM

## 2021-08-14 PROCEDURE — 700105 HCHG RX REV CODE 258: Performed by: STUDENT IN AN ORGANIZED HEALTH CARE EDUCATION/TRAINING PROGRAM

## 2021-08-14 PROCEDURE — 86738 MYCOPLASMA ANTIBODY: CPT

## 2021-08-14 PROCEDURE — 700117 HCHG RX CONTRAST REV CODE 255: Performed by: EMERGENCY MEDICINE

## 2021-08-14 PROCEDURE — 36415 COLL VENOUS BLD VENIPUNCTURE: CPT

## 2021-08-14 PROCEDURE — A9270 NON-COVERED ITEM OR SERVICE: HCPCS | Performed by: STUDENT IN AN ORGANIZED HEALTH CARE EDUCATION/TRAINING PROGRAM

## 2021-08-14 PROCEDURE — 71275 CT ANGIOGRAPHY CHEST: CPT | Mod: ME

## 2021-08-14 PROCEDURE — 80048 BASIC METABOLIC PNL TOTAL CA: CPT

## 2021-08-14 PROCEDURE — 96365 THER/PROPH/DIAG IV INF INIT: CPT

## 2021-08-14 PROCEDURE — 700111 HCHG RX REV CODE 636 W/ 250 OVERRIDE (IP): Performed by: EMERGENCY MEDICINE

## 2021-08-14 PROCEDURE — 94760 N-INVAS EAR/PLS OXIMETRY 1: CPT

## 2021-08-14 PROCEDURE — 99497 ADVNCD CARE PLAN 30 MIN: CPT | Performed by: STUDENT IN AN ORGANIZED HEALTH CARE EDUCATION/TRAINING PROGRAM

## 2021-08-14 RX ORDER — TRAZODONE HYDROCHLORIDE 100 MG/1
100 TABLET ORAL
Status: DISCONTINUED | OUTPATIENT
Start: 2021-08-14 | End: 2021-08-16 | Stop reason: HOSPADM

## 2021-08-14 RX ORDER — AZITHROMYCIN 500 MG/1
500 INJECTION, POWDER, LYOPHILIZED, FOR SOLUTION INTRAVENOUS ONCE
Status: COMPLETED | OUTPATIENT
Start: 2021-08-14 | End: 2021-08-14

## 2021-08-14 RX ORDER — MAGNESIUM SULFATE HEPTAHYDRATE 40 MG/ML
2 INJECTION, SOLUTION INTRAVENOUS ONCE
Status: COMPLETED | OUTPATIENT
Start: 2021-08-14 | End: 2021-08-14

## 2021-08-14 RX ORDER — BUDESONIDE 0.5 MG/2ML
0.5 INHALANT ORAL
Status: DISCONTINUED | OUTPATIENT
Start: 2021-08-14 | End: 2021-08-14

## 2021-08-14 RX ORDER — LOSARTAN POTASSIUM 50 MG/1
100 TABLET ORAL DAILY
Status: DISCONTINUED | OUTPATIENT
Start: 2021-08-14 | End: 2021-08-16 | Stop reason: HOSPADM

## 2021-08-14 RX ORDER — SODIUM CHLORIDE, SODIUM LACTATE, POTASSIUM CHLORIDE, CALCIUM CHLORIDE 600; 310; 30; 20 MG/100ML; MG/100ML; MG/100ML; MG/100ML
1000 INJECTION, SOLUTION INTRAVENOUS CONTINUOUS
Status: DISCONTINUED | OUTPATIENT
Start: 2021-08-14 | End: 2021-08-14

## 2021-08-14 RX ORDER — IPRATROPIUM BROMIDE AND ALBUTEROL SULFATE 2.5; .5 MG/3ML; MG/3ML
3 SOLUTION RESPIRATORY (INHALATION)
Status: DISCONTINUED | OUTPATIENT
Start: 2021-08-14 | End: 2021-08-16 | Stop reason: HOSPADM

## 2021-08-14 RX ORDER — POTASSIUM CHLORIDE 20 MEQ/1
60 TABLET, EXTENDED RELEASE ORAL ONCE
Status: COMPLETED | OUTPATIENT
Start: 2021-08-14 | End: 2021-08-14

## 2021-08-14 RX ORDER — SODIUM CHLORIDE, SODIUM LACTATE, POTASSIUM CHLORIDE, AND CALCIUM CHLORIDE .6; .31; .03; .02 G/100ML; G/100ML; G/100ML; G/100ML
500 INJECTION, SOLUTION INTRAVENOUS
Status: DISCONTINUED | OUTPATIENT
Start: 2021-08-14 | End: 2021-08-16 | Stop reason: HOSPADM

## 2021-08-14 RX ORDER — ACETAMINOPHEN 325 MG/1
650 TABLET ORAL EVERY 6 HOURS PRN
Status: DISCONTINUED | OUTPATIENT
Start: 2021-08-14 | End: 2021-08-16 | Stop reason: HOSPADM

## 2021-08-14 RX ORDER — AMOXICILLIN 250 MG
2 CAPSULE ORAL 2 TIMES DAILY
Status: DISCONTINUED | OUTPATIENT
Start: 2021-08-14 | End: 2021-08-16 | Stop reason: HOSPADM

## 2021-08-14 RX ORDER — ATORVASTATIN CALCIUM 80 MG/1
80 TABLET, FILM COATED ORAL
Status: DISCONTINUED | OUTPATIENT
Start: 2021-08-14 | End: 2021-08-16 | Stop reason: HOSPADM

## 2021-08-14 RX ORDER — LEVOTHYROXINE SODIUM 88 UG/1
88 TABLET ORAL
Status: DISCONTINUED | OUTPATIENT
Start: 2021-08-14 | End: 2021-08-16 | Stop reason: HOSPADM

## 2021-08-14 RX ORDER — BISACODYL 10 MG
10 SUPPOSITORY, RECTAL RECTAL
Status: DISCONTINUED | OUTPATIENT
Start: 2021-08-14 | End: 2021-08-16 | Stop reason: HOSPADM

## 2021-08-14 RX ORDER — TRAZODONE HYDROCHLORIDE 50 MG/1
150 TABLET ORAL
Status: DISCONTINUED | OUTPATIENT
Start: 2021-08-14 | End: 2021-08-14

## 2021-08-14 RX ORDER — POLYETHYLENE GLYCOL 3350 17 G/17G
1 POWDER, FOR SOLUTION ORAL
Status: DISCONTINUED | OUTPATIENT
Start: 2021-08-14 | End: 2021-08-16 | Stop reason: HOSPADM

## 2021-08-14 RX ORDER — ONDANSETRON 4 MG/1
4 TABLET, ORALLY DISINTEGRATING ORAL EVERY 4 HOURS PRN
Status: DISCONTINUED | OUTPATIENT
Start: 2021-08-14 | End: 2021-08-16 | Stop reason: HOSPADM

## 2021-08-14 RX ORDER — GUAIFENESIN/DEXTROMETHORPHAN 100-10MG/5
10 SYRUP ORAL EVERY 6 HOURS PRN
Status: DISCONTINUED | OUTPATIENT
Start: 2021-08-14 | End: 2021-08-16 | Stop reason: HOSPADM

## 2021-08-14 RX ORDER — METHYLPREDNISOLONE SODIUM SUCCINATE 40 MG/ML
40 INJECTION, POWDER, LYOPHILIZED, FOR SOLUTION INTRAMUSCULAR; INTRAVENOUS EVERY 8 HOURS
Status: DISCONTINUED | OUTPATIENT
Start: 2021-08-14 | End: 2021-08-14

## 2021-08-14 RX ORDER — LABETALOL HYDROCHLORIDE 5 MG/ML
10 INJECTION, SOLUTION INTRAVENOUS EVERY 4 HOURS PRN
Status: DISCONTINUED | OUTPATIENT
Start: 2021-08-14 | End: 2021-08-16 | Stop reason: HOSPADM

## 2021-08-14 RX ORDER — IPRATROPIUM BROMIDE AND ALBUTEROL SULFATE 2.5; .5 MG/3ML; MG/3ML
3 SOLUTION RESPIRATORY (INHALATION)
Status: DISCONTINUED | OUTPATIENT
Start: 2021-08-14 | End: 2021-08-14

## 2021-08-14 RX ORDER — ENALAPRILAT 1.25 MG/ML
1.25 INJECTION INTRAVENOUS EVERY 6 HOURS PRN
Status: DISCONTINUED | OUTPATIENT
Start: 2021-08-14 | End: 2021-08-16 | Stop reason: HOSPADM

## 2021-08-14 RX ORDER — HEPARIN SODIUM 5000 [USP'U]/ML
5000 INJECTION, SOLUTION INTRAVENOUS; SUBCUTANEOUS EVERY 8 HOURS
Status: DISCONTINUED | OUTPATIENT
Start: 2021-08-14 | End: 2021-08-14

## 2021-08-14 RX ORDER — ONDANSETRON 2 MG/ML
4 INJECTION INTRAMUSCULAR; INTRAVENOUS EVERY 4 HOURS PRN
Status: DISCONTINUED | OUTPATIENT
Start: 2021-08-14 | End: 2021-08-16 | Stop reason: HOSPADM

## 2021-08-14 RX ORDER — SPIRONOLACTONE 25 MG/1
50 TABLET ORAL EVERY MORNING
Status: DISCONTINUED | OUTPATIENT
Start: 2021-08-14 | End: 2021-08-16 | Stop reason: HOSPADM

## 2021-08-14 RX ADMIN — AZITHROMYCIN MONOHYDRATE 500 MG: 500 INJECTION, POWDER, LYOPHILIZED, FOR SOLUTION INTRAVENOUS at 02:23

## 2021-08-14 RX ADMIN — CEFTRIAXONE SODIUM 1 G: 10 INJECTION, POWDER, FOR SOLUTION INTRAVENOUS at 02:18

## 2021-08-14 RX ADMIN — SPIRONOLACTONE 50 MG: 25 TABLET ORAL at 05:47

## 2021-08-14 RX ADMIN — HEPARIN SODIUM 5000 UNITS: 5000 INJECTION, SOLUTION INTRAVENOUS; SUBCUTANEOUS at 05:47

## 2021-08-14 RX ADMIN — TRAZODONE HYDROCHLORIDE 100 MG: 100 TABLET ORAL at 04:31

## 2021-08-14 RX ADMIN — LOSARTAN POTASSIUM 100 MG: 50 TABLET, FILM COATED ORAL at 05:47

## 2021-08-14 RX ADMIN — DOXYCYCLINE 100 MG: 100 INJECTION, POWDER, LYOPHILIZED, FOR SOLUTION INTRAVENOUS at 07:43

## 2021-08-14 RX ADMIN — METHYLPREDNISOLONE SODIUM SUCCINATE 40 MG: 40 INJECTION, POWDER, FOR SOLUTION INTRAMUSCULAR; INTRAVENOUS at 05:47

## 2021-08-14 RX ADMIN — MAGNESIUM SULFATE 2 G: 2 INJECTION INTRAVENOUS at 04:32

## 2021-08-14 RX ADMIN — METRONIDAZOLE 500 MG: 500 INJECTION, SOLUTION INTRAVENOUS at 06:41

## 2021-08-14 RX ADMIN — IOHEXOL 80 ML: 350 INJECTION, SOLUTION INTRAVENOUS at 01:45

## 2021-08-14 RX ADMIN — ATORVASTATIN CALCIUM 80 MG: 80 TABLET, FILM COATED ORAL at 21:27

## 2021-08-14 RX ADMIN — DOXYCYCLINE 100 MG: 100 INJECTION, POWDER, LYOPHILIZED, FOR SOLUTION INTRAVENOUS at 16:13

## 2021-08-14 RX ADMIN — POTASSIUM CHLORIDE 60 MEQ: 1500 TABLET, EXTENDED RELEASE ORAL at 04:31

## 2021-08-14 RX ADMIN — LEVOTHYROXINE SODIUM 88 MCG: 0.09 TABLET ORAL at 05:47

## 2021-08-14 RX ADMIN — TRAZODONE HYDROCHLORIDE 100 MG: 100 TABLET ORAL at 21:26

## 2021-08-14 ASSESSMENT — COGNITIVE AND FUNCTIONAL STATUS - GENERAL
MOBILITY SCORE: 23
DAILY ACTIVITIY SCORE: 24
SUGGESTED CMS G CODE MODIFIER MOBILITY: CI
CLIMB 3 TO 5 STEPS WITH RAILING: A LITTLE
SUGGESTED CMS G CODE MODIFIER DAILY ACTIVITY: CH

## 2021-08-14 ASSESSMENT — ENCOUNTER SYMPTOMS
ABDOMINAL PAIN: 0
HEMOPTYSIS: 0
CONSTIPATION: 0
DIARRHEA: 1
WEAKNESS: 1
CHILLS: 1
BLURRED VISION: 0
DIZZINESS: 0
FLANK PAIN: 0
BRUISES/BLEEDS EASILY: 0
DIAPHORESIS: 0
FALLS: 0
BLOOD IN STOOL: 0
DOUBLE VISION: 0
MYALGIAS: 0
WEIGHT LOSS: 1
HEADACHES: 0
FEVER: 1
SHORTNESS OF BREATH: 1
VOMITING: 1
HEARTBURN: 0
DEPRESSION: 0
NAUSEA: 1
WHEEZING: 1
SPUTUM PRODUCTION: 1
COUGH: 1

## 2021-08-14 ASSESSMENT — PATIENT HEALTH QUESTIONNAIRE - PHQ9
8. MOVING OR SPEAKING SO SLOWLY THAT OTHER PEOPLE COULD HAVE NOTICED. OR THE OPPOSITE, BEING SO FIGETY OR RESTLESS THAT YOU HAVE BEEN MOVING AROUND A LOT MORE THAN USUAL: SEVERAL DAYS
SUM OF ALL RESPONSES TO PHQ QUESTIONS 1-9: 9
SUM OF ALL RESPONSES TO PHQ9 QUESTIONS 1 AND 2: 2
6. FEELING BAD ABOUT YOURSELF - OR THAT YOU ARE A FAILURE OR HAVE LET YOURSELF OR YOUR FAMILY DOWN: SEVERAL DAYS
5. POOR APPETITE OR OVEREATING: SEVERAL DAYS
2. FEELING DOWN, DEPRESSED, IRRITABLE, OR HOPELESS: SEVERAL DAYS
1. LITTLE INTEREST OR PLEASURE IN DOING THINGS: SEVERAL DAYS
4. FEELING TIRED OR HAVING LITTLE ENERGY: SEVERAL DAYS
9. THOUGHTS THAT YOU WOULD BE BETTER OFF DEAD, OR OF HURTING YOURSELF: SEVERAL DAYS
7. TROUBLE CONCENTRATING ON THINGS, SUCH AS READING THE NEWSPAPER OR WATCHING TELEVISION: SEVERAL DAYS
3. TROUBLE FALLING OR STAYING ASLEEP OR SLEEPING TOO MUCH: SEVERAL DAYS

## 2021-08-14 ASSESSMENT — LIFESTYLE VARIABLES
HOW MANY TIMES IN THE PAST YEAR HAVE YOU HAD 5 OR MORE DRINKS IN A DAY: 0
ALCOHOL_USE: NO
HAVE YOU EVER FELT YOU SHOULD CUT DOWN ON YOUR DRINKING: NO
ON A TYPICAL DAY WHEN YOU DRINK ALCOHOL HOW MANY DRINKS DO YOU HAVE: 0
EVER HAD A DRINK FIRST THING IN THE MORNING TO STEADY YOUR NERVES TO GET RID OF A HANGOVER: NO
EVER_SMOKED: YES
EVER FELT BAD OR GUILTY ABOUT YOUR DRINKING: NO
TOTAL SCORE: 0
HAVE PEOPLE ANNOYED YOU BY CRITICIZING YOUR DRINKING: NO
AVERAGE NUMBER OF DAYS PER WEEK YOU HAVE A DRINK CONTAINING ALCOHOL: 0
CONSUMPTION TOTAL: NEGATIVE
TOTAL SCORE: 0
TOTAL SCORE: 0

## 2021-08-14 ASSESSMENT — PAIN DESCRIPTION - PAIN TYPE
TYPE: ACUTE PAIN
TYPE: ACUTE PAIN

## 2021-08-14 ASSESSMENT — FIBROSIS 4 INDEX: FIB4 SCORE: 5.27

## 2021-08-14 ASSESSMENT — COPD QUESTIONNAIRES
DO YOU EVER COUGH UP ANY MUCUS OR PHLEGM?: YES, EVERY DAY
DURING THE PAST 4 WEEKS HOW MUCH DID YOU FEEL SHORT OF BREATH: SOME OF THE TIME
COPD SCREENING SCORE: 8
HAVE YOU SMOKED AT LEAST 100 CIGARETTES IN YOUR ENTIRE LIFE: YES

## 2021-08-14 NOTE — ASSESSMENT & PLAN NOTE
Continue with statin therapy  Patient reports she does not take any aspirin due to upset stomach    Patient follows with cardiology, Dr. Pulido, had a stress test July 21 which was negative for any acute ischemia.

## 2021-08-14 NOTE — ED PROVIDER NOTES
"ED Provider Note    CHIEF COMPLAINT  Chief Complaint   Patient presents with   • Fever     \"102\" PTA  x 9 days   • Shortness of Breath     88% on RA   • Nausea/Vomiting/Diarrhea       HPI  Evie Morillo is a 76 y.o. female who presents with a variety of symptoms over the past 7 to 10 days. No known recent sick contacts. She has generalized weakness and malaise, shortness of breath, diarrhea, change in taste of foods. Patient had Covid vaccine in February and March.    Patient notes that she quit from long-term smoking last February. She denies prior pulmonary issues in the past.    She notes a history of abdominal aortic aneurysm status post repair several times by minimally invasive technique. She also has a history of leukopenia that has been undergoing work-up. She was told that she may have lupus. She also had a bone marrow biopsy recently that was unremarkable according to the patient. She has a referral visit with a rheumatologist for the first time for further evaluation.    No recent surgeries. No lower extremity swelling or pain. No active chest pain. No active abdominal pain. No rashes. No recent trauma. No recent travel. States that she has been rather isolated at home except for trips to the grocery store.    REVIEW OF SYSTEMS  See HPI for further details. All other systems are negative.     PAST MEDICAL HISTORY   has a past medical history of AAA (abdominal aortic aneurysm) (HCC), Arthritis, CAD (coronary artery disease) (10/4/2012), Chronic insomnia (5/23/2016), Dental disorder, Depression (9/26/2013), Diverticula of colon, Environmental and seasonal allergies, High cholesterol, Hyperlipidemia (10/4/2012), Hypertension, Hypothyroid (10/17/2013), Lupus (HCC), Nonrheumatic aortic valve insufficiency, Postural hypotension (10/7/2014), Sleep apnea, TIA (transient ischemic attack) (10/04/2012), and Tobacco abuse (4/20/2016).    SOCIAL HISTORY  Social History     Tobacco Use   • Smoking status: Former " "Smoker     Packs/day: 0.25     Years: 40.00     Pack years: 10.00     Types: Cigarettes     Quit date: 2020     Years since quittin.4   • Smokeless tobacco: Never Used   Vaping Use   • Vaping Use: Never used   Substance and Sexual Activity   • Alcohol use: Not Currently   • Drug use: Not Currently   • Sexual activity: Never     Partners: Male       SURGICAL HISTORY   has a past surgical history that includes stent placement (); other (Left); other; aaa with stent graft (2020); dx bone marrow aspirations (Bilateral, 2021); and dx bone marrow biopsies (Bilateral, 2021).    CURRENT MEDICATIONS  Home Medications     Reviewed by Slime Sahni R.N. (Registered Nurse) on 21 at 1906  Med List Status: Partial   Medication Last Dose Status   aspirin (ASA) 325 MG TABS  Active   atorvastatin (LIPITOR) 80 MG tablet  Active   levothyroxine (SYNTHROID) 88 MCG Tab  Active   losartan (COZAAR) 100 MG Tab  Active   Multiple Vitamins-Minerals (DAILY MULTIVITAMIN PO)  Active   spironolactone (ALDACTONE) 50 MG Tab  Active   traZODone (DESYREL) 150 MG Tab  Active                ALLERGIES  Allergies   Allergen Reactions   • Pcn [Penicillins] Shortness of Breath, Rash and Swelling     .       PHYSICAL EXAM  VITAL SIGNS: /87   Pulse 95   Temp 36.8 °C (98.2 °F) (Oral)   Resp 18   Ht 1.702 m (5' 7\")   Wt 59.5 kg (131 lb 2.8 oz)   LMP  (LMP Unknown)   SpO2 99%   BMI 20.54 kg/m²   Pulse ox interpretation: I interpret this pulse ox as normal.  Constitutional: Alert in no apparent distress.  HENT: No signs of trauma, Bilateral external ears normal, Nose normal.   Eyes: Conjunctiva normal, Non-icteric.   Neck: Normal range of motion, No tenderness, Supple, No stridor.   Cardiovascular: Regular rate and rhythm.   Thorax & Lungs: Normal breath sounds, No respiratory distress, No wheezing, No chest tenderness.   Abdomen: Soft, No tenderness, No masses, No pulsatile masses. No peritoneal " signs.  Skin: Warm, Dry, No erythema, No rash.   Back: No bony tenderness, No CVA tenderness.   Extremities: Intact distal pulses, No edema, No tenderness, No cyanosis  Musculoskeletal: Good range of motion in all major joints. No major deformities noted.   Neurologic: Alert, No focal deficits noted.       DIAGNOSTIC STUDIES / PROCEDURES    EKG - Physician interpretation  Results for orders placed or performed during the hospital encounter of 21   EKG   Result Value Ref Range    Report       [facility]    Test Date:  2021  Pt Name:    DESIREE GUZMAN              Department: ER  MRN:        [id number]                      Room:       [facility]  Gender:     Female                       Technician: [id number]  :        [id number]                   Requested By:CHICHI GOEL  Order #:    [id number]                    Reading MD:    Measurements  Intervals                                Axis  Rate:       77                           P:          69  NJ:         168                          QRS:        47  QRSD:       92                           T:          51  QT:         392  QTc:        444    Interpretive Statements  SINUS RHYTHM  Compared to ECG 2016 14:18:09  Sinus bradycardia no longer present  Right ventricular hypertrophy no longer present           LABS  Labs Reviewed   CBC WITH DIFFERENTIAL - Abnormal; Notable for the following components:       Result Value    RBC 3.98 (*)     MCHC 33.0 (*)     Neutrophils-Polys 80.20 (*)     Lymphocytes 7.50 (*)     Lymphs (Absolute) 0.38 (*)     All other components within normal limits   COMP METABOLIC PANEL - Abnormal; Notable for the following components:    Potassium 3.4 (*)     Glucose 109 (*)     AST(SGOT) 73 (*)     Globulin 4.0 (*)     All other components within normal limits   ESTIMATED GFR - Abnormal; Notable for the following components:    GFR If Non  52 (*)     All other components within normal limits    LACTIC ACID   LACTIC ACID   LACTIC ACID   URINALYSIS   URINE CULTURE(NEW)   BLOOD CULTURE   BLOOD CULTURE   TSH WITH REFLEX TO FT4   COV-2, FLU A/B, AND RSV BY PCR   TROPONIN   PROBRAIN NATRIURETIC PEPTIDE, NT         RADIOLOGY  CT-CTA CHEST PULMONARY ARTERY W/ RECONS   Final Result         1.  No pulmonary embolus appreciated.   2.  Patchy reticular linear opacity in the right upper lobe, could represent early Covid infiltrates or other infiltrate. Appears to correspond with area concerning for nodule on chest x-ray.   3.  Changes of emphysema.   4.  Ascending thoracic aortic aneurysm, radiographic follow-up and surveillance recommended as clinically appropriate.   5.  Atherosclerosis and atherosclerotic coronary artery disease      DX-CHEST-PORTABLE (1 VIEW)   Final Result      1.  Nodular opacity in the peripheral right midlung measuring 1.4 cm. Further evaluation with CT chest is recommended.   2.  Stable cardiomegaly.   3.  Atherosclerotic plaque.      EC-ECHOCARDIOGRAM COMPLETE W/O CONT    (Results Pending)   US-EXTREMITY VENOUS LOWER BILAT    (Results Pending)         COURSE & MEDICAL DECISION MAKING    Medications   senna-docusate (PERICOLACE or SENOKOT S) 8.6-50 MG per tablet 2 Tablet (2 Tablets Oral Refused 8/14/21 0600)     And   polyethylene glycol/lytes (MIRALAX) PACKET 1 Packet (has no administration in time range)     And   magnesium hydroxide (MILK OF MAGNESIA) suspension 30 mL (has no administration in time range)     And   bisacodyl (DULCOLAX) suppository 10 mg (has no administration in time range)   lactated ringers infusion (BOLUS) (has no administration in time range)   acetaminophen (Tylenol) tablet 650 mg (has no administration in time range)   enalaprilat (VASOTEC) injection 1.25 mg (has no administration in time range)   labetalol (NORMODYNE/TRANDATE) injection 10 mg (has no administration in time range)   ondansetron (ZOFRAN) syringe/vial injection 4 mg (has no administration in time  range)   ondansetron (ZOFRAN ODT) dispertab 4 mg (has no administration in time range)   guaiFENesin dextromethorphan (ROBITUSSIN DM) 100-10 MG/5ML syrup 10 mL (has no administration in time range)   Respiratory Therapy Consult (has no administration in time range)   ipratropium-albuterol (DUONEB) nebulizer solution (3 mL Nebulization Missed 8/14/21 0800)   budesonide (PULMICORT) neb susp 0.5 mg (0.5 mg Nebulization Missed 8/14/21 0800)   doxycycline (VIBRAMYCIN) 100 mg in  mL IVPB (0 mg Intravenous Stopped 8/14/21 0843)   atorvastatin (LIPITOR) tablet 80 mg (has no administration in time range)   levothyroxine (SYNTHROID) tablet 88 mcg (88 mcg Oral Given 8/14/21 0547)   losartan (COZAAR) tablet 100 mg (100 mg Oral Given 8/14/21 0547)   spironolactone (ALDACTONE) tablet 50 mg (50 mg Oral Given 8/14/21 0547)   traZODone (DESYREL) tablet 100 mg (100 mg Oral Given 8/14/21 0431)   cefTRIAXone (ROCEPHIN) 1 g in  mL IVPB (has no administration in time range)   metroNIDAZOLE (FLAGYL) IVPB 500 mg (0 mg Intravenous Stopped 8/14/21 0741)   enoxaparin (LOVENOX) inj 40 mg (has no administration in time range)   benzocaine-menthol (CEPACOL) lozenge 1 Lozenge (has no administration in time range)   iohexol (OMNIPAQUE) 350 mg/mL (80 mL Intravenous Given 8/14/21 0145)   cefTRIAXone (Rocephin) syringe 1 g (1 g Intravenous Given 8/14/21 0218)   azithromycin (ZITHROMAX) injection 500 mg (0 mg Intravenous Stopped 8/14/21 0323)   potassium chloride SA (Kdur) tablet 60 mEq (60 mEq Oral Given 8/14/21 0431)   magnesium sulfate IVPB premix 2 g (0 g Intravenous Stopped 8/14/21 0632)       Pertinent Labs & Imaging studies reviewed. (See chart for details)  76 y.o. female presenting with generalized weakness, malaise, shortness of breath and hypoxia, fevers over the past week or so.  She has been vaccinated for Covid several months ago.  No active vomiting here.  She was placed on supplemental oxygen upon arrival and feels much  "improved.  Laboratory studies are largely unremarkable.  No leukocytosis.  No significant metabolic abnormalities consistent with her symptoms.  Covid testing was negative.    Chest x-ray showing a nodular opacity on the right lateral lung.  CT pulmonary angiogram was ordered for further evaluation.  Patient has hypoxia and fatigue of uncertain etiology.  She was found to have no pulmonary embolus however chest patchy reticular linear opacity to the right upper lobe of uncertain significance at this time.  Has changes consistent with emphysema.  Patient does not have wheezing or active respiratory distress.    Unclear etiology for the patient's hypoxia at this time.  She was treated with antibiotics for possible bacterial pneumonia to the right lung.  It is possible that the patient had a false negative Covid test as well and is suffering from breakthrough Covid.    As a result of the patient's hypoxia and need for supplemental oxygen, she will be hospitalized for further management.      /87   Pulse 95   Temp 36.8 °C (98.2 °F) (Oral)   Resp 18   Ht 1.702 m (5' 7\")   Wt 59.5 kg (131 lb 2.8 oz)   LMP  (LMP Unknown)   SpO2 99%   BMI 20.54 kg/m²     The total critical care time on this patient is 35 minutes, resuscitating patient, speaking with admitting physician, and deciphering test results. This 35 minutes is exclusive of separately billable procedures.    FINAL IMPRESSION  1. Chronic obstructive pulmonary disease with acute lower respiratory infection (HCC)    2.   Hypoxia           Electronically signed by: Andrea Pendleton M.D., 8/13/2021 7:55 PM    "

## 2021-08-14 NOTE — ED NOTES
Med rec completed per Pt at bedside.  Allergies reviewed with Pt.  No oral antibiotics in last 30 days.  [facility].

## 2021-08-14 NOTE — PROGRESS NOTES
Report received from Yeimy Joshi RN. Updated on POC.  Assumed care of patient upon arrival to unit. Patient currently A & O x 4; on 3 L O2 95%; up self; without complaints of acute pain. Pt placed on monitor, monitor room notified, SR. Patient oriented to unit and to call light system. Call light within reach. Pt educated to fall risk. Fall precautions in place. Pt provided with personal grooming items. Bed locked and in lowest position. All questions answered. No other needs indicated at this time.

## 2021-08-14 NOTE — RESPIRATORY CARE
"COPD EDUCATION by COPD CLINICAL EDUCATOR  (Phone: [phone])  8/14/2021 at 1:50 PM by Gala Boyd, NGUYEN     Patient seen by Respiratory Education team to complete Block 1 of a 2 part series. Reference material about the program was given to patient along with our contact information.  Part #1 includes: What is COPD (how the lungs work), common treatments for COPD, the difference between \"rescue\" medications and \"daily\" medications, bronchial hygiene, and explanation of the Action Plan. We discussed appropriate medication technique along with a demonstration, and the correct way to care for and clean equipment.  Advance directives and smoking cessation were discussed as appropriate to this patient. Question and answer session followed.     COPD Screen  COPD Risk Screening  Do you have a history of COPD?: No  COPD Population Screener  During the past 4 weeks, how much did you feel short of breath?: Some of the time  Do you ever cough up any mucus or phlegm?: Yes, every day  In the past 12 months, you do less than you used to because of your breathing problems: Agree  Have you smoked at least 100 cigarettes in your entire life?: Yes  How old are you?: 60+  COPD Screening Score: 8  COPD Coordinator Recommended: Yes    COPD Assessment  COPD Clinical Specialists ONLY  COPD Education Initiated: Yes--Full Intervention (MD confirms Pt is not in COPD Exacerbation, Dx of Pneumonia w/ lung nodule.)  COPD Education Session 1: Yes (Given COPD booklet and reviewed what COPD is and how it can affect the body)  DME Company: unknown  DME Equipment Type: CPAP (owned?)  Physician Name: Cindy Busby,  Pulmonary Follow Up Appointment: 02/09/22 (Referral placed by MD, apt requested)  Appt Time: 0910  Pulmonologist Name: Matilde Barksdale M.D.  Palliative Care:  (declined)  Referrals Initiated: Yes  Pulmonary Rehab: Yes (Pt states she will need to discuss further with spouse)  Smoking Cessation: N/A (Pt quit smoking 17 months ago, " "had some weight gain, but is now loosing weight)  Hospice: N/A  Home Health Care: N/A  [facility] Out[facility]: Yes (referral sent)  [facility]: N/A  [facility]: Declined (Pt worried about cost)  Private In-Home Care Agency: N/A  Is this a COPD exacerbation patient?: No (Clarified with Dr. Pinzon, not COPD exacerbation)  $ Demo/Eval of SVN's, MDI's and Aerosols:  (Pt states she used an inhaler before, but she didn't like the way it made her feel and it was to costly)    Meds to Beds  Would the patient like to opt in for Bedside Medication Delivery at Discharge?: Yes, interested     MY COPD ACTION PLAN - No respiratory medications     It is recommended that patients and physicians /healthcare providers complete this action plan together. This plan should be discussed at each physician visit and updated as needed.    The green, yellow and red zones show groups of symptoms of COPD. This list of symptoms is not comprehensive, and you may experience other symptoms. In the \"Actions\" column, your healthcare provider has recommended actions for you to take based on your symptoms.    Patient Name: Evie Morillo   YOB: 1945   Last Updated on:     Green Zone:  I am doing well today Actions   •  Usual activitiy and exercise level •  Take daily medications   •  Usual amounts of cough and phlegm/mucus •  Use oxygen as prescribed   •  Sleep well at night •  Continue regular exercise/diet plan   •  Appetite is good •  At all times avoid cigarette smoke, inhaled irritants     Daily Medications (these medications are taken every day):                Yellow Zone:  I am having a bad day or a COPD flare Actions   •  More breathless than usual •  Continue daily medications   •  I have less energy for my daily activities •  Use quick relief inhaler as ordered   •  Increased or thicker phlegm/mucus •  Use oxygen as prescribed   •  Using quick relief inhaler/nebulizer more often •  Get plenty of rest " "  •  Swelling of ankles more than usual •  Use pursed lip breathing   •  More coughing than usual •  At all times avoid cigarette smoke, inhaled irritants   •  I feel like I have a \"chest cold\"   •  Poor sleep and my symptoms woke me up   •  My appetite is not good   •  My medicine is not helping    •  Call provider immediately if symptoms don’t improve     Continue daily medications, add rescue medications:               Medications to be used during a flare up, (as Discussed with Provider):              Red Zone:  I need urgent medical care Actions   •  Severe shortness of breath even at rest •  Call 911 or seek medical care immediately   •  Not able to do any activity because of breathing    •  Fever or shaking chills    •  Feeling confused or very drowsy     •  Chest pains    •  Coughing up blood              "

## 2021-08-14 NOTE — THERAPY
"Speech Language Pathology   Clinical Swallow Evaluation     Patient Name: Evie Morillo  AGE:  76 y.o., SEX:  female  Medical Record #: [id number]  Today's Date: 8/14/2021     Precautions  Comments: generalized weakness    Assessment    Patient is 76 y.o. female who presented 8/13/21 on direction from PCP with generalized weakness, SOB, poor oral intake, nausea/vomiting, and occasional productive sputum. PMHx includes tobacco use, aortic valve insufficency s/p AAA repair, HTN, hyperlipidemia, GERD, COPD, and recent lupus diagnosis. No hx of SLP found in this EMR.  Additional factors influencing patient status/progress: generalized weakness, pt reported 12 pound unintentional weight loss in the last 6 weeks.    CXR - 8/13/21  1.  Nodular opacity in the peripheral right midlung measuring 1.4 cm. Further evaluation with CT chest is recommended.  2.  Stable cardiomegaly.  3.  Atherosclerotic plaque.     CT-CTA Chest Pulmonary Artery W/ Recons - 8/14/21  1.  No pulmonary embolus appreciated.  2.  Patchy reticular linear opacity in the right upper lobe, could represent early Covid infiltrates or other infiltrate. Appears to correspond with area concerning for nodule on chest x-ray.  3.  Changes of emphysema.  4.  Ascending thoracic aortic aneurysm, radiographic follow-up and surveillance recommended as clinically appropriate.  5.  Atherosclerosis and atherosclerotic coronary artery disease    Clinical Swallow evaluation completed. Pt pleasant and cooperative throughout session, endorsing weakness and fatigue. Pt denied history of swallowing deficits, though stated \"about a week ago\" that she \"choked on frothy stuff that 'just came down.'\" She said she developed difficulty breathing a few days later. Pt reported this has happened one other time, though \"maybe last year.\" Pt also reported ongoing dysgeusia contributing to poor oral intake. Oral motor exam was grossly WFL, save for upper denture plate in place and missing " dentition lower. Pt assessed with self-fed PO trials of thin liquids (single and consecutive sips by cup and straw), applesauce, pudding, and saltine crackers. Pt demonstrated slow, but adequate bolus manipulation/mastication and transit. Swallow initiation appeared prompt with present hyolaryngeal elevation to palpation. No clinical s/sx aspiration with any trials. Pt reported satiety after ~7 total bites of food. Recommend regular solids and thin liquids with medications whole as tolerated. Pt would benefit from slow rate, small bites/sips. Encouraged patient to choose softer, easier to chew foods to reduce oral burden should she find she is fatigued with PO intake. Pt verbalized understanding. Pt would benefit from an RD consultation.     Plan    Recommend Speech Therapy 2 times per week until therapy goals are met for the following treatments:  Dysphagia Training.    Discharge Recommendations: Anticipate that the patient will have no further speech therapy needs after discharge from the hospital (pending clinical course)       Objective       08/14/21 0926   Vitals   O2 (LPM) 4   O2 Delivery Device Silicone Nasal Cannula   Prior Living Situation   Lives with - Patient's Self Care Capacity Spouse   Prior Level Of Function   Communication Within Functional Limits   Swallow Within Functional Limits   Dentition Poor Quality   (edentulous upper)   Dentures Uppers   Hearing Within Functional Limits for Evaluation   Hearing Aid None   Vision Within Functional Limits for Evaluation;Wears Corrective Lenses   Patient's Primary Language English   Oral Motor Eval    Is Patient Able to Complete Oral Motor Eval Yes, Within Normal Limits   Laryngeal Function   Voice Quality Within Functional Limits  (low-volume)   Volutional Cough Within Functional Limits  (? weak)   Excursion Upon Swallow Complete  (presumed)   Oral Food Presentation   Single Swallow Thin (0) Within Functional Limits   Serial Swallow Thin (0) Within  "Functional Limits   Liquidised (3) Within Functional Limits   Pureed (4) Within Functional Limits   Regular (7) Within Functional Limits   Tracheostomy   Tracheostomy  No   Dysphagia Strategies / Recommendations   Strategies / Interventions Recommended (Yes / No) Yes   Compensatory Strategies   (slow rate, small bites/sips)   Diet / Liquid Recommendation Thin (0);Regular (7)   Medication Administration  Whole with Liquid Wash   Therapy Interventions No Swallowing Intervention Required   Dysphagia Rating   Nutritional Liquid Intake Rating Scale Non thickened beverages   Nutritional Food Intake Rating Scale Total oral diet with no restrictions   Patient / Family Goals   Patient / Family Goal #1     \"I love ice water\"   Short Term Goal #1 Pt will safely consume RG7/TN0 free from s/sx aspiration or respiratory compromise with min cues for use of safe swallow strategies.         "

## 2021-08-14 NOTE — PROGRESS NOTES
[facility] Medicine Daily Progress Note    Date of Service  8/14/2021    Chief Complaint  Evie Morillo is a 76 y.o. female admitted 8/13/2021 with generalized weakness    Hospital Course  This is an 76 year old female with PMHx of hypertension, hyperlipidemia, CKD, hypothyroidism, CAD, ADRIANNA, history of TIA, , s/p AAA repair, history of tobacco use disorder (40 pack per year), thrombocytopenia and neutropenia s/p bone marrow biopsy with no evidence of hematologic disorder, positive BIRDIE (has referral to rheum, dx of lupus?) who presented to the ED on 8/13/2021, after being sent in by her PCP due to generalized weakness, poor p.o. intake, weight loss and nausea and vomiting.    Patient has been vaccinated against COVID. Swab here is negative for Covid, influenza, RSV.  CTA was ordered, negative for PE, RUL infiltrate noted, and a RML nodule measuring 1.4cm.      Patient's started on antibiotics for RUL PNA, currently on 4L NC.      Interval Problem Update  Patient reports that she is noted some weight loss since June 2021.  She reports a decrease in appetite.  Unable to quantify the amount of pounds loss.    Patient denies any prior history of pulmonary nodule.  She does admit she is hospitalized once a year for pneumonia.  Patient noted here with a right upper lobe pneumonia, currently on 3 L of oxygen.  Patient started on IV antibiotics, will wean oxygen as tolerated.    Referral placed for outpatient pulmonology, patient to have repeat imaging after pneumonia resolves to reevaluate pulmonary nodule.  Patient does have history of smoking half pack per day for 40 years.    I have personally seen and examined the patient at bedside. I discussed the plan of care with patient and bedside RN.    Consultants/Specialty  None    Code Status  Full Code    Disposition  Patient is not medically cleared.   Anticipate discharge to to home with close outpatient follow-up.  I have placed the appropriate orders for post-discharge  needs.    Review of Systems  Review of Systems   Constitutional: Positive for malaise/fatigue and weight loss.   Respiratory: Positive for cough.    All other systems reviewed and are negative.       Physical Exam  Temp:  [35.8 °C (96.5 °F)-37.9 °C (100.2 °F)] 35.9 °C (96.6 °F)  Pulse:  [] 80  Resp:  [16-23] 16  BP: (111-140)/(64-87) 113/71  SpO2:  [87 %-99 %] 92 %    Physical Exam  Vitals and nursing note reviewed.   Constitutional:       General: She is not in acute distress.     Appearance: Normal appearance.   HENT:      Head: Normocephalic and atraumatic.      Mouth/Throat:      Mouth: Mucous membranes are moist.      Pharynx: No oropharyngeal exudate.   Eyes:      Extraocular Movements: Extraocular movements intact.      Pupils: Pupils are equal, round, and reactive to light.   Cardiovascular:      Rate and Rhythm: Normal rate and regular rhythm.      Pulses: Normal pulses.      Heart sounds: No murmur heard.   No friction rub. No gallop.    Pulmonary:      Effort: Pulmonary effort is normal. No respiratory distress.      Breath sounds: No wheezing, rhonchi or rales.      Comments: Decreased breath sounds bilaterally  Abdominal:      General: Bowel sounds are normal. There is no distension.      Palpations: Abdomen is soft. There is no mass.      Tenderness: There is no abdominal tenderness.   Musculoskeletal:         General: No swelling or tenderness. Normal range of motion.      Cervical back: Normal range of motion. No rigidity. No muscular tenderness.      Right lower leg: No edema.      Left lower leg: No edema.   Skin:     General: Skin is warm and dry.      Capillary Refill: Capillary refill takes less than 2 seconds.      Findings: No erythema or rash.   Neurological:      General: No focal deficit present.      Mental Status: She is alert and oriented to person, place, and time.      Motor: No weakness.      Gait: Gait normal.         Fluids  No intake or output data in the 24 hours ending  08/14/21 1344    Laboratory  Recent Labs     08/13/21 1915   WBC 5.1   RBC 3.98*   HEMOGLOBIN 12.3   HEMATOCRIT 37.3   MCV 93.7   MCH 30.9   MCHC 33.0*   RDW 46.1   PLATELETCT 191   MPV 10.6     Recent Labs     08/13/21 1915 08/14/21  0734   SODIUM 135 134*   POTASSIUM 3.4* 4.4   CHLORIDE 100 103   CO2 22 22   GLUCOSE 109* 94   BUN 21 21   CREATININE 1.02 0.90   CALCIUM 8.8 8.4*                   Imaging  CT-CTA CHEST PULMONARY ARTERY W/ RECONS   Final Result         1.  No pulmonary embolus appreciated.   2.  Patchy reticular linear opacity in the right upper lobe, could represent early Covid infiltrates or other infiltrate. Appears to correspond with area concerning for nodule on chest x-ray.   3.  Changes of emphysema.   4.  Ascending thoracic aortic aneurysm, radiographic follow-up and surveillance recommended as clinically appropriate.   5.  Atherosclerosis and atherosclerotic coronary artery disease      DX-CHEST-PORTABLE (1 VIEW)   Final Result      1.  Nodular opacity in the peripheral right midlung measuring 1.4 cm. Further evaluation with CT chest is recommended.   2.  Stable cardiomegaly.   3.  Atherosclerotic plaque.      EC-ECHOCARDIOGRAM COMPLETE W/O CONT    (Results Pending)   US-EXTREMITY VENOUS LOWER BILAT    (Results Pending)        Assessment/Plan  * Acute respiratory failure with hypoxia (HCC)  Assessment & Plan  Likely secondary to PNA and COPD  CTA negative for PE  Venous Doppler and ECHO pending  BNP was 226    Pneumonia  Assessment & Plan  Patient has been vaccinated against COVID.   Swab here is negative for Covid, influenza, RSV. Procal negative   CTA was ordered, negative for PE, RUL infiltrate noted, and a RML nodule measuring 1.4cm.      Abx: ceftriaxone +doxycyline    - F/u BCx, sputum Cx, pneumonia workup  - SLP with no issues for diet  - RT     Pulmonary nodule  Assessment & Plan  CXR and CTA noted RML nodule measuring 1.4cm  Outpatient referral placed for pulmonology  Patient will  need repeat imaging after pneumonia resolves    ACP (advance care planning)  Assessment & Plan  Plan of care and rationale for hospitalization discussed with patient  Full CODE STATUS confirmed  ACP: 18 minutes    Moderate protein-calorie malnutrition (HCC)  Assessment & Plan  Ensure    Hyperglycemia  Assessment & Plan  F/u HbA1c    Hypokalemia  Assessment & Plan  Replace  Empiric Mg replacement    COPD (chronic obstructive pulmonary disease) ([facility])  Assessment & Plan  Not in acute exacerbation  Nebs treatment PRN, pulm toilet  Patient to follow-up with pulmonology for outpatient PFTs and to monitor pulmonary nodule    GERD (gastroesophageal reflux disease)- (present on admission)  Assessment & Plan  PPI    Former smoker- (present on admission)  Assessment & Plan  More than 40pack-year, quit smoking 2/2020    S/P AAA repair- (present on admission)  Assessment & Plan  Followed by cardiology    Stage 3 chronic kidney disease ([facility])- (present on admission)  Assessment & Plan  Minimize nephrotoxic agents  Chronic, stable    Sleep apnea- (present on admission)  Assessment & Plan  Previously on CPAP - no longer use    Chronic insomnia- (present on admission)  Assessment & Plan  Continue with patient's trazodone as needed    Hypothyroid- (present on admission)  Assessment & Plan  Continue patient's Synthroid    TIA (transient ischemic attack)- (present on admission)  Assessment & Plan  History of, continue with statin therapy  Patient reports not taking aspirin due to upset stomach    Hypertension- (present on admission)  Assessment & Plan  Continue patient's losartan, Aldactone    Dyslipidemia- (present on admission)  Assessment & Plan  Continue with statin therapy    CAD (coronary artery disease)- (present on admission)  Assessment & Plan  Continue with statin therapy  Patient reports she does not take any aspirin due to upset stomach    Patient follows with cardiology, Dr. Pulido, had a stress test July 21 which was  negative for any acute ischemia.       VTE prophylaxis: SCDs/TEDs and enoxaparin ppx    I have performed a physical exam and reviewed and updated ROS and Plan today (8/14/2021). In review of yesterday's note (8/13/2021), there are no changes except as documented above.

## 2021-08-14 NOTE — ASSESSMENT & PLAN NOTE
Likely secondary to PNA and COPD  CTA negative for PE  Venous Doppler and ECHO pending  BNP was 226

## 2021-08-14 NOTE — ASSESSMENT & PLAN NOTE
Not in acute exacerbation  Nebs treatment PRN, pulm toilet  Patient to follow-up with pulmonology for outpatient PFTs and to monitor pulmonary nodule

## 2021-08-14 NOTE — ASSESSMENT & PLAN NOTE
Patient has been vaccinated against COVID.   Swab here is negative for Covid, influenza, RSV. Procal negative   CTA was ordered, negative for PE, RUL infiltrate noted, and a RML nodule measuring 1.4cm.      Abx: ceftriaxone +doxycyline  - SLP with no issues for diet  - RT

## 2021-08-14 NOTE — ASSESSMENT & PLAN NOTE
Plan of care and rationale for hospitalization discussed with patient  Full CODE STATUS confirmed  ACP: 18 minutes

## 2021-08-14 NOTE — ED TRIAGE NOTES
"Chief Complaint   Patient presents with   • Fever     \"102\" PTA  x 9 days   • Shortness of Breath     88% on RA   • Nausea/Vomiting/Diarrhea     Sepsis score 4.  Recent lupus diagnosis. Charge RN notified.   Protocol initiated.   /79   Pulse (!) 111   Temp 36.8 °C (98.2 °F) (Oral)   Resp 18   Ht 1.702 m (5' 7\")   Wt 59.5 kg (131 lb 2.8 oz)   SpO2 95%   Pt informed of wait times. Educated on triage process.  Asked to return to triage RN for any new or worsening of symptoms. Thanked for patience.        "

## 2021-08-14 NOTE — PROGRESS NOTES
Subjective     Ibeth Morillo is a 76 y.o. female who presents with Nausea (X 2 weeks, nausea diarrhea can't eat. )            HPI  Able to drink water and eat jello cup. States able to take mprescribed meds withwater. Has nausea, fear of vomiting so rather not eat much solid foods. Had diarrhea 1x today. Ate cheese then drank water and continues to dry heave. Denies any colon abnormalities or disorders. States she has Lupus, recently seen in oncology for neutropenia last month. States weakness. Denies exposure to others with illness or COVID but  works at [employer] and goes to the [employer].  has no illness. No cough or shortness of breath, no chest pain.     PMH:  has a past medical history of AAA (abdominal aortic aneurysm) (HCC), Arthritis, CAD (coronary artery disease) (10/4/2012), Chronic insomnia (5/23/2016), Dental disorder, Depression (9/26/2013), Diverticula of colon, Environmental and seasonal allergies, High cholesterol, Hyperlipidemia (10/4/2012), Hypertension, Hypothyroid (10/17/2013), Nonrheumatic aortic valve insufficiency, Postural hypotension (10/7/2014), Sleep apnea, TIA (transient ischemic attack) (10/04/2012), and Tobacco abuse (4/20/2016).  MEDS:   Current Outpatient Medications:   •  Multiple Vitamins-Minerals (DAILY MULTIVITAMIN PO), Take 1 tablet by mouth every day., Disp: , Rfl:   •  losartan (COZAAR) 100 MG Tab, Take 0.5 Tablets by mouth every day., Disp: 90 tablet, Rfl: 1  •  spironolactone (ALDACTONE) 50 MG Tab, TAKE 1 TABLET BY MOUTH DAILY, Disp: 100 tablet, Rfl: 2  •  levothyroxine (SYNTHROID) 88 MCG Tab, Take 1 tablet by mouth every morning on an empty stomach., Disp: 90 tablet, Rfl: 3  •  atorvastatin (LIPITOR) 80 MG tablet, Take 1 Tab by mouth every day., Disp: 90 Tab, Rfl: 3  •  traZODone (DESYREL) 150 MG Tab, TAKE ONE TABLET BY MOUTH TWICE A DAY, Disp: 180 Tab, Rfl: 2  •  aspirin (ASA) 325 MG TABS, Take 325 mg by mouth every day. (Patient not taking: Reported on  "7/19/2021), Disp: , Rfl:   ALLERGIES:   Allergies   Allergen Reactions   • Pcn [Penicillins] Shortness of Breath, Rash and Swelling     .     SURGHX:   Past Surgical History:   Procedure Laterality Date   • IL DX BONE MARROW ASPIRATIONS Bilateral 7/14/2021    Procedure: ASPIRATION, BONE MARROW - VEGA;  Surgeon: Jin Conrad M.D.;  Location: ENDOSCOPY [facility];  Service: Orthopedics   • IL DX BONE MARROW BIOPSIES Bilateral 7/14/2021    Procedure: BIOPSY, BONE MARROW, USING NEEDLE OR TROCAR;  Surgeon: Jin Conrad M.D.;  Location: ENDOSCOPY [facility];  Service: Orthopedics   • AAA WITH STENT GRAFT  02/2020   • STENT PLACEMENT  2008    x 2 Dr [place] in [place]   • OTHER Left     plantar wart removed on left foot   • OTHER      throat polyps removed (non cancerous) > 20 years ago     SOCHX:  reports that she quit smoking about 17 months ago. Her smoking use included cigarettes. She has a 10.00 pack-year smoking history. She has never used smokeless tobacco. She reports previous alcohol use. She reports previous drug use.  FH: Family history was reviewed, no pertinent findings to report    Review of Systems   Constitutional: Positive for malaise/fatigue. Negative for chills and fever.   Respiratory: Negative for shortness of breath.    Cardiovascular: Negative for chest pain, palpitations and orthopnea.   Gastrointestinal: Positive for nausea and vomiting. Negative for abdominal pain, constipation and diarrhea.   Genitourinary: Negative for dysuria, flank pain, frequency, hematuria and urgency.   Musculoskeletal: Negative for myalgias.   Neurological: Positive for weakness. Negative for dizziness and headaches.   All other systems reviewed and are negative.             Objective     /72   Pulse (!) 110   Temp 37.9 °C (100.2 °F) (Temporal)   Resp 16   Ht 1.689 m (5' 6.5\")   Wt 59.6 kg (131 lb 6.4 oz)   LMP  (LMP Unknown)   SpO2 88%   BMI 20.89 kg/m²      Physical Exam  Vitals reviewed. "   Constitutional:       General: She is awake. She is not in acute distress.     Appearance: Normal appearance. She is well-developed. She is not ill-appearing, toxic-appearing or diaphoretic.   HENT:      Head: Normocephalic.   Eyes:      Conjunctiva/sclera: Conjunctivae normal.      Pupils: Pupils are equal, round, and reactive to light.   Cardiovascular:      Rate and Rhythm: Regular rhythm. Tachycardia present.   Pulmonary:      Effort: Pulmonary effort is normal. No tachypnea, accessory muscle usage or respiratory distress.      Breath sounds: Decreased air movement present.   Abdominal:      General: Bowel sounds are decreased. There is no distension.      Palpations: Abdomen is soft.      Tenderness: There is no abdominal tenderness. There is no guarding or rebound.   Musculoskeletal:         General: Normal range of motion.   Skin:     General: Skin is warm and dry.   Neurological:      Mental Status: She is alert and oriented to person, place, and time.      GCS: GCS eye subscore is 4. GCS verbal subscore is 5. GCS motor subscore is 6.      Cranial Nerves: Cranial nerves are intact.      Sensory: Sensation is intact.      Motor: Motor function is intact.      Coordination: Coordination is intact.      Gait: Gait is intact.   Psychiatric:         Attention and Perception: Attention normal.         Mood and Affect: Mood normal.         Speech: Speech normal.         Behavior: Behavior normal. Behavior is cooperative.         Thought Content: Thought content normal.         Cognition and Memory: Cognition and memory normal.         Judgment: Judgment normal.                             Assessment & Plan        1. Nausea      2. Fever, unspecified fever cause    3. Low oxygen saturation      4. Weakness    -Refer to Emergency Room for further evaluation for inability to take ion food without nausea and dry heaving, low oxygen, fever, weakness. Patient need of higher level of care  -Patient stable, vitals  stable  -Patient to go to ER with  POV

## 2021-08-14 NOTE — DIETARY
Nutrition Services: Day 1 of admit. Evie Morillo is a 76 y.o. female with admitting DX of pneumonia.  Consult received for poor oral intake, 2-13 lb weight loss over 1 month, and decreased appetite.     Pt reports losing 12 lbs over 1.5 months. Pt recalls weighing 140 lbs in January 2021 and 138 lbs a couple weeks ago. Prior to admission, pt reports eating 25% of baseline PO intake for the last 3-4 weeks. At this time, appetite is good but pt reports problems with diarrhea.     Nutrition focused physical exam performed. Pt noted with hollowing at temples, sunken eyes, squared shoulder, protruding clavicle, depression in dorsal hand, and squared knee. Posterior calf region appeared well-nourished while bicep had excess skin and was difficult to discern fat stores. This RD questions if physical findings are secondary to age-related sacropenia.     Assessment:  Ht: 170.2 cm, Wt 8/14: 59.5 kg (131 lb 2.8 oz) via stand up scale, BMI: 20.54 - normal  Diet/Intake: cardiac + Boost Plus TID - Per chart pt PO 50-75% for 1 meal     Evaluation:   1. History of AAA, CAD, diverticula of colon, high cholesterol, HLD, HTN, TIA.   2. Weight was 63 kg on 7/14/2021 per chart review. 6% weight loss over 1 month (severe).   3. Labs: sodium 134  4. Meds: synthroid, aldactone    Malnutrition Risk: Pt with severe malnutrition in the context of acute illness related to generalized weakness as evidenced by severe weight loss per chart review and consuming 25% of baseline PO intake for the last 3-4 weeks per pt report.    Recommendations/Plan:  1. Continue Boost Plus TID.   2. Encourage intake of meals/supplements.  3. Document intake of all meals/supplements as % taken in ADL's to provide interdisciplinary communication across all shifts.   4. Recommended imodium or lomotil to combat diarrhea OR probiotic in between abx doses to MD.  5. Monitor weight.  6. Nutrition rep will continue to see patient for ongoing meal and snack  preferences.    RD following.

## 2021-08-14 NOTE — PROGRESS NOTES
4 Eyes Skin Assessment Completed by Harini Woo, RN and IVANA Wellington.    Head WDL  Ears WDL  Nose WDL  Mouth WDL  Neck WDL  Breast/Chest WDL  Shoulder Blades WDL  Spine WDL  (R) Arm/Elbow/Hand WDL  (L) Arm/Elbow/Hand WDL  Abdomen WDL  Groin WDL  Scrotum/Coccyx/Buttocks WDL  (R) Leg WDL  (L) Leg WDL  (R) Heel/Foot/Toe Redness, Blanching and Boggy  (L) Heel/Foot/Toe Redness, Blanching and Boggy          Devices In Places Tele Box and Nasal Cannula      Interventions In Place Gray Ear Foams, Pillows and Pressure Redistribution Mattress    Possible Skin Injury No    Pictures Uploaded Into Epic N/A  Wound Consult Placed N/A  RN Wound Prevention Protocol Ordered No

## 2021-08-14 NOTE — HOSPITAL COURSE
This is an 76 year old female with PMHx of hypertension, hyperlipidemia, CKD, hypothyroidism, CAD, ADRIANNA, history of TIA, , s/p AAA repair, history of tobacco use disorder (40 pack per year), thrombocytopenia and neutropenia s/p bone marrow biopsy with no evidence of hematologic disorder, positive BIRDIE (has referral to rheum, dx of lupus?) who presented to the ED on 8/13/2021, after being sent in by her PCP due to generalized weakness, poor p.o. intake, weight loss and nausea and vomiting.    Patient has been vaccinated against COVID. Swab here is negative for Covid, influenza, RSV.  CTA was ordered, negative for PE, RUL infiltrate noted, and a RML nodule measuring 1.4cm.  Patient will need to have a repeat imaging outpatient after pneumonia resolves, to monitor this nodule.  Referral for outpatient pulmonology placed, as patient has not had prior PFTs and to monitor this pulmonary nodule.    Patient's started on antibiotics for RUL PNA.  She will be discharged home with home oxygen and a prescription for antibiotics to complete a 7-day course.     Patient is to follow-up with a primary care physician, she has been provided a referral for pulmonology for repeat CT imaging in a few weeks to evaluate pulmonary nodule.

## 2021-08-14 NOTE — H&P
"Hospital Medicine History & Physical Note    Date of Service  8/14/2021    Primary Care Physician  CLIFTON Mercedes    Consultants  None    Code Status  Full Code    Chief Complaint  Chief Complaint   Patient presents with   • Fever     \"102\" PTA  x 9 days   • Shortness of Breath     88% on RA   • Nausea/Vomiting/Diarrhea       History of Presenting Illness  Evie Morillo is a 76 y.o. female with history of at least 40-pack-year tobacco , low ANC were negative JAK2 mutation as well as negative bone marrow biopsy work-up 07/2021, aortic valve insufficiency, status post AAA repair, hypertension, hyperlipidemia who presented 8/13/2021 with complaint of generalized weakness, sent by PCP for further evaluation.  Patient reported having poor oral intake, somewhat over weight loss, nausea vomiting and occasional productive sputum.  She has subjective chills and generalized malaise.  She reported she has been diagnosed with lupus recently, labs from 6/29/21 suggestive of lupus with +BIRDIE and predominantly elevated double strand DNA. She was sent to ER by PCP for persistent weakness and SOB.    In ER, she was found to be hypoxic requiring 4 L oxygen.  Covid is negative and she has been vaccinated.  CXR noted nodular opacity in RML up to 1.4 cm.  CTA chest showed no PE but did note patchy RUL consolidation/nodule coinciding to CXR.  I am being asked to admit patient for evaluation of pneumonia and hypoxic respiratory failure.  Admitted to medicine service for further evaluation and treatment.    ED course: Ceftriaxone 1 g    I discussed the plan of care with patient and bedside RN.    Review of Systems  Review of Systems   Constitutional: Positive for chills, fever, malaise/fatigue and weight loss. Negative for diaphoresis.   HENT: Negative for hearing loss and tinnitus.    Eyes: Negative for blurred vision and double vision.   Respiratory: Positive for cough, sputum production, shortness of breath and " wheezing. Negative for hemoptysis.    Cardiovascular: Negative for chest pain and leg swelling.   Gastrointestinal: Positive for diarrhea, nausea and vomiting. Negative for abdominal pain, blood in stool, constipation and heartburn.   Genitourinary: Negative for dysuria and flank pain.   Musculoskeletal: Negative for falls and myalgias.   Skin: Negative for itching and rash.   Neurological: Positive for weakness (generalized). Negative for dizziness and headaches.   Endo/Heme/Allergies: Negative for environmental allergies. Does not bruise/bleed easily.   Psychiatric/Behavioral: Negative for depression and suicidal ideas.   All other systems reviewed and are negative.      Past Medical History   has a past medical history of AAA (abdominal aortic aneurysm) ([facility]), Arthritis, CAD (coronary artery disease) (10/4/2012), Chronic insomnia (5/23/2016), Dental disorder, Depression (9/26/2013), Diverticula of colon, Environmental and seasonal allergies, High cholesterol, Hyperlipidemia (10/4/2012), Hypertension, Hypothyroid (10/17/2013), Lupus ([facility]), Nonrheumatic aortic valve insufficiency, Postural hypotension (10/7/2014), Sleep apnea, TIA (transient ischemic attack) (10/04/2012), and Tobacco abuse (4/20/2016).    Surgical History   has a past surgical history that includes stent placement (2008); other (Left); other; aaa with stent graft (02/2020); pr dx bone marrow aspirations (Bilateral, 7/14/2021); and pr dx bone marrow biopsies (Bilateral, 7/14/2021).     Family History  family history includes Cancer in her maternal grandfather and maternal grandmother; Diabetes in her maternal grandmother; Heart Attack in her father and mother; Heart Disease in her father and mother; Stroke in her paternal grandmother.   Family history reviewed with patient. There is no family history that is pertinent to the chief complaint.     Social History   reports that she quit smoking about 17 months ago. Her smoking use included cigarettes.  She has a 10.00 pack-year smoking history. She has never used smokeless tobacco. She reports previous alcohol use. She reports previous drug use.    Allergies  Allergies   Allergen Reactions   • Pcn [Penicillins] Shortness of Breath, Rash and Swelling             Medications  Prior to Admission Medications   Prescriptions Last Dose Informant Patient Reported? Taking?   Multiple Vitamins-Minerals (DAILY MULTIVITAMIN PO) 8/13/2021 at 0900 Patient Yes No   Sig: Take 1 Tablet by mouth every morning.   atorvastatin (LIPITOR) 80 MG tablet 8/12/2021 at HS Patient No No   Sig: Take 1 Tab by mouth every day.   levothyroxine (SYNTHROID) 88 MCG Tab 8/13/2021 at 0800 Patient No No   Sig: Take 1 tablet by mouth every morning on an empty stomach.   losartan (COZAAR) 100 MG Tab 8/13/2021 at 0900 Patient No No   Sig: Take 0.5 Tablets by mouth every day.   spironolactone (ALDACTONE) 50 MG Tab 8/13/2021 at 0900 Patient Yes Yes   Sig: Take 50 mg by mouth every morning.   traZODone (DESYREL) 150 MG Tab 8/12/2021 at HS Patient Yes Yes   Sig: Take 150 mg by mouth at bedtime.      Facility-Administered Medications: None       Physical Exam  Temp:  [36.8 °C (98.2 °F)-37.9 °C (100.2 °F)] 36.8 °C (98.2 °F)  Pulse:  [] 62  Resp:  [16-23] 19  BP: (111-128)/(69-87) 115/69  SpO2:  [87 %-99 %] 94 %    Physical Exam  Vitals and nursing note reviewed.   Constitutional:       Appearance: She is ill-appearing.      Comments: frail   HENT:      Head: Normocephalic and atraumatic.      Nose: Nose normal.      Mouth/Throat:      Mouth: Mucous membranes are dry.      Pharynx: Oropharynx is clear.   Eyes:      General: No scleral icterus.     Extraocular Movements: Extraocular movements intact.   Cardiovascular:      Rate and Rhythm: Normal rate and regular rhythm.      Pulses: Normal pulses.      Heart sounds: No friction rub.   Pulmonary:      Comments: 2-4L NC  Mild inspiratory Rales  Decreased bibasilar breath sounds  Minimal end expiratory  wheezing  Abdominal:      General: There is no distension.      Palpations: Abdomen is soft.      Tenderness: There is no abdominal tenderness. There is no guarding or rebound.   Musculoskeletal:         General: No swelling or tenderness. Normal range of motion.      Cervical back: Neck supple. No tenderness.   Skin:     General: Skin is warm and dry.      Capillary Refill: Capillary refill takes less than 2 seconds.   Neurological:      General: No focal deficit present.      Mental Status: She is alert and oriented to person, place, and time.      Motor: No weakness.   Psychiatric:         Mood and Affect: Mood normal.         Thought Content: Thought content normal.         Laboratory:  Recent Labs     08/13/21 1915   WBC 5.1   RBC 3.98*   HEMOGLOBIN 12.3   HEMATOCRIT 37.3   MCV 93.7   MCH 30.9   MCHC 33.0*   RDW 46.1   PLATELETCT 191   MPV 10.6     Recent Labs     08/13/21 1915   SODIUM 135   POTASSIUM 3.4*   CHLORIDE 100   CO2 22   GLUCOSE 109*   BUN 21   CREATININE 1.02   CALCIUM 8.8     Recent Labs     08/13/21 1915   ALTSGPT 32   ASTSGOT 73*   ALKPHOSPHAT 95   TBILIRUBIN 0.9   GLUCOSE 109*         Recent Labs     08/13/21 1915   NTPROBNP 226*         Recent Labs     08/13/21 1915   TROPONINT 14       Imaging:  CT-CTA CHEST PULMONARY ARTERY W/ RECONS   Final Result         1.  No pulmonary embolus appreciated.   2.  Patchy reticular linear opacity in the right upper lobe, could represent early Covid infiltrates or other infiltrate. Appears to correspond with area concerning for nodule on chest x-ray.   3.  Changes of emphysema.   4.  Ascending thoracic aortic aneurysm, radiographic follow-up and surveillance recommended as clinically appropriate.   5.  Atherosclerosis and atherosclerotic coronary artery disease      DX-CHEST-PORTABLE (1 VIEW)   Final Result      1.  Nodular opacity in the peripheral right midlung measuring 1.4 cm. Further evaluation with CT chest is recommended.   2.  Stable  cardiomegaly.   3.  Atherosclerotic plaque.      EC-ECHOCARDIOGRAM COMPLETE W/O CONT    (Results Pending)   US-EXTREMITY VENOUS LOWER BILAT    (Results Pending)                 X-Ray:  I have personally reviewed the images and compared with prior images.  EKG:  I have personally reviewed the images and compared with prior images.    Assessment/Plan:  I anticipate this patient will require at least two midnights for appropriate medical management, necessitating inpatient admission.    * Pneumonia  Assessment & Plan  CXR: nodular opacity in RML up to 1.4 cm  CTA chest: showed no PE but did note patchy RUL consolidation/nodule coinciding to CXR  COVID negative, vaccinated    PCN allergy  Abx: ceftriaxone + Flagyl + doxycyline  Speech eval  F/u BCx, sputum Cx, procal    Pulmonary nodule  Assessment & Plan  F/u with repeat CT chest for resolution after antibiotic treatment  Will need outpt f/u    Acute respiratory failure with hypoxia (HCC)  Assessment & Plan  Likely secondary to PNA and COPD  Elevated D-dimer -- CTA PE negative PE, f/u LE doppler  F/u echo -- diuresis if needed    Acute exacerbation of chronic obstructive pulmonary disease (COPD) (HCC)  Assessment & Plan  Likely secondary to PNA  abx as noted in PNA  Nebs treatment, pulm toilet  Wean off solumederol    Moderate protein-calorie malnutrition (HCC)  Assessment & Plan  Ensure    ACP (advance care planning)  Assessment & Plan  Plan of care and rationale for hospitalization discussed with patient  Full CODE STATUS confirmed  ACP: 18 minutes    Hyperglycemia  Assessment & Plan  F/u HbA1c    Hypokalemia  Assessment & Plan  Replace  Empiric Mg replacement    GERD (gastroesophageal reflux disease)- (present on admission)  Assessment & Plan  PPI    Former smoker- (present on admission)  Assessment & Plan  More than 40pack-year, quit smoking last year    Nonrheumatic aortic valve insufficiency- (present on admission)  Assessment & Plan  F/u echo    S/P AAA repair-  (present on admission)  Assessment & Plan  Followed by cardiology    Stage 3 chronic kidney disease (HCC)- (present on admission)  Assessment & Plan  Minimize nephrotoxic agents    Sleep apnea- (present on admission)  Assessment & Plan  Previously on CPAP - no longer use    Chronic insomnia- (present on admission)  Assessment & Plan  trazodone    Hypothyroid- (present on admission)  Assessment & Plan  Synthroid    TIA (transient ischemic attack)- (present on admission)  Assessment & Plan  H/o -- ASA/statin    Hypertension- (present on admission)  Assessment & Plan  ACEI, spironolactone    Dyslipidemia- (present on admission)  Assessment & Plan  statin    CAD (coronary artery disease)- (present on admission)  Assessment & Plan  ASA/statin      VTE prophylaxis: heparin ppx

## 2021-08-14 NOTE — CARE PLAN
The patient is Watcher - Medium risk of patient condition declining or worsening    Shift Goals  Clinical Goals: Monitor O2 needs  Patient Goals: Rest  Family Goals: NA    Progress made toward(s) clinical / shift goals:    Problem: Care Map:  Optimal Outcome for the Pneumonia Patient  Goal: Collection and monitoring of appropriate tests and labs  Outcome: Progressing     Problem: Respiratory  Goal: Patient will achieve/maintain optimum respiratory ventilation and gas exchange  Outcome: Progressing     Problem: Risk for Aspiration  Goal: Patient's risk for aspiration will be absent or decrease  Outcome: Progressing     Problem: Hemodynamics - Pneumonia  Goal: Patient's hemodynamics, fluid balance and neurologic status will be stable or improve  Outcome: Progressing     Problem: Self Care  Goal: Patient will have the ability to perform ADLs independently or with assistance (bathe, groom, dress, toilet and feed)  Outcome: Progressing     Problem: Discharge Planning - Pneumonia  Goal: Patient will verbalize understanding of lifestyle changes and therapeutic needs post discharge  Outcome: Progressing     Problem: Knowledge Deficit - Standard  Goal: Patient and family/care givers will demonstrate understanding of plan of care, disease process/condition, diagnostic tests and medications  Outcome: Progressing     Problem: Knowledge Deficit - COPD  Goal: Patient/significant other demonstrates understanding of disease process, utilization of the Action Plan, medications and discharge instruction  Outcome: Progressing     Problem: Risk for Infection - COPD  Goal: Patient will remain free from signs and symptoms of infection  Outcome: Progressing     Problem: Nutrition - Advanced  Goal: Patient will display progressive weight gain toward goal have adequate food and fluid intake  Outcome: Progressing     Problem: Ineffective Airway Clearance  Goal: Patient will maintain patent airway with clear/clearing breath sounds  Outcome:  Progressing     Problem: Impaired Gas Exchange  Goal: Patient will demonstrate improved ventilation and adequate oxygenation and participate in treatment regimen within the level of ability/situation.  Outcome: Progressing     Problem: Hemodynamics  Goal: Patient's hemodynamics, fluid balance and neurologic status will be stable or improve  Outcome: Progressing     Problem: Fluid Volume  Goal: Fluid volume balance will be maintained  Outcome: Progressing     Problem: Urinary - Renal Perfusion  Goal: Ability to achieve and maintain adequate renal perfusion and functioning will improve  Outcome: Progressing     Problem: Mechanical Ventilation  Goal: Safe management of artificial airway and ventilation  Outcome: Progressing  Goal: Successful weaning off mechanical ventilator, spontaneously maintains adequate gas exchange  Outcome: Progressing  Goal: Patient will be able to express needs and understand communication  Outcome: Progressing     Problem: Physical Regulation  Goal: Diagnostic test results will improve  Outcome: Progressing  Goal: Signs and symptoms of infection will decrease  Outcome: Progressing       Patient is not progressing towards the following goals:

## 2021-08-14 NOTE — ASSESSMENT & PLAN NOTE
CXR and CTA noted RML nodule measuring 1.4cm  Outpatient referral placed for pulmonology  Patient will need repeat imaging after pneumonia resolves

## 2021-08-15 ENCOUNTER — APPOINTMENT (OUTPATIENT)
Dept: RADIOLOGY | Facility: MEDICAL CENTER | Age: 76
DRG: 193 | End: 2021-08-15
Attending: STUDENT IN AN ORGANIZED HEALTH CARE EDUCATION/TRAINING PROGRAM
Payer: MEDICARE

## 2021-08-15 LAB
ANION GAP SERPL CALC-SCNC: 10 MMOL/L (ref 7–16)
BUN SERPL-MCNC: 25 MG/DL (ref 8–22)
CALCIUM SERPL-MCNC: 9.4 MG/DL (ref 8.5–10.5)
CHLORIDE SERPL-SCNC: 106 MMOL/L (ref 96–112)
CO2 SERPL-SCNC: 21 MMOL/L (ref 20–33)
CREAT SERPL-MCNC: 0.89 MG/DL (ref 0.5–1.4)
ERYTHROCYTE [DISTWIDTH] IN BLOOD BY AUTOMATED COUNT: 46.4 FL (ref 35.9–50)
GLUCOSE SERPL-MCNC: 126 MG/DL (ref 65–99)
HCT VFR BLD AUTO: 32.6 % (ref 37–47)
HGB BLD-MCNC: 10.6 G/DL (ref 12–16)
LV EJECT FRACT MOD 2C 99903: 47.24
LV EJECT FRACT MOD 4C 99902: 51.05
LV EJECT FRACT MOD BP 99901: 47.9
MAGNESIUM SERPL-MCNC: 2 MG/DL (ref 1.5–2.5)
MCH RBC QN AUTO: 30.9 PG (ref 27–33)
MCHC RBC AUTO-ENTMCNC: 32.5 G/DL (ref 33.6–35)
MCV RBC AUTO: 95 FL (ref 81.4–97.8)
PLATELET # BLD AUTO: 164 K/UL (ref 164–446)
PMV BLD AUTO: 10.6 FL (ref 9–12.9)
POTASSIUM SERPL-SCNC: 4.3 MMOL/L (ref 3.6–5.5)
RBC # BLD AUTO: 3.43 M/UL (ref 4.2–5.4)
SODIUM SERPL-SCNC: 137 MMOL/L (ref 135–145)
WBC # BLD AUTO: 5.2 K/UL (ref 4.8–10.8)

## 2021-08-15 PROCEDURE — 99233 SBSQ HOSP IP/OBS HIGH 50: CPT | Performed by: GENERAL PRACTICE

## 2021-08-15 PROCEDURE — 700111 HCHG RX REV CODE 636 W/ 250 OVERRIDE (IP): Performed by: STUDENT IN AN ORGANIZED HEALTH CARE EDUCATION/TRAINING PROGRAM

## 2021-08-15 PROCEDURE — 94669 MECHANICAL CHEST WALL OSCILL: CPT

## 2021-08-15 PROCEDURE — 94760 N-INVAS EAR/PLS OXIMETRY 1: CPT

## 2021-08-15 PROCEDURE — 85027 COMPLETE CBC AUTOMATED: CPT

## 2021-08-15 PROCEDURE — 83735 ASSAY OF MAGNESIUM: CPT

## 2021-08-15 PROCEDURE — 93306 TTE W/DOPPLER COMPLETE: CPT | Mod: 26 | Performed by: INTERNAL MEDICINE

## 2021-08-15 PROCEDURE — 700102 HCHG RX REV CODE 250 W/ 637 OVERRIDE(OP): Performed by: GENERAL PRACTICE

## 2021-08-15 PROCEDURE — 770020 HCHG ROOM/CARE - TELE (206)

## 2021-08-15 PROCEDURE — 700105 HCHG RX REV CODE 258: Performed by: STUDENT IN AN ORGANIZED HEALTH CARE EDUCATION/TRAINING PROGRAM

## 2021-08-15 PROCEDURE — 80048 BASIC METABOLIC PNL TOTAL CA: CPT

## 2021-08-15 PROCEDURE — 93970 EXTREMITY STUDY: CPT

## 2021-08-15 PROCEDURE — 700102 HCHG RX REV CODE 250 W/ 637 OVERRIDE(OP): Performed by: STUDENT IN AN ORGANIZED HEALTH CARE EDUCATION/TRAINING PROGRAM

## 2021-08-15 PROCEDURE — A9270 NON-COVERED ITEM OR SERVICE: HCPCS | Performed by: STUDENT IN AN ORGANIZED HEALTH CARE EDUCATION/TRAINING PROGRAM

## 2021-08-15 PROCEDURE — 36415 COLL VENOUS BLD VENIPUNCTURE: CPT

## 2021-08-15 PROCEDURE — 700111 HCHG RX REV CODE 636 W/ 250 OVERRIDE (IP): Performed by: GENERAL PRACTICE

## 2021-08-15 PROCEDURE — A9270 NON-COVERED ITEM OR SERVICE: HCPCS | Performed by: GENERAL PRACTICE

## 2021-08-15 PROCEDURE — 700101 HCHG RX REV CODE 250: Performed by: STUDENT IN AN ORGANIZED HEALTH CARE EDUCATION/TRAINING PROGRAM

## 2021-08-15 RX ORDER — SODIUM CHLORIDE 9 MG/ML
INJECTION, SOLUTION INTRAVENOUS
Status: ACTIVE
Start: 2021-08-15 | End: 2021-08-15

## 2021-08-15 RX ORDER — LEVOFLOXACIN 500 MG/1
500 TABLET, FILM COATED ORAL DAILY
Qty: 4 TABLET | Refills: 0 | Status: SHIPPED | OUTPATIENT
Start: 2021-08-17 | End: 2021-08-21

## 2021-08-15 RX ORDER — CEFTRIAXONE 1 G/1
INJECTION, POWDER, FOR SOLUTION INTRAMUSCULAR; INTRAVENOUS
Status: ACTIVE
Start: 2021-08-15 | End: 2021-08-15

## 2021-08-15 RX ORDER — ALBUTEROL SULFATE 90 UG/1
2 AEROSOL, METERED RESPIRATORY (INHALATION) EVERY 6 HOURS PRN
Qty: 8.5 G | Refills: 0 | Status: SHIPPED | OUTPATIENT
Start: 2021-08-15 | End: 2021-08-16

## 2021-08-15 RX ORDER — DOXYCYCLINE 100 MG/1
100 TABLET ORAL EVERY 12 HOURS
Status: DISCONTINUED | OUTPATIENT
Start: 2021-08-15 | End: 2021-08-16 | Stop reason: HOSPADM

## 2021-08-15 RX ADMIN — ENOXAPARIN SODIUM 40 MG: 40 INJECTION SUBCUTANEOUS at 04:42

## 2021-08-15 RX ADMIN — LOSARTAN POTASSIUM 100 MG: 50 TABLET, FILM COATED ORAL at 06:33

## 2021-08-15 RX ADMIN — DOXYCYCLINE 100 MG: 100 TABLET, FILM COATED ORAL at 17:46

## 2021-08-15 RX ADMIN — ATORVASTATIN CALCIUM 80 MG: 80 TABLET, FILM COATED ORAL at 21:40

## 2021-08-15 RX ADMIN — TRAZODONE HYDROCHLORIDE 100 MG: 100 TABLET ORAL at 21:40

## 2021-08-15 RX ADMIN — DOXYCYCLINE 100 MG: 100 INJECTION, POWDER, LYOPHILIZED, FOR SOLUTION INTRAVENOUS at 08:39

## 2021-08-15 RX ADMIN — SPIRONOLACTONE 50 MG: 25 TABLET ORAL at 06:33

## 2021-08-15 RX ADMIN — CEFTRIAXONE SODIUM 1 G: 1 INJECTION, POWDER, FOR SOLUTION INTRAMUSCULAR; INTRAVENOUS at 04:42

## 2021-08-15 RX ADMIN — LEVOTHYROXINE SODIUM 88 MCG: 0.09 TABLET ORAL at 04:42

## 2021-08-15 ASSESSMENT — ENCOUNTER SYMPTOMS
COUGH: 1
WEIGHT LOSS: 1

## 2021-08-15 ASSESSMENT — PAIN DESCRIPTION - PAIN TYPE: TYPE: ACUTE PAIN

## 2021-08-15 ASSESSMENT — FIBROSIS 4 INDEX: FIB4 SCORE: 6.14

## 2021-08-15 NOTE — FACE TO FACE
Face to Face Note  -  Durable Medical Equipment    Urvashi Pinzon D.O. - NPI: [id number]  I certify that this patient is under my care and that they had a durable medical equipment(DME)face to face encounter by myself that meets the physician DME face-to-face encounter requirements with this patient on:    Date of encounter:   Patient:                    MRN:                       YOB: 2023  Evie Morillo  [id number]  1945     The encounter with the patient was in whole, or in part, for the following medical condition, which is the primary reason for durable medical equipment:  COPD    I certify that, based on my findings, the following durable medical equipment is medically necessary:  Oxygen.    HOME O2 Saturation Measurements:(Values must be present for Home Oxygen orders)  Room air sat at rest: 95  Room air sat with amb: 87  With liters of O2: 2, O2 sat at rest with O2: 97  With Liters of O2: 2, O2 sat with amb with O2 : 91  Is the patient mobile?: Yes    My Clinical findings support the need for the above equipment due to:  Hypoxia    Supporting Symptoms: N/A    If patient feels more short of breath, they can go up to 6 liters per minute and contact healthcare provider.

## 2021-08-15 NOTE — CARE PLAN
The patient is Stable - Low risk of patient condition declining or worsening    Shift Goals  Clinical Goals: Rest, monitor O2 needs  Patient Goals: rest  Family Goals: NA    Progress made toward(s) clinical / shift goals:    Problem: Care Map:  Optimal Outcome for the Pneumonia Patient  Goal: Collection and monitoring of appropriate tests and labs  Outcome: Progressing     Problem: Respiratory  Goal: Patient will achieve/maintain optimum respiratory ventilation and gas exchange  Outcome: Progressing     Problem: Risk for Aspiration  Goal: Patient's risk for aspiration will be absent or decrease  Outcome: Progressing     Problem: Hemodynamics - Pneumonia  Goal: Patient's hemodynamics, fluid balance and neurologic status will be stable or improve  Outcome: Progressing     Problem: Discharge Planning - Pneumonia  Goal: Patient will verbalize understanding of lifestyle changes and therapeutic needs post discharge  Outcome: Progressing     Problem: Knowledge Deficit - Standard  Goal: Patient and family/care givers will demonstrate understanding of plan of care, disease process/condition, diagnostic tests and medications  Outcome: Progressing     Problem: Knowledge Deficit - COPD  Goal: Patient/significant other demonstrates understanding of disease process, utilization of the Action Plan, medications and discharge instruction  Outcome: Progressing     Problem: Risk for Infection - COPD  Goal: Patient will remain free from signs and symptoms of infection  Outcome: Progressing     Problem: Nutrition - Advanced  Goal: Patient will display progressive weight gain toward goal have adequate food and fluid intake  Outcome: Progressing     Problem: Ineffective Airway Clearance  Goal: Patient will maintain patent airway with clear/clearing breath sounds  Outcome: Progressing     Problem: Impaired Gas Exchange  Goal: Patient will demonstrate improved ventilation and adequate oxygenation and participate in treatment regimen within  the level of ability/situation.  Outcome: Progressing     Problem: Hemodynamics  Goal: Patient's hemodynamics, fluid balance and neurologic status will be stable or improve  Outcome: Progressing     Problem: Fluid Volume  Goal: Fluid volume balance will be maintained  Outcome: Progressing     Problem: Urinary - Renal Perfusion  Goal: Ability to achieve and maintain adequate renal perfusion and functioning will improve  Outcome: Progressing     Problem: Mechanical Ventilation  Goal: Safe management of artificial airway and ventilation  Outcome: Progressing  Goal: Successful weaning off mechanical ventilator, spontaneously maintains adequate gas exchange  Outcome: Progressing  Goal: Patient will be able to express needs and understand communication  Outcome: Progressing     Problem: Physical Regulation  Goal: Diagnostic test results will improve  Outcome: Progressing  Goal: Signs and symptoms of infection will decrease  Outcome: Progressing

## 2021-08-15 NOTE — PROGRESS NOTES
[facility] Medicine Daily Progress Note    Date of Service  8/15/2021    Chief Complaint  Evie Morillo is a 76 y.o. female admitted 8/13/2021 with generalized weakness    Hospital Course  This is an 76 year old female with PMHx of hypertension, hyperlipidemia, CKD, hypothyroidism, CAD, ADRIANNA, history of TIA, , s/p AAA repair, history of tobacco use disorder (40 pack per year), thrombocytopenia and neutropenia s/p bone marrow biopsy with no evidence of hematologic disorder, positive BIRDIE (has referral to rheum, dx of lupus?) who presented to the ED on 8/13/2021, after being sent in by her PCP due to generalized weakness, poor p.o. intake, weight loss and nausea and vomiting.    Patient has been vaccinated against COVID. Swab here is negative for Covid, influenza, RSV.  CTA was ordered, negative for PE, RUL infiltrate noted, and a RML nodule measuring 1.4cm.  Patient will need to have a repeat imaging outpatient after pneumonia resolves, to monitor this nodule.  Referral for outpatient pulmonology placed, as patient has not had prior PFTs and to monitor this pulmonary nodule.    Patient's started on antibiotics for RUL PNA.      Interval Problem Update  Patient reports her breathing has much improved, home O2 evaluation performed, she does require oxygen mainly on exertion.  Oxygen orders placed, CM aware.    Patient reported 1 episode of diarrhea yesterday, this has resolved.    We will anticipate discharge home tomorrow with home oxygen.    I have personally seen and examined the patient at bedside. I discussed the plan of care with patient and bedside RN.    Consultants/Specialty  None    Code Status  Full Code    Disposition  Patient is not medically cleared.   Anticipate discharge to to home with close outpatient follow-up.  I have placed the appropriate orders for post-discharge needs.    Review of Systems  Review of Systems   Constitutional: Positive for weight loss.   Respiratory: Positive for cough.    All  other systems reviewed and are negative.       Physical Exam  Temp:  [35.9 °C (96.6 °F)-36.3 °C (97.4 °F)] 36 °C (96.8 °F)  Pulse:  [60-81] 78  Resp:  [16] 16  BP: ()/(56-71) 92/56  SpO2:  [92 %-98 %] 94 %    Physical Exam  Vitals and nursing note reviewed.   Constitutional:       General: She is not in acute distress.     Appearance: Normal appearance.   HENT:      Head: Normocephalic and atraumatic.      Mouth/Throat:      Mouth: Mucous membranes are moist.      Pharynx: No oropharyngeal exudate.   Eyes:      Extraocular Movements: Extraocular movements intact.      Pupils: Pupils are equal, round, and reactive to light.   Cardiovascular:      Rate and Rhythm: Normal rate and regular rhythm.      Pulses: Normal pulses.      Heart sounds: No murmur heard.   No friction rub. No gallop.    Pulmonary:      Effort: Pulmonary effort is normal. No respiratory distress.      Breath sounds: No wheezing, rhonchi or rales.      Comments: Decreased breath sounds bilaterally  Abdominal:      General: Bowel sounds are normal. There is no distension.      Palpations: Abdomen is soft. There is no mass.      Tenderness: There is no abdominal tenderness.   Musculoskeletal:         General: No swelling or tenderness. Normal range of motion.      Cervical back: Normal range of motion. No rigidity. No muscular tenderness.      Right lower leg: No edema.      Left lower leg: No edema.   Skin:     General: Skin is warm and dry.      Capillary Refill: Capillary refill takes less than 2 seconds.      Findings: No erythema or rash.   Neurological:      General: No focal deficit present.      Mental Status: She is alert and oriented to person, place, and time.      Motor: No weakness.      Gait: Gait normal.         Fluids  No intake or output data in the 24 hours ending 08/15/21 1032    Laboratory  Recent Labs     08/13/21  1915 08/15/21  0151   WBC 5.1 5.2   RBC 3.98* 3.43*   HEMOGLOBIN 12.3 10.6*   HEMATOCRIT 37.3 32.6*   MCV 93.7  95.0   MCH 30.9 30.9   MCHC 33.0* 32.5*   RDW 46.1 46.4   PLATELETCT 191 164   MPV 10.6 10.6     Recent Labs     08/13/21  1915 08/14/21  0734 08/15/21  0151   SODIUM 135 134* 137   POTASSIUM 3.4* 4.4 4.3   CHLORIDE 100 103 106   CO2 22 22 21   GLUCOSE 109* 94 126*   BUN 21 21 25*   CREATININE 1.02 0.90 0.89   CALCIUM 8.8 8.4* 9.4                   Imaging  EC-ECHOCARDIOGRAM COMPLETE W/O CONT   Final Result      CT-CTA CHEST PULMONARY ARTERY W/ RECONS   Final Result         1.  No pulmonary embolus appreciated.   2.  Patchy reticular linear opacity in the right upper lobe, could represent early Covid infiltrates or other infiltrate. Appears to correspond with area concerning for nodule on chest x-ray.   3.  Changes of emphysema.   4.  Ascending thoracic aortic aneurysm, radiographic follow-up and surveillance recommended as clinically appropriate.   5.  Atherosclerosis and atherosclerotic coronary artery disease      DX-CHEST-PORTABLE (1 VIEW)   Final Result      1.  Nodular opacity in the peripheral right midlung measuring 1.4 cm. Further evaluation with CT chest is recommended.   2.  Stable cardiomegaly.   3.  Atherosclerotic plaque.      US-EXTREMITY VENOUS LOWER BILAT    (Results Pending)        Assessment/Plan  * Acute respiratory failure with hypoxia (HCC)  Assessment & Plan  Likely secondary to PNA and COPD  CTA negative for PE  Venous Doppler and ECHO pending  BNP was 226    Pneumonia  Assessment & Plan  Patient has been vaccinated against COVID.   Swab here is negative for Covid, influenza, RSV. Procal negative   CTA was ordered, negative for PE, RUL infiltrate noted, and a RML nodule measuring 1.4cm.      Abx: ceftriaxone +doxycyline  - SLP with no issues for diet  - RT     Pulmonary nodule  Assessment & Plan  CXR and CTA noted RML nodule measuring 1.4cm  Outpatient referral placed for pulmonology  Patient will need repeat imaging after pneumonia resolves    ACP (advance care planning)  Assessment &  Plan  Plan of care and rationale for hospitalization discussed with patient  Full CODE STATUS confirmed  ACP: 18 minutes    Moderate protein-calorie malnutrition (formerly [facility])  Assessment & Plan  Ensure    Hyperglycemia  Assessment & Plan  F/u HbA1c    Hypokalemia  Assessment & Plan  Replace  Empiric Mg replacement    COPD (chronic obstructive pulmonary disease) (formerly [facility])  Assessment & Plan  Not in acute exacerbation  Nebs treatment PRN, pulm toilet  Patient to follow-up with pulmonology for outpatient PFTs and to monitor pulmonary nodule    GERD (gastroesophageal reflux disease)- (present on admission)  Assessment & Plan  PPI    Former smoker- (present on admission)  Assessment & Plan  More than 40pack-year, quit smoking 2/2020    S/P AAA repair- (present on admission)  Assessment & Plan  Followed by cardiology    Stage 3 chronic kidney disease (formerly [facility])- (present on admission)  Assessment & Plan  Minimize nephrotoxic agents  Chronic, stable    Sleep apnea- (present on admission)  Assessment & Plan  Previously on CPAP - no longer use    Chronic insomnia- (present on admission)  Assessment & Plan  Continue with patient's trazodone as needed    Hypothyroid- (present on admission)  Assessment & Plan  Continue patient's Synthroid    TIA (transient ischemic attack)- (present on admission)  Assessment & Plan  History of, continue with statin therapy  Patient reports not taking aspirin due to upset stomach    Hypertension- (present on admission)  Assessment & Plan  Continue patient's losartan, Aldactone    Dyslipidemia- (present on admission)  Assessment & Plan  Continue with statin therapy    CAD (coronary artery disease)- (present on admission)  Assessment & Plan  Continue with statin therapy  Patient reports she does not take any aspirin due to upset stomach    Patient follows with cardiology, Dr. Pulido, had a stress test July 21 which was negative for any acute ischemia.       VTE prophylaxis: SCDs/TEDs and enoxaparin ppx    I  have performed a physical exam and reviewed and updated ROS and Plan today (8/15/2021). In review of yesterday's note (8/14/2021), there are no changes except as documented above.
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
## 2021-08-15 NOTE — CARE PLAN
1500 IS    Problem: Hyperinflation  Goal: Prevent or improve atelectasis  Description: 1. Instruct incentive spirometry usage  2.  Perform hyperinflation therapy as indicated  Outcome: Progressing

## 2021-08-15 NOTE — PROGRESS NOTES
Assumed care at 1915. Bedside report received from mane RN. Patient's chart and MAR reviewed. 12 hour chart check complete. Assessment complete, pt 0/10 pain at this time. Pt is awake in bed. Pt is A & O x 4. Patient was updated on plan of care for the day. Questions answered and concerns addressed.  Pt denies any additional needs at this time. White board updated. Call light, phone and personal belongings within reach. Bed alarm on and working appropriately. Vital signs stable.

## 2021-08-15 NOTE — CARE PLAN
The patient is Stable - Low risk of patient condition declining or worsening    Shift Goals  Clinical Goals: Rest, monitor O2 needs  Patient Goals: rest  Family Goals: NA    Progress made toward(s) clinical / shift goals:    Problem: Respiratory  Goal: Patient will achieve/maintain optimum respiratory ventilation and gas exchange  Outcome: Progressing     Problem: Risk for Aspiration  Goal: Patient's risk for aspiration will be absent or decrease  Outcome: Progressing     Problem: Hemodynamics - Pneumonia  Goal: Patient's hemodynamics, fluid balance and neurologic status will be stable or improve  Outcome: Progressing     Problem: Self Care  Goal: Patient will have the ability to perform ADLs independently or with assistance (bathe, groom, dress, toilet and feed)  Outcome: Met       Patient is not progressing towards the following goals:

## 2021-08-15 NOTE — DISCHARGE PLANNING
Agency/Facility Name: [facility]  Spoke To: Naomie  Outcome: Naomie stated she would review the referral    Agency/Facility Name: [facility]  Spoke To: Naomie  Outcome: Naomie stated pt is approved for services and that a on site tank can be used. Pt is to call [facility] prior to d/c to coordinate home o2 setup      @1257  This DPA requested that RT Aidvikram Lee brings a [facility] tank to pt

## 2021-08-16 VITALS
WEIGHT: 130.29 LBS | HEART RATE: 70 BPM | HEIGHT: 67 IN | SYSTOLIC BLOOD PRESSURE: 122 MMHG | RESPIRATION RATE: 16 BRPM | OXYGEN SATURATION: 92 % | DIASTOLIC BLOOD PRESSURE: 66 MMHG | BODY MASS INDEX: 20.45 KG/M2 | TEMPERATURE: 97 F

## 2021-08-16 LAB
BACTERIA UR CULT: NORMAL
ERYTHROCYTE [DISTWIDTH] IN BLOOD BY AUTOMATED COUNT: 46.5 FL (ref 35.9–50)
HCT VFR BLD AUTO: 33.9 % (ref 37–47)
HGB BLD-MCNC: 11.1 G/DL (ref 12–16)
MCH RBC QN AUTO: 31.2 PG (ref 27–33)
MCHC RBC AUTO-ENTMCNC: 32.7 G/DL (ref 33.6–35)
MCV RBC AUTO: 95.2 FL (ref 81.4–97.8)
PLATELET # BLD AUTO: 168 K/UL (ref 164–446)
PMV BLD AUTO: 10.7 FL (ref 9–12.9)
PROCALCITONIN SERPL-MCNC: 0.07 NG/ML
RBC # BLD AUTO: 3.56 M/UL (ref 4.2–5.4)
SIGNIFICANT IND 70042: NORMAL
SITE SITE: NORMAL
SOURCE SOURCE: NORMAL
WBC # BLD AUTO: 4.7 K/UL (ref 4.8–10.8)

## 2021-08-16 PROCEDURE — 99239 HOSP IP/OBS DSCHRG MGMT >30: CPT | Performed by: GENERAL PRACTICE

## 2021-08-16 PROCEDURE — 700111 HCHG RX REV CODE 636 W/ 250 OVERRIDE (IP): Performed by: GENERAL PRACTICE

## 2021-08-16 PROCEDURE — 700105 HCHG RX REV CODE 258: Performed by: STUDENT IN AN ORGANIZED HEALTH CARE EDUCATION/TRAINING PROGRAM

## 2021-08-16 PROCEDURE — A9270 NON-COVERED ITEM OR SERVICE: HCPCS | Performed by: GENERAL PRACTICE

## 2021-08-16 PROCEDURE — A9270 NON-COVERED ITEM OR SERVICE: HCPCS | Performed by: STUDENT IN AN ORGANIZED HEALTH CARE EDUCATION/TRAINING PROGRAM

## 2021-08-16 PROCEDURE — 84145 PROCALCITONIN (PCT): CPT

## 2021-08-16 PROCEDURE — 94664 DEMO&/EVAL PT USE INHALER: CPT

## 2021-08-16 PROCEDURE — 700102 HCHG RX REV CODE 250 W/ 637 OVERRIDE(OP): Performed by: GENERAL PRACTICE

## 2021-08-16 PROCEDURE — 700111 HCHG RX REV CODE 636 W/ 250 OVERRIDE (IP): Performed by: STUDENT IN AN ORGANIZED HEALTH CARE EDUCATION/TRAINING PROGRAM

## 2021-08-16 PROCEDURE — 700102 HCHG RX REV CODE 250 W/ 637 OVERRIDE(OP): Performed by: STUDENT IN AN ORGANIZED HEALTH CARE EDUCATION/TRAINING PROGRAM

## 2021-08-16 PROCEDURE — 36415 COLL VENOUS BLD VENIPUNCTURE: CPT

## 2021-08-16 PROCEDURE — 85027 COMPLETE CBC AUTOMATED: CPT

## 2021-08-16 RX ORDER — IPRATROPIUM BROMIDE AND ALBUTEROL SULFATE 2.5; .5 MG/3ML; MG/3ML
3 SOLUTION RESPIRATORY (INHALATION) EVERY 4 HOURS PRN
Qty: 30 EACH | Refills: 0 | Status: SHIPPED | OUTPATIENT
Start: 2021-08-16

## 2021-08-16 RX ORDER — LEVALBUTEROL TARTRATE 45 UG/1
1-2 AEROSOL, METERED ORAL EVERY 4 HOURS PRN
Qty: 15 G | Refills: 0 | Status: SHIPPED | OUTPATIENT
Start: 2021-08-16

## 2021-08-16 RX ADMIN — SPIRONOLACTONE 50 MG: 25 TABLET ORAL at 05:39

## 2021-08-16 RX ADMIN — ENOXAPARIN SODIUM 40 MG: 40 INJECTION SUBCUTANEOUS at 05:39

## 2021-08-16 RX ADMIN — LEVOTHYROXINE SODIUM 88 MCG: 0.09 TABLET ORAL at 05:39

## 2021-08-16 RX ADMIN — DOXYCYCLINE 100 MG: 100 TABLET, FILM COATED ORAL at 05:39

## 2021-08-16 RX ADMIN — CEFTRIAXONE SODIUM 1 G: 1 INJECTION, POWDER, FOR SOLUTION INTRAMUSCULAR; INTRAVENOUS at 05:40

## 2021-08-16 RX ADMIN — LOSARTAN POTASSIUM 100 MG: 50 TABLET, FILM COATED ORAL at 05:39

## 2021-08-16 NOTE — FACE TO FACE
Face to Face Note  -  Durable Medical Equipment    Urvashi Pinzon D.O. - NPI: [id number]  I certify that this patient is under my care and that they had a durable medical equipment(DME)face to face encounter by myself that meets the physician DME face-to-face encounter requirements with this patient on:    Date of encounter:   Patient:                    MRN:                       YOB: 2023  Evie Morillo  [id number]  1945     The encounter with the patient was in whole, or in part, for the following medical condition, which is the primary reason for durable medical equipment:  COPD    I certify that, based on my findings, the following durable medical equipment is medically necessary:  Nebulizer.    HOME O2 Saturation Measurements:(Values must be present for Home Oxygen orders)  Room air sat at rest: 95  Room air sat with amb: 87  With liters of O2: 2, O2 sat at rest with O2: 97  With Liters of O2: 2, O2 sat with amb with O2 : 91  Is the patient mobile?: Yes    My Clinical findings support the need for the above equipment due to:  Hypoxia    Supporting Symptoms: N/A    If patient feels more short of breath, they can go up to 6 liters per minute and contact healthcare provider.

## 2021-08-16 NOTE — DISCHARGE SUMMARY
"Discharge Summary    CHIEF COMPLAINT ON ADMISSION  Chief Complaint   Patient presents with   • Fever     \"102\" PTA  x 9 days   • Shortness of Breath     88% on RA   • Nausea/Vomiting/Diarrhea       Reason for Admission  SOB: Sent by MD     Admission Date  8/13/2021    CODE STATUS  Full Code    HPI & HOSPITAL COURSE  This is an 76 year old female with PMHx of hypertension, hyperlipidemia, CKD, hypothyroidism, CAD, ADRIANNA, history of TIA, , s/p AAA repair, history of tobacco use disorder (40 pack per year), thrombocytopenia and neutropenia s/p bone marrow biopsy with no evidence of hematologic disorder, positive BIRDIE (has referral to rheum, dx of lupus?) who presented to the ED on 8/13/2021, after being sent in by her PCP due to generalized weakness, poor p.o. intake, weight loss and nausea and vomiting.    Patient has been vaccinated against COVID. Swab here is negative for Covid, influenza, RSV.  CTA was ordered, negative for PE, RUL infiltrate noted, and a RML nodule measuring 1.4cm.  Patient will need to have a repeat imaging outpatient after pneumonia resolves, to monitor this nodule.  Referral for outpatient pulmonology placed, as patient has not had prior PFTs and to monitor this pulmonary nodule.    Patient's started on antibiotics for RUL PNA.  She will be discharged home with home oxygen and a prescription for antibiotics to complete a 7-day course.     Patient is to follow-up with a primary care physician, she has been provided a referral for pulmonology for repeat CT imaging in a few weeks to evaluate pulmonary nodule.    Therefore, she is discharged in good and stable condition to home with close outpatient follow-up.    The patient met 2-midnight criteria for an inpatient stay at the time of discharge.    Discharge Date  08/16/2021    FOLLOW UP ITEMS POST DISCHARGE  Primary care physician  Pulmonology    DISCHARGE DIAGNOSES  Principal Problem:    Acute respiratory failure with hypoxia (HCC) POA: " Unknown  Active Problems:    Pneumonia POA: Unknown    Pulmonary nodule POA: Unknown    CAD (coronary artery disease) (Chronic) POA: Yes      Overview: 2 stents [place] 2009      Dr. Pulido    Dyslipidemia (Chronic) POA: Yes    Hypertension (Chronic) POA: Yes    TIA (transient ischemic attack) (Chronic) POA: Yes      Overview: 2001 x 2, and 2003 in [place].     Hypothyroid (Chronic) POA: Yes    Chronic insomnia (Chronic) POA: Yes    Sleep apnea (Chronic) POA: Yes      Overview: Not on CPAP    Stage 3 chronic kidney disease (HCC) POA: Yes    S/P AAA repair (Chronic) POA: Yes    Former smoker POA: Yes    GERD (gastroesophageal reflux disease) (Chronic) POA: Yes    COPD (chronic obstructive pulmonary disease) (HCC) POA: Unknown    Hypokalemia POA: Unknown    Hyperglycemia POA: Unknown    Moderate protein-calorie malnutrition (HCC) POA: Unknown    ACP (advance care planning) POA: Unknown  Resolved Problems:    * No resolved hospital problems. *      FOLLOW UP  Future Appointments   Date Time Provider Department Center   9/3/2021  9:15 AM [facility] EXAM 12 ECHO [facility]   2/9/2022  9:10 AM Matilde Barksdale M.D. [facility] None     [facility]  [address] [id number] [phone]    In 2 weeks      [address] [address]  [phone]    For delivery of your home oxygen concentrator.      MEDICATIONS ON DISCHARGE     Medication List      START taking these medications      Instructions   ipratropium-albuterol 0.5-2.5 (3) MG/3ML nebulizer solution  Commonly known as: DUONEB   Take 3 mL by nebulization every four hours as needed for Shortness of Breath.  Dose: 3 mL     levalbuterol 45 MCG/ACT inhaler  Commonly known as: XOPENEX HFA   Inhale 1-2 Puffs every four hours as needed for Shortness of Breath.  Dose: 1-2 Puff     levoFLOXacin 500 MG tablet  Start taking on: August 17, 2021  Commonly known as: LEVAQUIN   Take 1 Tablet by mouth  every day for 4 days.  Dose: 500 mg        CONTINUE taking these medications      Instructions   atorvastatin 80 MG tablet  Commonly known as: LIPITOR   Take 1 Tab by mouth every day.  Dose: 80 mg     DAILY MULTIVITAMIN PO   Take 1 Tablet by mouth every morning.  Dose: 1 Tablet     levothyroxine 88 MCG Tabs  Commonly known as: SYNTHROID   Take 1 tablet by mouth every morning on an empty stomach.  Dose: 88 mcg     losartan 100 MG Tabs  Commonly known as: COZAAR   Take 0.5 Tablets by mouth every day.  Dose: 50 mg     spironolactone 50 MG Tabs  Commonly known as: ALDACTONE   Take 50 mg by mouth every morning.  Dose: 50 mg     traZODone 150 MG Tabs  Commonly known as: DESYREL   Take 150 mg by mouth at bedtime.  Dose: 150 mg            Allergies  Allergies   Allergen Reactions   • Pcn [Penicillins] Shortness of Breath, Rash and Swelling      Tolerated ceftriaxone 8/15/21.       DIET  Orders Placed This Encounter   Procedures   • Diet Order Diet: Cardiac     Standing Status:   Standing     Number of Occurrences:   1     Order Specific Question:   Diet:     Answer:   Cardiac [6]       ACTIVITY  As tolerated.  Weight bearing as tolerated    CONSULTATIONS  None    PROCEDURES  None    LABORATORY  Lab Results   Component Value Date    SODIUM 137 08/15/2021    POTASSIUM 4.3 08/15/2021    CHLORIDE 106 08/15/2021    CO2 21 08/15/2021    GLUCOSE 126 (H) 08/15/2021    BUN 25 (H) 08/15/2021    CREATININE 0.89 08/15/2021        Lab Results   Component Value Date    WBC 4.7 (L) 08/16/2021    HEMOGLOBIN 11.1 (L) 08/16/2021    HEMATOCRIT 33.9 (L) 08/16/2021    PLATELETCT 168 08/16/2021      US-EXTREMITY VENOUS LOWER BILAT   Final Result      EC-ECHOCARDIOGRAM COMPLETE W/O CONT   Final Result      CT-CTA CHEST PULMONARY ARTERY W/ RECONS   Final Result         1.  No pulmonary embolus appreciated.   2.  Patchy reticular linear opacity in the right upper lobe, could represent early Covid infiltrates or other infiltrate. Appears to  correspond with area concerning for nodule on chest x-ray.   3.  Changes of emphysema.   4.  Ascending thoracic aortic aneurysm, radiographic follow-up and surveillance recommended as clinically appropriate.   5.  Atherosclerosis and atherosclerotic coronary artery disease      DX-CHEST-PORTABLE (1 VIEW)   Final Result      1.  Nodular opacity in the peripheral right midlung measuring 1.4 cm. Further evaluation with CT chest is recommended.   2.  Stable cardiomegaly.   3.  Atherosclerotic plaque.          Total time of the discharge process exceeds 45 minutes.

## 2021-08-16 NOTE — DISCHARGE INSTRUCTIONS
Discharge Instructions    Discharged to home by car with relative. Discharged via wheelchair, hospital escort: Yes.  Special equipment needed: Not Applicable    Be sure to schedule a follow-up appointment with your primary care doctor or any specialists as instructed.     Discharge Plan:        I understand that a diet low in cholesterol, fat, and sodium is recommended for good health. Unless I have been given specific instructions below for another diet, I accept this instruction as my diet prescription.   Other diet: Regular      Special Instructions: None    · Is patient discharged on Warfarin / Coumadin?   No     Depression / Suicide Risk    As you are discharged from this [facility] facility, it is important to learn how to keep safe from harming yourself.    Recognize the warning signs:  · Abrupt changes in personality, positive or negative- including increase in energy   · Giving away possessions  · Change in eating patterns- significant weight changes-  positive or negative  · Change in sleeping patterns- unable to sleep or sleeping all the time   · Unwillingness or inability to communicate  · Depression  · Unusual sadness, discouragement and loneliness  · Talk of wanting to die  · Neglect of personal appearance   · Rebelliousness- reckless behavior  · Withdrawal from people/activities they love  · Confusion- inability to concentrate     If you or a loved one observes any of these behaviors or has concerns about self-harm, here's what you can do:  · Talk about it- your feelings and reasons for harming yourself  · Remove any means that you might use to hurt yourself (examples: pills, rope, extension cords, firearm)  · Get professional help from the community (Mental Health, Substance Abuse, psychological counseling)  · Do not be alone:Call your Safe Contact- someone whom you trust who will be there for you.  · Call your local CRISIS HOTLINE 303-8558 or 502-910-0914  · Call your local Children's Mobile  Crisis Response Team [place] (257) 135-2300 or www.Nextt  · Call the toll free National Suicide Prevention Hotlines   · National Suicide Prevention Lifeline 818-887-HTVS (6745)  · National Hope Line Network 800-SUICIDE (645-6611)    Levofloxacin oral solution  What is this medicine?  LEVOFLOXACIN(robert rupinder FLOX a sin) is a quinolone antibiotic. It is used to treat certain kinds of bacterial infections. It will not work for colds, flu, or other viral infections.  This medicine may be used for other purposes; ask your health care provider or pharmacist if you have questions.  COMMON BRAND NAME(S): Levaquin  What should I tell my health care provider before I take this medicine?  They need to know if you have any of these conditions:  · bone problems  · diabetes  · heart disease  · high blood pressure  · history of irregular heartbeat  · history of low levels of potassium in the blood  · joint problems  · kidney disease  · liver disease  · mental illness  · myasthenia gravis  · seizures  · tendon problems  · tingling of the fingers or toes, or other nerve disorder  · an unusual or allergic reaction to levofloxacin, other quinolone antibiotics, foods, dyes, or preservatives  · pregnant or trying to get pregnant  · breast-feeding  How should I use this medicine?  Take this medicine by mouth. Follow the directions on the prescription label. Use a specially marked spoon or dropper to measure each dose. Ask your pharmacist if you do not have one. Household spoons are not accurate. Take this medicine on an empty stomach, at least 1 hour before or 2 hours after food. Do not take with food. Take your medicine at regular intervals. Do not take your medicine more often than directed. Take all of your medicine as directed even if you think you are better. Do not skip doses or stop your medicine early.  Avoid antacids, calcium, iron, and zinc products for 2 hours before and 2 hours after taking a dose of this  medicine.  A special MedGuide will be given to you by the pharmacist with each prescription and refill. Be sure to read this information carefully each time.  Talk to your pediatrician regarding the use of this medicine in children. While this drug may be prescribed for children as young as 6 months for selected conditions, precautions do apply.  Overdosage: If you think you have taken too much of this medicine contact a poison control center or emergency room at once.  NOTE: This medicine is only for you. Do not share this medicine with others.  What if I miss a dose?  If you miss a dose, take it as soon as you can. If it is almost time for your next dose, take only that dose. Do not take double or extra doses.  What may interact with this medicine?  Do not take this medicine with any of the following medications:  · cisapride  · dronedarone  · pimozide  · thioridazine  This medicine may also interact with the following medications:  · antacids  · birth control pills  · certain medicines for diabetes, like glipizide, glyburide, or insulin  · certain medicines that treat or prevent blood clots like warfarin  · didanosine buffered tablets or powder  · multivitamins  · NSAIDS, medicines for pain and inflammation, like ibuprofen or naproxen  · other medicines that prolong the QT interval (cause an abnormal heart rhythm) like dofetilide, ziprasidone  · steroid medicines like prednisone or cortisone  · sucralfate  · theophylline  This list may not describe all possible interactions. Give your health care provider a list of all the medicines, herbs, non-prescription drugs, or dietary supplements you use. Also tell them if you smoke, drink alcohol, or use illegal drugs. Some items may interact with your medicine.  What should I watch for while using this medicine?  Tell your doctor or healthcare professional if your symptoms do not start to get better or if they get worse.  Do not treat diarrhea with over the counter  products. Contact your doctor if you have diarrhea that lasts more than 2 days or if it is severe and watery.  Check with your doctor or health care professional if you get an attack of severe diarrhea, nausea and vomiting, or if you sweat a lot. The loss of too much body fluid can make it dangerous for you to take this medicine.  This medicine may increase blood sugar. Ask your healthcare provider if changes in diet or medicines are needed if you have diabetes.  You may get drowsy or dizzy. Do not drive, use machinery, or do anything that needs mental alertness until you know how this medicine affects you. Do not sit or stand up quickly, especially if you are an older patient. This reduces the risk of dizzy or fainting spells.  This medicine can make you more sensitive to the sun. Keep out of the sun. If you cannot avoid being in the sun, wear protective clothing and use sunscreen. Do not use sun lamps or tanning beds/booths.  What side effects may I notice from receiving this medicine?  Side effects that you should report to your doctor or health care professional as soon as possible:  · allergic reactions like skin rash or hives, swelling of the face, lips, or tongue  · anxious  · bloody or watery diarrhea  · breathing problems  · confusion  · depressed mood  · fast, irregular heartbeat  · fever  · hallucination, loss of contact with reality  · joint, muscle, or tendon pain or swelling  · loss of memory  · muscle weakness  · pain, tingling, numbness in the hands or feet  · seizures  · signs and symptoms of aortic dissection such as sudden chest, stomach, or back pain  · signs and symptoms of high blood sugar such as being more thirsty or hungry or having to urinate more than normal. You may also feel very tired or have blurry vision.  · signs and symptoms of liver injury like dark yellow or brown urine; general ill feeling or flu-like symptoms; light-colored stools; loss of appetite; nausea; right upper belly  pain; unusually weak or tired; yellowing of the eyes or skin  · signs and symptoms of low blood sugar such as feeling anxious; confusion; dizziness; increased hunger; unusually weak or tired; sweating; shakiness; cold; irritable; headache; blurred vision; fast heartbeat; loss of consciousness; pale skin  · suicidal thoughts or other mood changes  · sunburn  · unusually weak  Side effects that usually do not require medical attention (report to your doctor or health care professional if they continue or are bothersome):  · constipation  · dry mouth  · headache  · nausea, vomiting  · trouble sleeping  This list may not describe all possible side effects. Call your doctor for medical advice about side effects. You may report side effects to FDA at 8-563-CUI-6886.  Where should I keep my medicine?  Keep out of the reach of children.  Store at room temperature of 15 to 30 degrees C (59 to 86 degrees F). Throw away any unused medicine after the expiration date.  NOTE: This sheet is a summary. It may not cover all possible information. If you have questions about this medicine, talk to your doctor, pharmacist, or health care provider.  © 2020 [employer]/Gold Standard (2019-12-09 14:02:28)          Please continue taking all your medications as you are with the addition of:  Levofloxacin 500 mg daily for the next 4 days -these are antibiotics for your pneumonia  I have provided you with an albuterol inhaler, use this when you feel acutely short of breath    Please follow-up with your primary care physician within 2 weeks.  Please follow-up with a pulmonologist, a lung doctor, your primary care can help you choose a lung doctor, to monitor this pulmonary nodule and also to have pulmonary function test performed given your significant smoking history.    Please take care and be well!

## 2021-08-16 NOTE — RESPIRATORY CARE
"  COPD EDUCATION by COPD CLINICAL EDUCATOR  (Phone: [phone])  8/16/2021 at 10:12 AM by Sadie Dodson RRT    Patient seen by Respiratory Education team to complete the final block of education.  This session discussed signs and symptoms of an exacerbation (flare-up), triggers that can create flare-ups, reiteration of the \"Action Plan\" to refer to daily which will help categorize their symptoms in order to utilize the appropriate therapy, breathing techniques used to treat acute symptoms, and oxygen safety was discussed as appropriate to this patient.  Patient will be getting a home Nebulizer and Spacer given.  Question and answer session followed.    COPD Screen  COPD Risk Screening  Do you have a history of COPD?: No  COPD Population Screener  During the past 4 weeks, how much did you feel short of breath?: Some of the time  Do you ever cough up any mucus or phlegm?: Yes, every day  In the past 12 months, you do less than you used to because of your breathing problems: Agree  Have you smoked at least 100 cigarettes in your entire life?: Yes  How old are you?: 60+  COPD Screening Score: 8  COPD Coordinator Recommended: Yes    COPD Assessment  COPD Clinical Specialists ONLY  COPD Education Initiated: Yes--Full Intervention  COPD Education Session 1: Yes (Given COPD booklet and reviewed what COPD is and how it can affect the body)  COPD Education Session 2: Yes (completed today, but has not been dx COPD but presents as COPD and  PNA) has follow up with Pulmonary  DME Company: unknown  DME Equipment Type: CPAP (owned?)  Physician Name: Cindy Busby,  Pulmonary Follow Up Appointment: 02/09/22 (Referral placed by MD, apt requested)  Appt Time: 0910  Pulmonologist Name: Matilde Barksdale M.D.  Palliative Care:  (declined)  Referrals Initiated: Yes  Pulmonary Rehab: Yes (Pt states she will need to discuss further with spouse)  Smoking Cessation: N/A (Pt quit smoking 17 months ago, had some weight gain, but is " "now loosing weight)  Hospice: N/A  Home Health Care: N/A  [facility] Out[facility]: Yes (referral sent)  [facility]: N/A  [facility]: Declined (Pt worried about cost)  Private In-Home Care Agency: N/A  Is this a COPD exacerbation patient?: No  $ Demo/Eval of SVN's, MDI's and Aerosols: Yes (Pt discharging with Xopenex Inhaler less side effects)  (OP) Pulmonary Function Testing:  (Pt has appt with Pulmonary for dx)    Meds to Beds  Would the patient like to opt in for Bedside Medication Delivery at Discharge?: Yes, interested (Pt possible discharge today interested in meds to bed)     MY COPD ACTION PLAN     It is recommended that patients and physicians /healthcare providers complete this action plan together. This plan should be discussed at each physician visit and updated as needed.    The green, yellow and red zones show groups of symptoms of COPD. This list of symptoms is not comprehensive, and you may experience other symptoms. In the \"Actions\" column, your healthcare provider has recommended actions for you to take based on your symptoms.    Patient Name: Evie Morillo   YOB: 1945   Last Updated on: 8/16/2021 10:40 AM   Green Zone:  I am doing well today Actions   •  Usual activitiy and exercise level •  Take daily medications   •  Usual amounts of cough and phlegm/mucus •  Use oxygen as prescribed   •  Sleep well at night •  Continue regular exercise/diet plan   •  Appetite is good •  At all times avoid cigarette smoke, inhaled irritants     Daily Medications (these medications are taken every day):   Levalbuterol (Xopenex)  Albuterol/Ipratropium (Combivent, Duoneb) 2 Puffs  3mL via nebulizer Every 4 hours PRN  Every 4 hours PRN     Additional Information:  Use Nebulizer as needed, before activity while at home'    Use Rescue inhaler when not at home and do not have nebulizer available    Patient instructed on importance of cleaning home nebulizer after every treatment " "to prevent infection.    Yellow Zone:  I am having a bad day or a COPD flare Actions   •  More breathless than usual •  Continue daily medications   •  I have less energy for my daily activities •  Use quick relief inhaler as ordered   •  Increased or thicker phlegm/mucus •  Use oxygen as prescribed   •  Using quick relief inhaler/nebulizer more often •  Get plenty of rest   •  Swelling of ankles more than usual •  Use pursed lip breathing   •  More coughing than usual •  At all times avoid cigarette smoke, inhaled irritants   •  I feel like I have a \"chest cold\"   •  Poor sleep and my symptoms woke me up   •  My appetite is not good   •  My medicine is not helping    •  Call provider immediately if symptoms don’t improve     Continue daily medications, add rescue medications:   Albuterol/Ipratropium (Combivent, Duoneb) 3mL via nebulizer Every 4 hours       Medications to be used during a flare up, (as Discussed with Provider):           Additional Information:  If symptoms do not improve call provider immediately    Red Zone:  I need urgent medical care Actions   •  Severe shortness of breath even at rest •  Call 911 or seek medical care immediately   •  Not able to do any activity because of breathing    •  Fever or shaking chills    •  Feeling confused or very drowsy     •  Chest pains    •  Coughing up blood              "

## 2021-08-16 NOTE — PROGRESS NOTES
Patient being discharged. Patient educated on discharge instructions and new prescriptions. Patient verbalized understanding. Follow up appt made with PCP PIV removed, telemetry monitor checked in. Patient going home with . RN to call transport to escort out. Will monitor until patient is off unit.

## 2021-08-16 NOTE — PROGRESS NOTES
Assumed care of patient at bedside report from DAY RN. Updated on POC. Patient currently A & O x 4; on 2 L O2 silicone nasal cannula; up self; without complaints of acute pain. Call light within reach. Whiteboard updated. Fall precautions in place. Bed locked and in lowest position. All questions answered. No other needs indicated at this time.

## 2021-08-16 NOTE — DISCHARGE PLANNING
Agency/Facility Name: [facility]  Spoke To: Aliza   Outcome: Updated Home o2 order is in process, DPA requested that if home setup will not be completed today to contact DPA.

## 2021-08-16 NOTE — CARE PLAN
The patient is Stable - Low risk of patient condition declining or worsening    Shift Goals  Clinical Goals: Rest  Patient Goals: rest  Family Goals: NA    Progress made toward(s) clinical / shift goals:  Pt achieved rest throughout shift.    Patient is not progressing towards the following goals:NA    Problem: Care Map:  Optimal Outcome for the Pneumonia Patient  Goal: Collection and monitoring of appropriate tests and labs  Outcome: Progressing     Problem: Respiratory  Goal: Patient will achieve/maintain optimum respiratory ventilation and gas exchange  Outcome: Progressing     Problem: Risk for Aspiration  Goal: Patient's risk for aspiration will be absent or decrease  Outcome: Progressing     Problem: Hemodynamics - Pneumonia  Goal: Patient's hemodynamics, fluid balance and neurologic status will be stable or improve  Outcome: Progressing     Problem: Discharge Planning - Pneumonia  Goal: Patient will verbalize understanding of lifestyle changes and therapeutic needs post discharge  Outcome: Progressing     Problem: Knowledge Deficit - Standard  Goal: Patient and family/care givers will demonstrate understanding of plan of care, disease process/condition, diagnostic tests and medications  Outcome: Progressing     Problem: Knowledge Deficit - COPD  Goal: Patient/significant other demonstrates understanding of disease process, utilization of the Action Plan, medications and discharge instruction  Outcome: Progressing     Problem: Risk for Infection - COPD  Goal: Patient will remain free from signs and symptoms of infection  Outcome: Progressing     Problem: Nutrition - Advanced  Goal: Patient will display progressive weight gain toward goal have adequate food and fluid intake  Outcome: Progressing     Problem: Ineffective Airway Clearance  Goal: Patient will maintain patent airway with clear/clearing breath sounds  Outcome: Progressing     Problem: Impaired Gas Exchange  Goal: Patient will demonstrate improved  ventilation and adequate oxygenation and participate in treatment regimen within the level of ability/situation.  Outcome: Progressing     Problem: Hemodynamics  Goal: Patient's hemodynamics, fluid balance and neurologic status will be stable or improve  Outcome: Progressing     Problem: Fluid Volume  Goal: Fluid volume balance will be maintained  Outcome: Progressing     Problem: Urinary - Renal Perfusion  Goal: Ability to achieve and maintain adequate renal perfusion and functioning will improve  Outcome: Progressing     Problem: Physical Regulation  Goal: Diagnostic test results will improve  Outcome: Progressing  Goal: Signs and symptoms of infection will decrease  Outcome: Progressing

## 2021-08-17 ENCOUNTER — PATIENT OUTREACH (OUTPATIENT)
Dept: MEDICAL GROUP | Facility: MEDICAL CENTER | Age: 76
End: 2021-08-17

## 2021-08-17 LAB — M PNEUMO IGM SER IA-ACNC: 0.06 U/L

## 2021-08-17 NOTE — PROGRESS NOTES
RN Transitional Care Management discharge follow up call completed. Patient had emesis this am about an hour after levofloxacin dose; per Micromedex, may be taken with or without food, encouraged patient to try next dose with a snack to see if it helps with n/v. Patient reports challenges with landlord repairing dryer, encouraged patient to reach out to [employer] as needed for landlord/tenant issues. [facility] RN assisted in scheduling discharge follow up appointment scheduled with provider in PCP clinic on 8/30/21. Provided CCM RN contact number for any additional questions/concerns. Please route chart back to RN if additional follow up is needed/requested.     Thank you!    IVANA Schroeder Care Coordinator  Community Care Management [phone]  Chronic Care Management [phone]

## 2021-08-18 LAB
BACTERIA BLD CULT: NORMAL
BACTERIA BLD CULT: NORMAL
SIGNIFICANT IND 70042: NORMAL
SIGNIFICANT IND 70042: NORMAL
SITE SITE: NORMAL
SITE SITE: NORMAL
SOURCE SOURCE: NORMAL
SOURCE SOURCE: NORMAL

## 2021-08-30 ENCOUNTER — OFFICE VISIT (OUTPATIENT)
Dept: MEDICAL GROUP | Facility: MEDICAL CENTER | Age: 76
End: 2021-08-30
Payer: MEDICARE

## 2021-08-30 VITALS
OXYGEN SATURATION: 97 % | HEIGHT: 66 IN | TEMPERATURE: 95.9 F | RESPIRATION RATE: 16 BRPM | DIASTOLIC BLOOD PRESSURE: 74 MMHG | WEIGHT: 135 LBS | HEART RATE: 93 BPM | SYSTOLIC BLOOD PRESSURE: 118 MMHG | BODY MASS INDEX: 21.69 KG/M2

## 2021-08-30 DIAGNOSIS — J44.9 CHRONIC OBSTRUCTIVE PULMONARY DISEASE, UNSPECIFIED COPD TYPE (HCC): ICD-10-CM

## 2021-08-30 DIAGNOSIS — R91.1 PULMONARY NODULE: ICD-10-CM

## 2021-08-30 DIAGNOSIS — J96.01 ACUTE RESPIRATORY FAILURE WITH HYPOXIA (HCC): ICD-10-CM

## 2021-08-30 DIAGNOSIS — J18.9 PNEUMONIA OF RIGHT UPPER LOBE DUE TO INFECTIOUS ORGANISM: ICD-10-CM

## 2021-08-30 DIAGNOSIS — F32.A DEPRESSION, UNSPECIFIED DEPRESSION TYPE: ICD-10-CM

## 2021-08-30 PROCEDURE — 99495 TRANSJ CARE MGMT MOD F2F 14D: CPT | Performed by: STUDENT IN AN ORGANIZED HEALTH CARE EDUCATION/TRAINING PROGRAM

## 2021-08-30 RX ORDER — ESCITALOPRAM OXALATE 20 MG/1
20 TABLET ORAL DAILY
Qty: 30 TABLET | Refills: 11 | Status: SHIPPED | OUTPATIENT
Start: 2021-08-30 | End: 2022-09-07

## 2021-08-30 ASSESSMENT — FIBROSIS 4 INDEX: FIB4 SCORE: 5.99

## 2021-08-30 NOTE — PROGRESS NOTES
Subjective:     Evie Morillo is a 76 y.o. female who presents for Hospital Follow-up.    POST DISCHARGE CALL:  Discharge Date:8/16/2021   Date of Outreach Call: 8/17/2021 10:35 AM  Now that you're home, how are you doing? Fair  Do you have questions about your medications? Yes  Comment:had emesis after antibx dose this am, per  Micromedex, ok to to take with or without food, encouraged  patient to try to take with small snack to help keep dose  down  Did you fill your medications? Yes  Do you have a follow-up appointment scheduled?No  Discharging Department: Telemetry 8  Number of Attempts: 1  Current or previous attempts completed within two business days of discharge? Yes  Provided education regarding treatment plan, medication, self-management, ADLs? Yes  Has patient completed Advance Directive? If yes, advise them to bring to appointment. Yes  Care Manager phone number provided? Yes  Is there anything else I can help you with? No    HPI:   Recently hospitalized for  Pneumonia  Patient presented to the ED 8/13 due to concerns of weakness, poor p.o. intake, weight loss, nausea and vomiting.  Testing was negative for Covid, influenza, RSV.  CTA was ordered and negative for PE but right upper lung infiltrate was noted as well as a right medial lobe nodule measuring 1.4 cm.  Patient referred to pulmonary for further evaluation of nodule and PFTs.  Patient has been took antibiotics as prescribed.  Patient discharged with home oxygen.  Patient recommended to get repeat CT once pneumonia has improved.    Patient continues today on home oxygen.  Patient on 2 L.  Patient states that she has been busy with chores since she arrived home and has taken little time to rest.  Patient states that she feels better but is frustrated by her slow recovery.  Patient denies any fever or chills.  Patient states that nausea and vomiting has improved.  Patient states improved p.o. intake.  Patient denies shortness of breath.   "Patient denies chest pain.    Depression  Patient presents today due to concern about anxiety/depression.  Patient states that she was on the medication for several years and it was very helpful.  On review of records patient was on escitalopram and stopped taking this in December 2020.  Patient states that she stopped taking it due to feeling better but today expresses concerns about extreme irritability.  Patient states that she is frequently angry with her  and even her dogs.  Patient states that when she was on the medication she was always singing and laughing.  Patient does continue to take trazodone for sleep.    ROS:  Gen: no fevers/chills, no changes in weight  Pulm: no sob, no cough  CV: no chest pain, no palpitations  GI: no nausea/vomiting, no diarrhea  : no dysuria  MSk: no myalgias  Skin: no rash  Neuro: no headaches, no numbness/tingling    Objective:     Exam:  /74 (BP Location: Right arm, Patient Position: Sitting, BP Cuff Size: Adult)   Pulse 93   Temp (!) 35.5 °C (95.9 °F) (Temporal)   Resp 16   Ht 1.676 m (5' 6\")   Wt 61.2 kg (135 lb)   LMP  (LMP Unknown)   SpO2 97%   BMI 21.79 kg/m²  Body mass index is 21.79 kg/m².    Gen: Alert and oriented, No apparent distress.  Neck: Neck is supple without lymphadenopathy.  Lungs: Normal effort, on 2 L nasal cannula, + wheezes bilaterally, no rhonchi, or rales  CV: Regular rate and rhythm. No murmurs, rubs, or gallops.  Ext: No clubbing, cyanosis, edema.    Assessment and Plan:   1. Depression, unspecified depression type  Chronic, stable.  Patient on escitalopram for several years.  Patient will be restarted on EScitalopram. Discussed in depth with patient the pro's and con's of antidepressant therapy. I explained that it may take 2-4 weeks for the medication to take effect. Side effects discussed. Some people can have increased depression on these medications. If you should develope any homicidal or suicidal thoughts, you need to go " to the emergency room immediatly for evaluation and possible admission. Otherwise I would like to see you back in six weeks to evaluate treatment success.  - escitalopram (LEXAPRO) 20 MG tablet; Take 1 Tablet by mouth every day.  Dispense: 30 Tablet; Refill: 11    2. Pulmonary nodule  Chronic, stable.  We will follow-up on pulmonary nodule in the next few weeks with repeat CT as recommended by ER.  Patient provided number for follow-up with pulmonary today.- CT-CHEST (THORAX) W/O; Future    3. Acute respiratory failure with hypoxia (HCC)  4. Chronic obstructive pulmonary disease, unspecified COPD type (HCC)  5. Pneumonia of right upper lobe due to infectious organism  Patient continues on 2 L oxygen secondary to pneumonia and COPD.  Patient states that she is improving from pneumonia.  Patient continues on oxygen.  Patient will follow up with pulmonary for further evaluation.  Patient provided phone number today.    - Chart and discharge summary were reviewed.   - Hospitalization and results reviewed with patient.   - Medications reviewed including instructions regarding high risk medications, dosing and side effects.  - Recommended Services: No services needed at this time  - Advance directive/POLST on file?  Yes    Follow-up:Return if symptoms worsen or fail to improve.

## 2021-09-03 ENCOUNTER — HOSPITAL ENCOUNTER (OUTPATIENT)
Dept: CARDIOLOGY | Facility: MEDICAL CENTER | Age: 76
End: 2021-09-03
Attending: INTERNAL MEDICINE
Payer: MEDICARE

## 2021-09-03 DIAGNOSIS — R07.89 OTHER CHEST PAIN: ICD-10-CM

## 2021-09-03 DIAGNOSIS — I71.20 THORACIC AORTIC ANEURYSM WITHOUT RUPTURE (HCC): ICD-10-CM

## 2021-09-03 DIAGNOSIS — I35.1 NONRHEUMATIC AORTIC VALVE INSUFFICIENCY: ICD-10-CM

## 2021-09-15 ENCOUNTER — HOSPITAL ENCOUNTER (OUTPATIENT)
Dept: RADIOLOGY | Facility: MEDICAL CENTER | Age: 76
End: 2021-09-15
Attending: STUDENT IN AN ORGANIZED HEALTH CARE EDUCATION/TRAINING PROGRAM
Payer: MEDICARE

## 2021-09-15 DIAGNOSIS — R91.1 PULMONARY NODULE: ICD-10-CM

## 2021-09-15 PROCEDURE — 71250 CT THORAX DX C-: CPT | Mod: ME

## 2021-09-20 DIAGNOSIS — R91.8 ABNORMAL CT SCAN OF LUNG: ICD-10-CM

## 2021-09-20 DIAGNOSIS — R91.8 LUNG MASS: ICD-10-CM

## 2021-10-01 ENCOUNTER — OFFICE VISIT (OUTPATIENT)
Dept: MEDICAL GROUP | Facility: MEDICAL CENTER | Age: 76
End: 2021-10-01
Payer: MEDICARE

## 2021-10-01 VITALS
SYSTOLIC BLOOD PRESSURE: 116 MMHG | TEMPERATURE: 97.3 F | OXYGEN SATURATION: 99 % | DIASTOLIC BLOOD PRESSURE: 76 MMHG | BODY MASS INDEX: 22.25 KG/M2 | HEART RATE: 65 BPM | HEIGHT: 66 IN | WEIGHT: 138.45 LBS

## 2021-10-01 DIAGNOSIS — J96.01 ACUTE RESPIRATORY FAILURE WITH HYPOXIA (HCC): ICD-10-CM

## 2021-10-01 DIAGNOSIS — R91.8 LUNG MASS: ICD-10-CM

## 2021-10-01 DIAGNOSIS — Z23 NEED FOR VACCINATION: ICD-10-CM

## 2021-10-01 DIAGNOSIS — R93.89 ABNORMAL CT SCAN: ICD-10-CM

## 2021-10-01 PROBLEM — D69.6 THROMBOCYTOPENIA (HCC): Chronic | Status: ACTIVE | Noted: 2020-01-24

## 2021-10-01 PROCEDURE — G0008 ADMIN INFLUENZA VIRUS VAC: HCPCS | Performed by: NURSE PRACTITIONER

## 2021-10-01 PROCEDURE — 90662 IIV NO PRSV INCREASED AG IM: CPT | Performed by: NURSE PRACTITIONER

## 2021-10-01 PROCEDURE — 99214 OFFICE O/P EST MOD 30 MIN: CPT | Mod: 25 | Performed by: NURSE PRACTITIONER

## 2021-10-01 ASSESSMENT — FIBROSIS 4 INDEX: FIB4 SCORE: 5.99

## 2021-10-01 NOTE — PROGRESS NOTES
Chief Complaint   Patient presents with   • Other     CT scan follow up       Subjective:     HPI:     Evie Morillo is a 76 y.o. female here to discuss the evaluation and management of:    Patient presents today to review her most recent CT scan.    Patient was recently discharged from the hospital with pneumonia.  She was sent home with supplemental oxygen.  Patient states that she is feeling better since her discharge.  She is currently using 2 L of oxygen.  Denies any shortness of breath.  States that she is walking around and occasionally takes her oxygen off.  States that when she does check it at home she is running 89 to 93% without oxygen.  She would like to at some point wean off of the oxygen.  Recommend weaning down to 1 L at now and continue to check her oxygen at home.  At this time suspect she will continue to need to oxygen with ambulation/activity.    CT of her chest was ordered at her last office visit to follow-up on a nodule that was noted on a CTA. Most recent CT scan of her chest shows the abnormal mass in the right upper lobe concerning for malignancy. Patient does mention that she used to work with [employer] when she was in her 40s.    1.  No significant change in appearance and size of irregular peripheral masslike opacity in the right upper lobe anteriorly measuring approximately 3.2 x 1.7 cm. This could be related to malignancy and/or infection. Consider further evaluation with   PET/CT and/or tissue sampling.  2.  Additional peripheral opacities within the lungs have improved with mild residual nonspecific reticular and groundglass. Attention to this on follow-up.  3.  Emphysematous changes.  4.  Severe atherosclerosis. Mild aneurysm of the ascending thoracic aorta measuring 4.5 cm.    ROS:  Denies any Headache, Blurred Vision, Confusion, Chest pain,  Shortness of breath,  Abdominal pain, Changes of bowel or bladder, Lower ext edema, Fevers, Nights sweats, Weight Changes, Focal weakness  "or numbness.  And all other systems reviewed and are all negative. POSITIVE FOR : see above        Current Outpatient Medications:   •  escitalopram (LEXAPRO) 20 MG tablet, Take 1 Tablet by mouth every day., Disp: 30 Tablet, Rfl: 11  •  levalbuterol (XOPENEX HFA) 45 MCG/ACT inhaler, Inhale 1-2 Puffs every four hours as needed for Shortness of Breath., Disp: 15 g, Rfl: 0  •  ipratropium-albuterol (DUONEB) 0.5-2.5 (3) MG/3ML nebulizer solution, Take 3 mL by nebulization every four hours as needed for Shortness of Breath., Disp: 30 Each, Rfl: 0  •  spironolactone (ALDACTONE) 50 MG Tab, Take 50 mg by mouth every morning., Disp: , Rfl:   •  Multiple Vitamins-Minerals (DAILY MULTIVITAMIN PO), Take 1 Tablet by mouth every morning., Disp: , Rfl:   •  losartan (COZAAR) 100 MG Tab, Take 0.5 Tablets by mouth every day., Disp: 90 tablet, Rfl: 1  •  levothyroxine (SYNTHROID) 88 MCG Tab, Take 1 tablet by mouth every morning on an empty stomach., Disp: 90 tablet, Rfl: 3  •  atorvastatin (LIPITOR) 80 MG tablet, TAKE 1 TABLET BY MOUTH EVERY DAY, Disp: 90 Tablet, Rfl: 3  •  traZODone (DESYREL) 150 MG Tab, TAKE 1 TABLET BY MOUTH TWICE DAILY, Disp: 180 Tablet, Rfl: 3    Allergies   Allergen Reactions   • Pcn [Penicillins] Shortness of Breath, Rash and Swelling      Tolerated ceftriaxone 8/15/21.   • Other Environmental Runny Nose     Dogs, cats       Past Medical History:   Diagnosis Date   • AAA (abdominal aortic aneurysm) ([facility])     had surgery for this.   • Arthritis     fingers   • Bladder infection 10/15/2021    antibiotic course done   • Bowel habit changes     diarrhea   • Bronchitis    • CAD (coronary artery disease) 10/4/2012    2 stents [place] 2009    • Cataract     right eye   • Chronic insomnia 5/23/2016   • COPD (chronic obstructive pulmonary disease) ([facility])     \"I think\"   • Dental disorder     upper denture   • Depression 9/26/2013    not a current issue-not on medications   • Diverticula of colon    • Environmental and " seasonal allergies    • High cholesterol    • Hyperlipidemia 10/4/2012   • Hypertension    • Hypothyroid 10/17/2013   • Lung nodule     Right   • Lupus (HCC)    • Nonrheumatic aortic valve insufficiency     Dr. Pulido   • Oxygen dependent     1 L O2 NC   • Pneumonia    • Postural hypotension 10/7/2014     IMO load 2020   • Sleep apnea     (+) ADRIANNA , not using CPAP   • TIA (transient ischemic attack) 10/04/2012    2001 (x2) and then one in    • Tobacco abuse 2016     Past Surgical History:   Procedure Laterality Date   • NE DX BONE MARROW ASPIRATIONS Bilateral 2021    Procedure: ASPIRATION, BONE MARROW - VEGA;  Surgeon: Jin Conrad M.D.;  Location: ENDOSCOPY [facility];  Service: Orthopedics   • NE DX BONE MARROW BIOPSIES Bilateral 2021    Procedure: BIOPSY, BONE MARROW, USING NEEDLE OR TROCAR;  Surgeon: Jin Conrad M.D.;  Location: ENDOSCOPY [facility];  Service: Orthopedics   • AAA WITH STENT GRAFT  2020   • STENT PLACEMENT  2008    x 2 Dr Can in [place]   • OTHER Left     plantar wart removed on left foot   • OTHER      throat polyps removed (non cancerous) > 20 years ago     Family History   Problem Relation Age of Onset   • Heart Disease Mother    • Heart Attack Mother    • Heart Disease Father    • Heart Attack Father    • Cancer Maternal Grandmother         stomach   • Diabetes Maternal Grandmother    • Cancer Maternal Grandfather         brain   • Stroke Paternal Grandmother    • Cancer Other      Social History     Socioeconomic History   • Marital status:      Spouse name: Not on file   • Number of children: Not on file   • Years of education: Not on file   • Highest education level: Not on file   Occupational History   • Not on file   Tobacco Use   • Smoking status: Former Smoker     Packs/day: 0.50     Years: 40.00     Pack years: 20.00     Types: Cigarettes     Quit date: 2020     Years since quittin.7   • Smokeless tobacco: Never Used  "  Vaping Use   • Vaping Use: Never used   Substance and Sexual Activity   • Alcohol use: Not Currently   • Drug use: Not Currently   • Sexual activity: Not Currently     Partners: Male   Other Topics Concern   • Not on file   Social History Narrative    On disability      Social Determinants of Health     Financial Resource Strain:    • Difficulty of Paying Living Expenses: Not on file   Food Insecurity:    • Worried About Running Out of Food in the Last Year: Not on file   • Ran Out of Food in the Last Year: Not on file   Transportation Needs:    • Lack of Transportation (Medical): Not on file   • Lack of Transportation (Non-Medical): Not on file   Physical Activity:    • Days of Exercise per Week: Not on file   • Minutes of Exercise per Session: Not on file   Stress:    • Feeling of Stress : Not on file   Social Connections:    • Frequency of Communication with Friends and Family: Not on file   • Frequency of Social Gatherings with Friends and Family: Not on file   • Attends Alevism Services: Not on file   • Active Member of Clubs or Organizations: Not on file   • Attends Club or Organization Meetings: Not on file   • Marital Status: Not on file   Intimate Partner Violence:    • Fear of Current or Ex-Partner: Not on file   • Emotionally Abused: Not on file   • Physically Abused: Not on file   • Sexually Abused: Not on file   Housing Stability:    • Unable to Pay for Housing in the Last Year: Not on file   • Number of Places Lived in the Last Year: Not on file   • Unstable Housing in the Last Year: Not on file       Objective:     Vitals: /76   Pulse 65   Temp 36.3 °C (97.3 °F) (Temporal)   Ht 1.676 m (5' 6\")   Wt 62.8 kg (138 lb 7.2 oz)   LMP  (LMP Unknown)   SpO2 99%   BMI 22.35 kg/m²    General: Alert, pleasant, NAD  HEENT: Normocephalic.  Neck supple.   Respiratory: no distress, no audible wheezing, RR -WNL wearing oxygen by nasal cannula.  Skin: Warm, dry, no rashes.  Extremities: No leg edema. " No discoloration  Neurological: No tremors  Psych:  Affect/mood is normal, judgement is good, memory is intact, grooming is appropriate.    Assessment/Plan:     Evie was seen today for other.    Diagnoses and all orders for this visit:    Abnormal CT scan  Lung mass  New finding on most recent CT scan.  Highly suspicious for malignancy.  Will place referral to [facility] for further evaluation recommendation.    Acute respiratory failure with hypoxia (HCC)  Stable.  Saturations are improving.  Will wean down to 1 L at this time and have asked patient to continue to check her oxygen saturations at home.  Follow-up in 1 month.    Need for vaccination  -     Influenza Vaccine, High Dose (65+ Only)        Return in about 4 weeks (around 10/29/2021).    {I have placed the above orders and discussed them with an approved delegating provider.  The MA is performing the below orders under the direction of Dr. Justus LAZO

## 2021-10-06 ENCOUNTER — OFFICE VISIT (OUTPATIENT)
Dept: HEMATOLOGY ONCOLOGY | Facility: MEDICAL CENTER | Age: 76
End: 2021-10-06
Payer: MEDICARE

## 2021-10-06 VITALS
HEART RATE: 66 BPM | RESPIRATION RATE: 17 BRPM | BODY MASS INDEX: 20.92 KG/M2 | WEIGHT: 130.18 LBS | DIASTOLIC BLOOD PRESSURE: 60 MMHG | TEMPERATURE: 98.8 F | SYSTOLIC BLOOD PRESSURE: 108 MMHG | HEIGHT: 66 IN | OXYGEN SATURATION: 97 %

## 2021-10-06 DIAGNOSIS — R91.8 LUNG MASS: ICD-10-CM

## 2021-10-06 PROCEDURE — 99214 OFFICE O/P EST MOD 30 MIN: CPT | Performed by: NURSE PRACTITIONER

## 2021-10-06 ASSESSMENT — ENCOUNTER SYMPTOMS
CHILLS: 0
SHORTNESS OF BREATH: 0
SORE THROAT: 0
WHEEZING: 0
DIZZINESS: 0
DIAPHORESIS: 0
INSOMNIA: 0
HEADACHES: 0
WEIGHT LOSS: 0
CONSTIPATION: 0
DIARRHEA: 0
NAUSEA: 0
COUGH: 0
MYALGIAS: 0
PALPITATIONS: 0
VOMITING: 0
FEVER: 0

## 2021-10-06 ASSESSMENT — PAIN SCALES - GENERAL: PAINLEVEL: NO PAIN

## 2021-10-06 ASSESSMENT — FIBROSIS 4 INDEX: FIB4 SCORE: 5.99

## 2021-10-06 NOTE — Clinical Note
Cat - She had a pretty bad symptoms from the flu shot last week- just FYI. Also, not certain if you know that she used to be on Plavix but she stopped it to have a tooth pulled and never restarted it.  She then tried aspirin but did not like how it made her feel so she stopped that as well.  She is not on any medication but this was previously prescribed by her cardiologist.  At last visit when I saw her in July for hematology as well as today I did highly encouraged that she follow-up with cardiology for further discussion.  Obviously, I do not want her on any blood thinners before the lung biopsy but I feel that this is something that should be addressed.  Wanted to make sure you were aware.  Kyung

## 2021-10-06 NOTE — PROGRESS NOTES
Subjective     Ibeth Morillo is a 76 y.o. female who presents as a New Patient (IC New/lung Mass)            HPI    Patient referred to me, Intake Oncology Coordinator by her PCP KENNEDI Urias for Lung mass.  Patient is unaccompanied for today's visit.    Patient was recently seen in the emergency department in August 2021 for shortness of breath, fever, nausea/vomiting/diarrhea.  She was admitted for a few days and had further work-up.  Ultimately was informed that she had pneumonia and treated with antibiotics.  At discharge she was discharged home with home oxygen.  During her hospital visit on 8/13/2021 she had a CTA completed to rule out pulmonary emboli.  There was no evidence of PE however there was a patchy reticular linear opacity in the right upper lobe.  Reading radiologist stated this can likely represent early Covid infiltrate or other type of infiltrate.  She was tested for Covid during hospitalization and found to be negative.  Post hospital follow-up on 9/15/2021 patient had a repeat CT chest through primary care office for reevaluation.  There was no significant change in the appearance and size of the irregular peripheral masslike opacity in the right upper lobe anteriorly.  This mass measures 3.2 x 1.7 cm in size, differentials include malignancy versus infection.  There is also some additional peripheral opacities within the lungs which have improved with some mild residual nonspecific reticular and groundglass appearance.  She also has some emphysema as well as severe atherosclerosis, mild aneurysm of the ascending thoracic aorta.  I personally reviewed the imaging report images in detail, and I reviewed both of them with the patient today.    Clinically patient feels well.  She was recently seen by her PCP on 10/1/2021 and did receive the high-dose flu vaccine which she did have significant side effects from.  She stated she  had experienced severe chills, fever, nausea, vomiting,  diarrhea x3 days.  She recently started to feel better last night and today feels well.  She denies any fevers or chills at this time, she denies any fatigue or significant weight loss.  She did lose some weight with her reaction to the flu vaccine but she has been doing well with great appetite just prior to that episode.  She does note a runny nose which she states Nasocort works well.  She denies coughing, wheezing shortness of breath.  She would like to have her oxygen weaned off as she is doing very well.  She is very active in her home and around the yard.    Please see past medical and surgical history below.  Patient is known to this office as a hematology patient.  She was last seen by myself for hematology follow-up visit in July 2021 for neutropenia and macrocytosis.  Patient underwent bone marrow biopsy which was negative for hematologic issue.  She had full work-up for her neutropenia/leukopenia and found a positive BIRDIE.  She has been referred to rheumatology back in July 2021 but is yet to establish care.    Patient is a previous smoker, quit in February 2020.  She smoked for approximately 40 years on average of 1/2 pack/day.  Patient stated that from [id number] she was exposed to silica when she worked in [place] on the [employer].    Patient does have a family history of cancer in a maternal grandmother with stomach cancer, maternal grandfather with brain cancer, and she stated she has had multiple cousins with cancer, most recent one had lung cancer.    Allergies   Allergen Reactions   • Pcn [Penicillins] Shortness of Breath, Rash and Swelling      Tolerated ceftriaxone 8/15/21.     Current Outpatient Medications on File Prior to Visit   Medication Sig Dispense Refill   • escitalopram (LEXAPRO) 20 MG tablet Take 1 Tablet by mouth every day. 30 Tablet 11   • levalbuterol (XOPENEX HFA) 45 MCG/ACT inhaler Inhale 1-2 Puffs every four hours as needed for Shortness of Breath. 15 g 0   •  ipratropium-albuterol (DUONEB) 0.5-2.5 (3) MG/3ML nebulizer solution Take 3 mL by nebulization every four hours as needed for Shortness of Breath. 30 Each 0   • spironolactone (ALDACTONE) 50 MG Tab Take 50 mg by mouth every morning.     • traZODone (DESYREL) 150 MG Tab Take 150 mg by mouth at bedtime.     • Multiple Vitamins-Minerals (DAILY MULTIVITAMIN PO) Take 1 Tablet by mouth every morning.     • losartan (COZAAR) 100 MG Tab Take 0.5 Tablets by mouth every day. 90 tablet 1   • levothyroxine (SYNTHROID) 88 MCG Tab Take 1 tablet by mouth every morning on an empty stomach. 90 tablet 3   • atorvastatin (LIPITOR) 80 MG tablet Take 1 Tab by mouth every day. 90 Tab 3     No current facility-administered medications on file prior to visit.     Past Medical History:   Diagnosis Date   • AAA (abdominal aortic aneurysm) ([facility])     had surgery for this.   • Arthritis     fingers   • CAD (coronary artery disease) 10/4/2012    2 stents [place] 2009    • Chronic insomnia 5/23/2016   • Dental disorder     upper denture   • Depression 9/26/2013    not a current issue-not on medications   • Diverticula of colon    • Environmental and seasonal allergies    • High cholesterol    • Hyperlipidemia 10/4/2012   • Hypertension    • Hypothyroid 10/17/2013   • Lupus (HCC)    • Nonrheumatic aortic valve insufficiency     Dr. Pulido   • Postural hypotension 10/7/2014     IMO load March 2020   • Sleep apnea     (+) ADRIANNA , not using CPAP   • TIA (transient ischemic attack) 10/04/2012    2001 (x2) and then one in 2003   • Tobacco abuse 4/20/2016     Past Surgical History:   Procedure Laterality Date   • DC DX BONE MARROW ASPIRATIONS Bilateral 7/14/2021    Procedure: ASPIRATION, BONE MARROW - VEGA;  Surgeon: Jin Conrad M.D.;  Location: [facility];  Service: Orthopedics   • DC DX BONE MARROW BIOPSIES Bilateral 7/14/2021    Procedure: BIOPSY, BONE MARROW, USING NEEDLE OR TROCAR;  Surgeon: Jin Conrad M.D.;  Location:  ENDOSCOPY [facility];  Service: Orthopedics   • AAA WITH STENT GRAFT  2020   • STENT PLACEMENT  2008    x 2 Dr Can in [place]   • OTHER Left     plantar wart removed on left foot   • OTHER      throat polyps removed (non cancerous) > 20 years ago       Family History   Problem Relation Age of Onset   • Heart Disease Mother    • Heart Attack Mother    • Heart Disease Father    • Heart Attack Father    • Cancer Maternal Grandmother         stomach   • Diabetes Maternal Grandmother    • Cancer Maternal Grandfather         brain   • Stroke Paternal Grandmother    • Cancer Other      Social History     Socioeconomic History   • Marital status:      Spouse name: Not on file   • Number of children: Not on file   • Years of education: Not on file   • Highest education level: Not on file   Occupational History   • Not on file   Tobacco Use   • Smoking status: Former Smoker     Packs/day: 0.50     Years: 40.00     Pack years: 20.00     Types: Cigarettes     Quit date: 2020     Years since quittin.6   • Smokeless tobacco: Never Used   Vaping Use   • Vaping Use: Never used   Substance and Sexual Activity   • Alcohol use: Not Currently   • Drug use: Not Currently   • Sexual activity: Not Currently     Partners: Male   Other Topics Concern   • Not on file   Social History Narrative    On disability      Social Determinants of Health     Financial Resource Strain:    • Difficulty of Paying Living Expenses:    Food Insecurity:    • Worried About Running Out of Food in the Last Year:    • Ran Out of Food in the Last Year:    Transportation Needs:    • Lack of Transportation (Medical):    • Lack of Transportation (Non-Medical):    Physical Activity:    • Days of Exercise per Week:    • Minutes of Exercise per Session:    Stress:    • Feeling of Stress :    Social Connections:    • Frequency of Communication with Friends and Family:    • Frequency of Social Gatherings with Friends and Family:    • Attends  "Holiness Services:    • Active Member of Clubs or Organizations:    • Attends Club or Organization Meetings:    • Marital Status:    Intimate Partner Violence:    • Fear of Current or Ex-Partner:    • Emotionally Abused:    • Physically Abused:    • Sexually Abused:          Review of Systems   Constitutional: Negative for chills, diaphoresis, fever, malaise/fatigue and weight loss.   HENT: Negative for congestion (runny nose - resolves with Nasocort) and sore throat.    Respiratory: Negative for cough, shortness of breath and wheezing.    Cardiovascular: Negative for chest pain and palpitations.   Gastrointestinal: Negative for constipation, diarrhea, nausea and vomiting.   Genitourinary: Negative for dysuria.   Musculoskeletal: Negative for myalgias.   Skin: Positive for itching (r/t synthroid). Negative for rash.   Neurological: Negative for dizziness and headaches.   Psychiatric/Behavioral: The patient does not have insomnia.               Objective     /60   Pulse 66   Temp 37.1 °C (98.8 °F) (Temporal)   Resp 17   Ht 1.664 m (5' 5.5\")   Wt 59.1 kg (130 lb 2.9 oz)   LMP  (LMP Unknown)   SpO2 97%   BMI 21.33 kg/m²      Physical Exam  Vitals reviewed.   Constitutional:       General: She is not in acute distress.     Appearance: Normal appearance. She is not diaphoretic.   HENT:      Head: Normocephalic and atraumatic.   Eyes:      General: No scleral icterus.        Right eye: No discharge.         Left eye: No discharge.      Extraocular Movements: Extraocular movements intact.      Conjunctiva/sclera: Conjunctivae normal.      Pupils: Pupils are equal, round, and reactive to light.   Cardiovascular:      Rate and Rhythm: Normal rate and regular rhythm.      Pulses: Normal pulses.      Heart sounds: Normal heart sounds. No murmur heard.   No friction rub. No gallop.    Pulmonary:      Effort: Pulmonary effort is normal. No respiratory distress.      Breath sounds: Normal breath sounds. No " wheezing.      Comments: On O2 since last hospital visit  Abdominal:      General: Bowel sounds are normal. There is no distension.      Palpations: Abdomen is soft.      Tenderness: There is no abdominal tenderness.   Musculoskeletal:         General: No swelling or tenderness.   Lymphadenopathy:      Head:      Right side of head: No submental, submandibular, tonsillar, preauricular, posterior auricular or occipital adenopathy.      Left side of head: No submental, submandibular, tonsillar, preauricular, posterior auricular or occipital adenopathy.      Cervical: No cervical adenopathy.      Right cervical: No superficial, deep or posterior cervical adenopathy.     Left cervical: No superficial, deep or posterior cervical adenopathy.      Upper Body:      Right upper body: No supraclavicular adenopathy.      Left upper body: No supraclavicular adenopathy.   Skin:     General: Skin is warm and dry.   Neurological:      Mental Status: She is alert and oriented to person, place, and time.   Psychiatric:         Mood and Affect: Mood normal.         Behavior: Behavior normal.               CT-CHEST (THORAX) W/O    Result Date: 9/15/2021  9/15/2021 10:56 AM HISTORY/REASON FOR EXAM:  Lung nodule, > 8mm. Shortness of breath. Lung nodule TECHNIQUE/EXAM DESCRIPTION: CT scan of the chest without contrast. Noncontrast helical scanning of the chest was obtained from the lung apices through the adrenal glands. Low dose optimization technique was utilized for this CT exam including automated exposure control and adjustment of the mA and/or kV according to patient size. COMPARISON: 8/14/2021 FINDINGS: Lungs: Centrilobular emphysematous changes. Ill-defined peripheral spiculated masslike opacity in the right upper lobe is not significantly changed in size or appearance measuring about 3.2 x 1.7 cm. It is indeterminate but raises concern for malignancy given the appearance and lack of interval change/improvement. Infection/post  inflammatory changes cannot be entirely excluded. Consider further evaluation with PET/CT. Additional peripheral opacity seen on prior study have somewhat improved with mild central subpleural reticular and groundglass.. Mediastinum/Adelia: No significant adenopathy. Pleura: No pleural effusion. Cardiac: Heart normal in size without pericardial effusion. Vascular: Severe atherosclerosis. Soft tissues: Unremarkable. Bones: No acute or destructive process. Subtle nodularity of the liver margin and heterogeneity of the liver could be changes of chronic liver disease/cirrhosis. Partially visualized abdominal aortic stent graft.     1.  No significant change in appearance and size of irregular peripheral masslike opacity in the right upper lobe anteriorly measuring approximately 3.2 x 1.7 cm. This could be related to malignancy and/or infection. Consider further evaluation with PET/CT and/or tissue sampling. 2.  Additional peripheral opacities within the lungs have improved with mild residual nonspecific reticular and groundglass. Attention to this on follow-up. 3.  Emphysematous changes. 4.  Severe atherosclerosis. Mild aneurysm of the ascending thoracic aorta measuring 4.5 cm. Fleischner Society pulmonary nodule recommendations: Not Applicable            Assessment & Plan        1. Lung mass  CT-BIOPSY-LUNG/MEDIASTINUM W/GUIDE RIGHT       Patient with a 3.2 x 1.7 cm irregular peripheral masslike opacity in the right upper lobe anteriorly.  Discussed with patient recommendation to proceed with CT-guided lung biopsy.  Question whether PET/CT would be warranted, however would be difficult to differentiate between infectious etiology versus malignancy on a PET/CT.  Therefore as the mass has essentially remained stable, but slight increase in size as original CT was 2.8 cm in maximum dimension and now 3.2 cm in maximal dimension it is warranted to proceed with CT-guided biopsy.  Discussed with patient recommendation at this  time and she did verbalize understanding and is in agreement with the plan.    Patient is no longer on a blood thinner.  According to patient as mentioned in previous note during her hematology visit she has a cardiologist, Dr. Pulido.  According to patient she was previously on aspirin but prior to that she was on Plavix.  She was discontinued off of Plavix to have a tooth extraction but never restarted it.  She attempted aspirin but stated it made her stomach upset, therefore she is not on any blood thinner medication.  I have highly encouraged patient again to follow-up with her cardiologist.    Patient to follow-up with me in the clinic to review the results of lung biopsy once completed.        Please note that this dictation was created using voice recognition software. I have made every reasonable attempt to correct obvious errors, but I expect that there are errors of grammar and possibly content that I did not discover before finalizing the note.

## 2021-10-12 ENCOUNTER — TELEPHONE (OUTPATIENT)
Dept: HEMATOLOGY ONCOLOGY | Facility: MEDICAL CENTER | Age: 76
End: 2021-10-12

## 2021-10-12 NOTE — TELEPHONE ENCOUNTER
Called pt LVM asking her to call back to schedule her post biopsy appt edmundo/Kyung.    Clinic Staff: If pt calls back please try and schedule her on Nov 8th for a follow up visit, or as close to that as possible. Thank you

## 2021-10-15 ENCOUNTER — OFFICE VISIT (OUTPATIENT)
Dept: URGENT CARE | Facility: PHYSICIAN GROUP | Age: 76
End: 2021-10-15
Payer: MEDICARE

## 2021-10-15 ENCOUNTER — HOSPITAL ENCOUNTER (OUTPATIENT)
Facility: MEDICAL CENTER | Age: 76
End: 2021-10-15
Attending: NURSE PRACTITIONER
Payer: MEDICARE

## 2021-10-15 VITALS
HEART RATE: 75 BPM | RESPIRATION RATE: 16 BRPM | BODY MASS INDEX: 20.89 KG/M2 | WEIGHT: 130 LBS | SYSTOLIC BLOOD PRESSURE: 100 MMHG | DIASTOLIC BLOOD PRESSURE: 62 MMHG | OXYGEN SATURATION: 94 % | HEIGHT: 66 IN | TEMPERATURE: 98.4 F

## 2021-10-15 DIAGNOSIS — R82.90 ABNORMAL URINE ODOR: ICD-10-CM

## 2021-10-15 DIAGNOSIS — N30.01 ACUTE CYSTITIS WITH HEMATURIA: ICD-10-CM

## 2021-10-15 DIAGNOSIS — R35.0 URINARY FREQUENCY: ICD-10-CM

## 2021-10-15 DIAGNOSIS — N18.31 STAGE 3A CHRONIC KIDNEY DISEASE: ICD-10-CM

## 2021-10-15 DIAGNOSIS — R30.0 DYSURIA: ICD-10-CM

## 2021-10-15 LAB
APPEARANCE UR: NORMAL
BILIRUB UR STRIP-MCNC: NEGATIVE MG/DL
COLOR UR AUTO: NORMAL
GLUCOSE UR STRIP.AUTO-MCNC: NEGATIVE MG/DL
KETONES UR STRIP.AUTO-MCNC: NEGATIVE MG/DL
LEUKOCYTE ESTERASE UR QL STRIP.AUTO: NORMAL
NITRITE UR QL STRIP.AUTO: NEGATIVE
PH UR STRIP.AUTO: 6 [PH] (ref 5–8)
PROT UR QL STRIP: 30 MG/DL
RBC UR QL AUTO: NORMAL
SP GR UR STRIP.AUTO: 1.02
UROBILINOGEN UR STRIP-MCNC: 0.2 MG/DL

## 2021-10-15 PROCEDURE — 87186 SC STD MICRODIL/AGAR DIL: CPT

## 2021-10-15 PROCEDURE — 87077 CULTURE AEROBIC IDENTIFY: CPT

## 2021-10-15 PROCEDURE — 87086 URINE CULTURE/COLONY COUNT: CPT

## 2021-10-15 PROCEDURE — 99214 OFFICE O/P EST MOD 30 MIN: CPT | Performed by: NURSE PRACTITIONER

## 2021-10-15 PROCEDURE — 81002 URINALYSIS NONAUTO W/O SCOPE: CPT | Performed by: NURSE PRACTITIONER

## 2021-10-15 RX ORDER — SULFAMETHOXAZOLE AND TRIMETHOPRIM 800; 160 MG/1; MG/1
1 TABLET ORAL EVERY 12 HOURS
Qty: 6 TABLET | Refills: 0 | Status: SHIPPED | OUTPATIENT
Start: 2021-10-15 | End: 2021-10-18

## 2021-10-15 ASSESSMENT — ENCOUNTER SYMPTOMS
NEUROLOGICAL NEGATIVE: 1
MUSCULOSKELETAL NEGATIVE: 1
CARDIOVASCULAR NEGATIVE: 1
RESPIRATORY NEGATIVE: 1
CONSTITUTIONAL NEGATIVE: 1
GASTROINTESTINAL NEGATIVE: 1
EYES NEGATIVE: 1
PSYCHIATRIC NEGATIVE: 1

## 2021-10-15 ASSESSMENT — FIBROSIS 4 INDEX: FIB4 SCORE: 5.99

## 2021-10-15 NOTE — PROGRESS NOTES
Subjective:   Evie Morillo is a 76 y.o. female who presents for UTI (odor, frequency )       Dysuria   This is a new problem. The current episode started in the past 7 days. The problem occurs every urination. The problem has been waxing and waning. The quality of the pain is described as aching and burning. The pain is mild. Associated symptoms include frequency, hesitancy and urgency. She has tried increased fluids for the symptoms. The treatment provided mild relief.     Review of Systems   Constitutional: Negative.    HENT: Negative.    Eyes: Negative.    Respiratory: Negative.    Cardiovascular: Negative.    Gastrointestinal: Negative.    Genitourinary: Positive for dysuria, frequency, hesitancy and urgency.   Musculoskeletal: Negative.    Skin: Negative.    Neurological: Negative.    Psychiatric/Behavioral: Negative.    All other systems reviewed and are negative.      MEDS:   Current Outpatient Medications:   •  escitalopram (LEXAPRO) 20 MG tablet, Take 1 Tablet by mouth every day., Disp: 30 Tablet, Rfl: 11  •  levalbuterol (XOPENEX HFA) 45 MCG/ACT inhaler, Inhale 1-2 Puffs every four hours as needed for Shortness of Breath., Disp: 15 g, Rfl: 0  •  ipratropium-albuterol (DUONEB) 0.5-2.5 (3) MG/3ML nebulizer solution, Take 3 mL by nebulization every four hours as needed for Shortness of Breath., Disp: 30 Each, Rfl: 0  •  spironolactone (ALDACTONE) 50 MG Tab, Take 50 mg by mouth every morning., Disp: , Rfl:   •  traZODone (DESYREL) 150 MG Tab, Take 150 mg by mouth at bedtime., Disp: , Rfl:   •  Multiple Vitamins-Minerals (DAILY MULTIVITAMIN PO), Take 1 Tablet by mouth every morning., Disp: , Rfl:   •  losartan (COZAAR) 100 MG Tab, Take 0.5 Tablets by mouth every day., Disp: 90 tablet, Rfl: 1  •  levothyroxine (SYNTHROID) 88 MCG Tab, Take 1 tablet by mouth every morning on an empty stomach., Disp: 90 tablet, Rfl: 3  •  atorvastatin (LIPITOR) 80 MG tablet, Take 1 Tab by mouth every day., Disp: 90 Tab, Rfl:  "3  ALLERGIES:   Allergies   Allergen Reactions   • Pcn [Penicillins] Shortness of Breath, Rash and Swelling      Tolerated ceftriaxone 8/15/21.       Patient's PMH, SocHx, SurgHx, FamHx, Drug allergies and medications were reviewed.     Objective:   /62 (BP Location: Right arm, Patient Position: Sitting, BP Cuff Size: Adult)   Pulse 75   Temp 36.9 °C (98.4 °F) (Temporal)   Resp 16   Ht 1.664 m (5' 5.5\")   Wt 59 kg (130 lb)   LMP  (LMP Unknown)   SpO2 94%   BMI 21.30 kg/m²     Physical Exam  Vitals and nursing note reviewed.   Constitutional:       General: She is awake.      Appearance: Normal appearance. She is well-developed.   HENT:      Head: Normocephalic and atraumatic.      Right Ear: External ear normal.      Left Ear: External ear normal.      Nose: Nose normal.      Mouth/Throat:      Mouth: Mucous membranes are moist.      Pharynx: Oropharynx is clear.   Eyes:      Extraocular Movements: Extraocular movements intact.      Conjunctiva/sclera: Conjunctivae normal.   Cardiovascular:      Rate and Rhythm: Normal rate and regular rhythm.      Heart sounds: Normal heart sounds.   Pulmonary:      Effort: Pulmonary effort is normal.      Breath sounds: Normal breath sounds.   Abdominal:      General: Bowel sounds are normal.      Palpations: Abdomen is soft.      Tenderness: There is abdominal tenderness in the suprapubic area. There is no right CVA tenderness or left CVA tenderness.   Musculoskeletal:         General: Normal range of motion.      Cervical back: Normal range of motion and neck supple.   Skin:     General: Skin is warm and dry.   Neurological:      General: No focal deficit present.      Mental Status: She is alert and oriented to person, place, and time.   Psychiatric:         Mood and Affect: Mood normal.         Behavior: Behavior normal. Behavior is cooperative.         Thought Content: Thought content normal.         Judgment: Judgment normal.         Assessment/Plan: "   Assessment    1. Acute cystitis with hematuria  - POCT Urinalysis  - Urine Culture; Future  - sulfamethoxazole-trimethoprim (BACTRIM DS) 800-160 MG tablet; Take 1 Tablet by mouth every 12 hours for 3 days.  Dispense: 6 Tablet; Refill: 0    2. Dysuria  - POCT Urinalysis  - Urine Culture; Future  - sulfamethoxazole-trimethoprim (BACTRIM DS) 800-160 MG tablet; Take 1 Tablet by mouth every 12 hours for 3 days.  Dispense: 6 Tablet; Refill: 0    3. Urinary frequency  - POCT Urinalysis  - Urine Culture; Future  - sulfamethoxazole-trimethoprim (BACTRIM DS) 800-160 MG tablet; Take 1 Tablet by mouth every 12 hours for 3 days.  Dispense: 6 Tablet; Refill: 0    4. Abnormal urine odor  - POCT Urinalysis  - Urine Culture; Future  - sulfamethoxazole-trimethoprim (BACTRIM DS) 800-160 MG tablet; Take 1 Tablet by mouth every 12 hours for 3 days.  Dispense: 6 Tablet; Refill: 0    5. Stage 3a chronic kidney disease (HCC)  - eGFR on 8/13/21 was >60    Vital signs stable at today's acute urgent care visit.  Reviewed test results that were completed in the clinic.  Send urine for culture.  Begin antibiotics as listed.  Recommend begin cranberry tablets as well as pushing fluids. Discussed management options (risks, benefits, and alternatives to treatment).     Advised the patient to follow-up with the primary care provider for recheck, reevaluation, and/or consideration of further management if necessary. Return to urgent care with any worsening symptoms or if there is no improvement in their current condition. Red flags discussed and indications to immediately call 911 or present to the Emergency Department.  All questions were encouraged and answered to the patient's satisfaction and understanding, and they agree to the plan of care.     I personally reviewed prior external notes and test results pertinent to today's visit.  I have independently reviewed and interpreted all diagnostics ordered during this urgent care acute visit. Time  spent evaluating this patient was a minimum of 30 minutes and includes preparing for visit, counseling/education, exam and evaluation, obtaining history, independent interpretation and ordering lab/test/procedures.      Please note that this dictation was created using voice recognition software. I have made a reasonable attempt to correct obvious errors, but I expect that there are errors of grammar and possibly content that I did not discover before finalizing the note.

## 2021-10-18 LAB
BACTERIA UR CULT: ABNORMAL
BACTERIA UR CULT: ABNORMAL
SIGNIFICANT IND 70042: ABNORMAL
SITE SITE: ABNORMAL
SOURCE SOURCE: ABNORMAL

## 2021-10-19 ENCOUNTER — PRE-ADMISSION TESTING (OUTPATIENT)
Dept: ADMISSIONS | Facility: MEDICAL CENTER | Age: 76
End: 2021-10-19
Attending: INTERNAL MEDICINE
Payer: MEDICARE

## 2021-10-25 ENCOUNTER — TELEPHONE (OUTPATIENT)
Dept: HEMATOLOGY ONCOLOGY | Facility: MEDICAL CENTER | Age: 76
End: 2021-10-25

## 2021-10-25 NOTE — TELEPHONE ENCOUNTER
Angelic called back and stated we can get pt on for 11/18, that is two weeks from the 4th @11:00am will call pt to let her know and gerri a [facility] after with [place].    Pt aware and agreed with plan explained to her she will have to call Pre-admit again before appt.She agreed and stated thank you!

## 2021-10-25 NOTE — TELEPHONE ENCOUNTER
Pt called and stated that she has a cold, and she stated she had called Pre-admit which than told her to call our office.    Called Krupa Choi with no answer, lvm to call me back to see what needs to be done, but looks like she has a pre admit appt on 10/28 to get COVID tested but stated she unlikely to get that since she does have a cold, waiting for Krupa to call back to see what the next steps will be.

## 2021-11-01 ENCOUNTER — OFFICE VISIT (OUTPATIENT)
Dept: MEDICAL GROUP | Facility: MEDICAL CENTER | Age: 76
End: 2021-11-01
Payer: MEDICARE

## 2021-11-01 VITALS
DIASTOLIC BLOOD PRESSURE: 50 MMHG | HEIGHT: 66 IN | TEMPERATURE: 97.5 F | OXYGEN SATURATION: 96 % | HEART RATE: 71 BPM | WEIGHT: 138.67 LBS | SYSTOLIC BLOOD PRESSURE: 90 MMHG | BODY MASS INDEX: 22.29 KG/M2

## 2021-11-01 DIAGNOSIS — Z86.79 S/P AAA (ABDOMINAL AORTIC ANEURYSM) REPAIR: ICD-10-CM

## 2021-11-01 DIAGNOSIS — I25.10 CORONARY ARTERY DISEASE INVOLVING NATIVE CORONARY ARTERY OF NATIVE HEART WITHOUT ANGINA PECTORIS: ICD-10-CM

## 2021-11-01 DIAGNOSIS — I10 ESSENTIAL HYPERTENSION: ICD-10-CM

## 2021-11-01 DIAGNOSIS — N18.31 STAGE 3A CHRONIC KIDNEY DISEASE: ICD-10-CM

## 2021-11-01 DIAGNOSIS — Z99.81 ON SUPPLEMENTAL OXYGEN THERAPY: ICD-10-CM

## 2021-11-01 DIAGNOSIS — J96.01 ACUTE RESPIRATORY FAILURE WITH HYPOXIA (HCC): ICD-10-CM

## 2021-11-01 DIAGNOSIS — R93.89 ABNORMAL CT SCAN: ICD-10-CM

## 2021-11-01 DIAGNOSIS — I71.40 ABDOMINAL AORTIC ANEURYSM, WITHOUT RUPTURE: ICD-10-CM

## 2021-11-01 DIAGNOSIS — Z86.73 HISTORY OF TIA (TRANSIENT ISCHEMIC ATTACK): ICD-10-CM

## 2021-11-01 DIAGNOSIS — R91.8 LUNG MASS: ICD-10-CM

## 2021-11-01 DIAGNOSIS — Z98.890 S/P AAA (ABDOMINAL AORTIC ANEURYSM) REPAIR: ICD-10-CM

## 2021-11-01 PROCEDURE — 99214 OFFICE O/P EST MOD 30 MIN: CPT | Performed by: NURSE PRACTITIONER

## 2021-11-01 ASSESSMENT — FIBROSIS 4 INDEX: FIB4 SCORE: 5.99

## 2021-11-01 NOTE — PROGRESS NOTES
Chief Complaint   Patient presents with   • Other     1 MTH       Subjective:     HPI:     Evie Morillo is a 76 y.o. female here to discuss the evaluation and management of:    1 month follow-up.    At her last visit she had the flu vaccine. She reportedly had chills, fatigue, vomiting and diarrhea. This lasted 2-3 days. Then went to [facility] for UTI and was treated with abx but reports throwing up from the abx. Feeling run down for about one week.  Has a cold since last week. Has been taking Coricidin-has been helpful but then coughing again. No fevers. Having sneezing, cough, runny nose. Trying to stay hydrated. Has hx of loose stools. No blood. Not watery.    Use of supplemental oxygen  Acute respiratory failure with hypoxia  Since her last visit she has been doing well on 1L of oxygen- reports 94-97%. Will take off to vacuum and will go to 89%.     Lung mass  She mentions that her lung biopsy will be postponed until the 18th of this month.   Now followed by [facility].     History of AAA repair  AAA repair with an endograft and 2 femoral stents with Dr. Wood at [facility] in 2020.  Has not followed up with Dr. Wood.       US-Aorta/iliac duplex 03/17/2021  Residual aneurysm measures 4.9 x 4.9 cm. No endoleak identified.    ECHO 8/14/2021  Compared to the prior echocardiogram 7/1/2020 - no significant changes.  Left ventricular systolic function is normal.   Left ventricular ejection fraction is visually estimated to be 55%.   No regional wall motion abnormality.  Mild to moderate aortic insufficiency.  Ascending aorta is dilated with a diameter of 4.3 cm.    CAD  Hx of x2 stents in [place] in  2009.  Denies any chest pain.  Followed by Dr. Pulido.  Last follow-up June 2021.     Hx of TIA  Reports has had 3 TIA's -reports these were in [place] in 2001 and 2003.  Taking Atorvastatin 80mg. No myalgias.  Has been on Asprin 81mg daily however more recently had mentioned to IO services she stopped  taking as she did not like the way it made her feel.     HTN  She reports she is taking losartan 50 mg and spironolactone 50 mg for this.  Reports compliance with this.  Denies any CP.  Complains of fatigue.     Thyroid  Taking Levothyroxine 88mcg daily for this.  Complains of fatigue.    Patient comments that her  is not very supportive at home.  She is feeling very fatigued and tired and is not able to rest very well.      ROS:  Denies any Headache, Blurred Vision, Confusion, Chest pain,  Shortness of breath,  Abdominal pain, Changes of bowel or bladder, Lower ext edema, Fevers, Nights sweats, Weight Changes, Focal weakness or numbness.  And all other systems reviewed and are all negative. POSITIVE FOR : see above        Current Outpatient Medications:   •  escitalopram (LEXAPRO) 20 MG tablet, Take 1 Tablet by mouth every day., Disp: 30 Tablet, Rfl: 11  •  levalbuterol (XOPENEX HFA) 45 MCG/ACT inhaler, Inhale 1-2 Puffs every four hours as needed for Shortness of Breath., Disp: 15 g, Rfl: 0  •  ipratropium-albuterol (DUONEB) 0.5-2.5 (3) MG/3ML nebulizer solution, Take 3 mL by nebulization every four hours as needed for Shortness of Breath., Disp: 30 Each, Rfl: 0  •  spironolactone (ALDACTONE) 50 MG Tab, Take 50 mg by mouth every morning., Disp: , Rfl:   •  Multiple Vitamins-Minerals (DAILY MULTIVITAMIN PO), Take 1 Tablet by mouth every morning., Disp: , Rfl:   •  losartan (COZAAR) 100 MG Tab, Take 0.5 Tablets by mouth every day., Disp: 90 tablet, Rfl: 1  •  levothyroxine (SYNTHROID) 88 MCG Tab, Take 1 tablet by mouth every morning on an empty stomach., Disp: 90 tablet, Rfl: 3  •  atorvastatin (LIPITOR) 80 MG tablet, TAKE 1 TABLET BY MOUTH EVERY DAY, Disp: 90 Tablet, Rfl: 3  •  traZODone (DESYREL) 150 MG Tab, TAKE 1 TABLET BY MOUTH TWICE DAILY, Disp: 180 Tablet, Rfl: 3    Allergies   Allergen Reactions   • Pcn [Penicillins] Shortness of Breath, Rash and Swelling      Tolerated ceftriaxone 8/15/21.   • Other  "Environmental Runny Nose     Dogs, cats       Past Medical History:   Diagnosis Date   • AAA (abdominal aortic aneurysm) (HCC)     had surgery for this.   • Arthritis     fingers   • Bladder infection 10/15/2021    antibiotic course done   • Bowel habit changes     diarrhea   • Bronchitis    • CAD (coronary artery disease) 10/4/2012    2 stents [place] 2009    • Cataract     right eye   • Chronic insomnia 5/23/2016   • COPD (chronic obstructive pulmonary disease) (HCC)     \"I think\"   • Dental disorder     upper denture   • Depression 9/26/2013    not a current issue-not on medications   • Diverticula of colon    • Environmental and seasonal allergies    • High cholesterol    • Hyperlipidemia 10/4/2012   • Hypertension    • Hypothyroid 10/17/2013   • Lung nodule     Right   • Lupus (HCC)    • Nonrheumatic aortic valve insufficiency     Dr. Pulido   • Oxygen dependent     1 L O2 NC   • Pneumonia    • Postural hypotension 10/7/2014     IMO load March 2020   • Sleep apnea     (+) ADRIANNA , not using CPAP   • TIA (transient ischemic attack) 10/04/2012    2001 (x2) and then one in 2003   • Tobacco abuse 4/20/2016     Past Surgical History:   Procedure Laterality Date   • NE DX BONE MARROW ASPIRATIONS Bilateral 7/14/2021    Procedure: ASPIRATION, BONE MARROW - VEGA;  Surgeon: Jin Conrad M.D.;  Location: ENDOSCOPY [facility];  Service: Orthopedics   • NE DX BONE MARROW BIOPSIES Bilateral 7/14/2021    Procedure: BIOPSY, BONE MARROW, USING NEEDLE OR TROCAR;  Surgeon: Jin Conrad M.D.;  Location: ENDOSCOPY [facility];  Service: Orthopedics   • AAA WITH STENT GRAFT  02/2020   • STENT PLACEMENT  2008    x 2 Dr Can in [place]   • OTHER Left     plantar wart removed on left foot   • OTHER      throat polyps removed (non cancerous) > 20 years ago     Family History   Problem Relation Age of Onset   • Heart Disease Mother    • Heart Attack Mother    • Heart Disease Father    • Heart Attack Father    • Cancer " Maternal Grandmother         stomach   • Diabetes Maternal Grandmother    • Cancer Maternal Grandfather         brain   • Stroke Paternal Grandmother    • Cancer Other      Social History     Socioeconomic History   • Marital status:      Spouse name: Not on file   • Number of children: Not on file   • Years of education: Not on file   • Highest education level: Not on file   Occupational History   • Not on file   Tobacco Use   • Smoking status: Former Smoker     Packs/day: 0.50     Years: 40.00     Pack years: 20.00     Types: Cigarettes     Quit date: 2020     Years since quittin.7   • Smokeless tobacco: Never Used   Vaping Use   • Vaping Use: Never used   Substance and Sexual Activity   • Alcohol use: Not Currently   • Drug use: Not Currently   • Sexual activity: Not Currently     Partners: Male   Other Topics Concern   • Not on file   Social History Narrative    On disability      Social Determinants of Health     Financial Resource Strain:    • Difficulty of Paying Living Expenses: Not on file   Food Insecurity:    • Worried About Running Out of Food in the Last Year: Not on file   • Ran Out of Food in the Last Year: Not on file   Transportation Needs:    • Lack of Transportation (Medical): Not on file   • Lack of Transportation (Non-Medical): Not on file   Physical Activity:    • Days of Exercise per Week: Not on file   • Minutes of Exercise per Session: Not on file   Stress:    • Feeling of Stress : Not on file   Social Connections:    • Frequency of Communication with Friends and Family: Not on file   • Frequency of Social Gatherings with Friends and Family: Not on file   • Attends Anabaptism Services: Not on file   • Active Member of Clubs or Organizations: Not on file   • Attends Club or Organization Meetings: Not on file   • Marital Status: Not on file   Intimate Partner Violence:    • Fear of Current or Ex-Partner: Not on file   • Emotionally Abused: Not on file   • Physically Abused: Not  "on file   • Sexually Abused: Not on file   Housing Stability:    • Unable to Pay for Housing in the Last Year: Not on file   • Number of Places Lived in the Last Year: Not on file   • Unstable Housing in the Last Year: Not on file       Objective:     Vitals: BP (!) 90/50 (BP Location: Right arm, Patient Position: Sitting, BP Cuff Size: Adult)   Pulse 71   Temp 36.4 °C (97.5 °F) (Temporal)   Ht 1.664 m (5' 5.5\")   Wt 62.9 kg (138 lb 10.7 oz)   LMP  (LMP Unknown)   SpO2 96%   BMI 22.72 kg/m²    General: Alert, pleasant, NAD  HEENT: Normocephalic.  Neck supple.   Respiratory: no distress, no audible wheezing, RR -WNL  Skin: Warm, dry, no rashes.  Extremities: No leg edema. No discoloration  Neurological: No tremors  Psych:  Affect/mood is normal, judgement is good, memory is intact, grooming is appropriate.    Assessment/Plan:     Evie was seen today for other.    Diagnoses and all orders for this visit:    Abnormal CT scan  Lung mass  Pending biopsy on the 18th of this month.  Continue to follow-up with hematology oncology.    Acute respiratory failure with hypoxia (HCC)  On supplemental oxygen therapy  Acute, stable.  Patient saturations have been stable and in the higher 90s on 1 L.  Patient will be having upcoming lung biopsy-recommend continued oxygen supplementation.  Reevaluate ability to wean off of oxygen after.    Coronary artery disease involving native coronary artery of native heart without angina pectoris  Chronic, stable.  Patient is asymptomatic today. On high dose statin. She stopped her aspirin therapy.  Recommend resuming after she has had her biopsy- pending no complications.  Continue to avoid tobacco products.    Recommend following up with cardiology if she would like to discuss this further.    Abdominal aortic aneurysm, without rupture (HCC)   Stable.  Ultrasound with 4.9 x 4.9 cm residual aneurysm.  Recommend follow-up with vascular surgery for continued surveillance and updated " ultrasound.    S/P AAA (abdominal aortic aneurysm) repair  Overdue for follow-up with vascular surgeon.    Essential hypertension  Blood pressure is on the lower side today.  She is asymptomatic in the clinic.  Recommend continue to check her blood pressure at home, stay hydrated.  May consider reducing her losartan to 25 mg.    Stage 3a chronic kidney disease (HCC)  Stable.  Last GFR above 60.  Continue to monitor labs.    History of TIA  Asymptomatic.  Recommend continued high-dose statin therapy, optimal blood pressure, avoid tobacco products.      Return in about 3 months (around 2/1/2022).        Cindy NAIR.

## 2021-11-08 ENCOUNTER — APPOINTMENT (OUTPATIENT)
Dept: HEMATOLOGY ONCOLOGY | Facility: MEDICAL CENTER | Age: 76
End: 2021-11-08
Payer: MEDICARE

## 2021-11-10 DIAGNOSIS — E78.5 HYPERLIPIDEMIA, UNSPECIFIED HYPERLIPIDEMIA TYPE: ICD-10-CM

## 2021-11-10 RX ORDER — TRAZODONE HYDROCHLORIDE 150 MG/1
TABLET ORAL
Qty: 180 TABLET | Refills: 3 | Status: SHIPPED | OUTPATIENT
Start: 2021-11-10 | End: 2022-06-29

## 2021-11-10 RX ORDER — ATORVASTATIN CALCIUM 80 MG/1
TABLET, FILM COATED ORAL
Qty: 90 TABLET | Refills: 3 | Status: SHIPPED | OUTPATIENT
Start: 2021-11-10 | End: 2022-06-29

## 2021-11-11 PROBLEM — Z86.73 HISTORY OF TIA (TRANSIENT ISCHEMIC ATTACK): Status: ACTIVE | Noted: 2021-11-11

## 2021-11-11 PROBLEM — Z99.81 ON SUPPLEMENTAL OXYGEN THERAPY: Status: ACTIVE | Noted: 2021-11-11

## 2021-11-15 ENCOUNTER — APPOINTMENT (OUTPATIENT)
Dept: ADMISSIONS | Facility: MEDICAL CENTER | Age: 76
End: 2021-11-15
Attending: NURSE PRACTITIONER
Payer: MEDICARE

## 2021-11-15 DIAGNOSIS — Z01.812 PRE-OPERATIVE LABORATORY EXAMINATION: ICD-10-CM

## 2021-11-15 LAB
ERYTHROCYTE [DISTWIDTH] IN BLOOD BY AUTOMATED COUNT: 52.8 FL (ref 35.9–50)
HCT VFR BLD AUTO: 35.4 % (ref 37–47)
HGB BLD-MCNC: 11.2 G/DL (ref 12–16)
INR PPP: 1.02 (ref 0.87–1.13)
MCH RBC QN AUTO: 31.3 PG (ref 27–33)
MCHC RBC AUTO-ENTMCNC: 31.6 G/DL (ref 33.6–35)
MCV RBC AUTO: 98.9 FL (ref 81.4–97.8)
PLATELET # BLD AUTO: 142 K/UL (ref 164–446)
PMV BLD AUTO: 10.8 FL (ref 9–12.9)
PROTHROMBIN TIME: 13.1 SEC (ref 12–14.6)
RBC # BLD AUTO: 3.58 M/UL (ref 4.2–5.4)
SARS-COV-2 RNA RESP QL NAA+PROBE: NOTDETECTED
SPECIMEN SOURCE: NORMAL
WBC # BLD AUTO: 3.3 K/UL (ref 4.8–10.8)

## 2021-11-15 PROCEDURE — U0003 INFECTIOUS AGENT DETECTION BY NUCLEIC ACID (DNA OR RNA); SEVERE ACUTE RESPIRATORY SYNDROME CORONAVIRUS 2 (SARS-COV-2) (CORONAVIRUS DISEASE [COVID-19]), AMPLIFIED PROBE TECHNIQUE, MAKING USE OF HIGH THROUGHPUT TECHNOLOGIES AS DESCRIBED BY CMS-2020-01-R: HCPCS

## 2021-11-15 PROCEDURE — 36415 COLL VENOUS BLD VENIPUNCTURE: CPT

## 2021-11-15 PROCEDURE — C9803 HOPD COVID-19 SPEC COLLECT: HCPCS

## 2021-11-15 PROCEDURE — 85610 PROTHROMBIN TIME: CPT

## 2021-11-15 PROCEDURE — 85027 COMPLETE CBC AUTOMATED: CPT

## 2021-11-15 PROCEDURE — U0005 INFEC AGEN DETEC AMPLI PROBE: HCPCS

## 2021-11-17 ENCOUNTER — TELEPHONE (OUTPATIENT)
Dept: RESPIRATORY THERAPY | Facility: MEDICAL CENTER | Age: 76
End: 2021-11-17

## 2021-11-17 NOTE — TELEPHONE ENCOUNTER
COPD program follow up phone call:    Did you stop smoking because of the program? Quit prior  Not smoking at time of follow up? yes  Did you refill your medications? yes  Did you take them everyday as prescribed? yes  Have you seen [facility] since discharging from the hospital? no  Have you seen Home Health since discharging from the hospital? n/a  Have you seen [facility] since discharging from the hospital? n/a  Have you seen [facility] since discharging from the hospital? n/a

## 2021-11-18 ENCOUNTER — APPOINTMENT (OUTPATIENT)
Dept: RADIOLOGY | Facility: MEDICAL CENTER | Age: 76
End: 2021-11-18
Attending: NURSE PRACTITIONER
Payer: MEDICARE

## 2021-11-18 ENCOUNTER — APPOINTMENT (OUTPATIENT)
Dept: RADIOLOGY | Facility: MEDICAL CENTER | Age: 76
End: 2021-11-18
Attending: STUDENT IN AN ORGANIZED HEALTH CARE EDUCATION/TRAINING PROGRAM
Payer: MEDICARE

## 2021-11-18 ENCOUNTER — HOSPITAL ENCOUNTER (OUTPATIENT)
Facility: MEDICAL CENTER | Age: 76
End: 2021-11-18
Attending: INTERNAL MEDICINE | Admitting: STUDENT IN AN ORGANIZED HEALTH CARE EDUCATION/TRAINING PROGRAM
Payer: MEDICARE

## 2021-11-18 VITALS
DIASTOLIC BLOOD PRESSURE: 63 MMHG | BODY MASS INDEX: 21.87 KG/M2 | SYSTOLIC BLOOD PRESSURE: 129 MMHG | RESPIRATION RATE: 12 BRPM | WEIGHT: 139.33 LBS | HEIGHT: 67 IN | OXYGEN SATURATION: 97 % | TEMPERATURE: 97.7 F | HEART RATE: 59 BPM

## 2021-11-18 DIAGNOSIS — R91.8 LUNG MASS: ICD-10-CM

## 2021-11-18 LAB — PATHOLOGY CONSULT NOTE: NORMAL

## 2021-11-18 PROCEDURE — 88305 TISSUE EXAM BY PATHOLOGIST: CPT

## 2021-11-18 PROCEDURE — 700105 HCHG RX REV CODE 258: Performed by: STUDENT IN AN ORGANIZED HEALTH CARE EDUCATION/TRAINING PROGRAM

## 2021-11-18 PROCEDURE — 88313 SPECIAL STAINS GROUP 2: CPT

## 2021-11-18 PROCEDURE — 71045 X-RAY EXAM CHEST 1 VIEW: CPT

## 2021-11-18 PROCEDURE — 700111 HCHG RX REV CODE 636 W/ 250 OVERRIDE (IP): Performed by: STUDENT IN AN ORGANIZED HEALTH CARE EDUCATION/TRAINING PROGRAM

## 2021-11-18 PROCEDURE — 88342 IMHCHEM/IMCYTCHM 1ST ANTB: CPT

## 2021-11-18 PROCEDURE — 160002 HCHG RECOVERY MINUTES (STAT)

## 2021-11-18 PROCEDURE — 88341 IMHCHEM/IMCYTCHM EA ADD ANTB: CPT | Mod: 91

## 2021-11-18 PROCEDURE — 700111 HCHG RX REV CODE 636 W/ 250 OVERRIDE (IP)

## 2021-11-18 PROCEDURE — 99153 MOD SED SAME PHYS/QHP EA: CPT

## 2021-11-18 RX ORDER — SODIUM CHLORIDE 9 MG/ML
500 INJECTION, SOLUTION INTRAVENOUS
Status: DISCONTINUED | OUTPATIENT
Start: 2021-11-18 | End: 2021-11-18 | Stop reason: HOSPADM

## 2021-11-18 RX ORDER — SODIUM CHLORIDE 9 MG/ML
INJECTION, SOLUTION INTRAVENOUS CONTINUOUS
Status: DISCONTINUED | OUTPATIENT
Start: 2021-11-18 | End: 2021-11-18 | Stop reason: HOSPADM

## 2021-11-18 RX ORDER — MIDAZOLAM HYDROCHLORIDE 1 MG/ML
.5-2 INJECTION INTRAMUSCULAR; INTRAVENOUS PRN
Status: DISCONTINUED | OUTPATIENT
Start: 2021-11-18 | End: 2021-11-18 | Stop reason: HOSPADM

## 2021-11-18 RX ORDER — MIDAZOLAM HYDROCHLORIDE 1 MG/ML
INJECTION INTRAMUSCULAR; INTRAVENOUS
Status: COMPLETED
Start: 2021-11-18 | End: 2021-11-18

## 2021-11-18 RX ORDER — ONDANSETRON 2 MG/ML
4 INJECTION INTRAMUSCULAR; INTRAVENOUS PRN
Status: DISCONTINUED | OUTPATIENT
Start: 2021-11-18 | End: 2021-11-18 | Stop reason: HOSPADM

## 2021-11-18 RX ORDER — ONDANSETRON 2 MG/ML
INJECTION INTRAMUSCULAR; INTRAVENOUS
Status: DISCONTINUED
Start: 2021-11-18 | End: 2021-11-18 | Stop reason: HOSPADM

## 2021-11-18 RX ADMIN — FENTANYL CITRATE 25 MCG: 50 INJECTION, SOLUTION INTRAMUSCULAR; INTRAVENOUS at 11:17

## 2021-11-18 RX ADMIN — FENTANYL CITRATE 25 MCG: 50 INJECTION, SOLUTION INTRAMUSCULAR; INTRAVENOUS at 11:15

## 2021-11-18 RX ADMIN — FENTANYL CITRATE 25 MCG: 50 INJECTION, SOLUTION INTRAMUSCULAR; INTRAVENOUS at 10:58

## 2021-11-18 RX ADMIN — SODIUM CHLORIDE: 9 INJECTION, SOLUTION INTRAVENOUS at 10:22

## 2021-11-18 RX ADMIN — FENTANYL CITRATE 25 MCG: 50 INJECTION, SOLUTION INTRAMUSCULAR; INTRAVENOUS at 11:03

## 2021-11-18 RX ADMIN — MIDAZOLAM HYDROCHLORIDE 0.5 MG: 1 INJECTION, SOLUTION INTRAMUSCULAR; INTRAVENOUS at 10:58

## 2021-11-18 RX ADMIN — MIDAZOLAM HYDROCHLORIDE 0.5 MG: 1 INJECTION, SOLUTION INTRAMUSCULAR; INTRAVENOUS at 11:15

## 2021-11-18 RX ADMIN — MIDAZOLAM HYDROCHLORIDE 0.5 MG: 1 INJECTION, SOLUTION INTRAMUSCULAR; INTRAVENOUS at 11:03

## 2021-11-18 RX ADMIN — FENTANYL CITRATE 50 MCG: 50 INJECTION, SOLUTION INTRAMUSCULAR; INTRAVENOUS at 11:21

## 2021-11-18 RX ADMIN — MIDAZOLAM HYDROCHLORIDE 0.5 MG: 1 INJECTION, SOLUTION INTRAMUSCULAR; INTRAVENOUS at 11:17

## 2021-11-18 RX ADMIN — MIDAZOLAM HYDROCHLORIDE 0.5 MG: 1 INJECTION, SOLUTION INTRAMUSCULAR; INTRAVENOUS at 11:22

## 2021-11-18 ASSESSMENT — PAIN DESCRIPTION - PAIN TYPE
TYPE: SURGICAL PAIN

## 2021-11-18 ASSESSMENT — FIBROSIS 4 INDEX: FIB4 SCORE: 7.09

## 2021-11-18 NOTE — PROGRESS NOTES
Pt transported from IR CT to PACU via stretcher w/ IR RN and IR RT on continuous pulse ox monitoring, HOB >30 degrees. Pt arrived in PACU 96% on 4L NC, drowsy but following commands, denies pain, R chest drsg CDI.

## 2021-11-18 NOTE — OR NURSING
First CXR results up. Boxed lunch provided to pt. Report called to IVANA Haile. Update given to pt's , Wai.

## 2021-11-18 NOTE — OR SURGEON
Immediate Post- Operative Note        Findings: Right lung nodule      Procedure(s): Right lung nodule biopsy      Estimated Blood Loss: Less than 5 ml        Complications: None            11/18/2021     11:32 AM     Callum Kearney M.D.

## 2021-11-18 NOTE — DISCHARGE INSTRUCTIONS
ACTIVITY: Rest and take it easy for the first 24 hours.  A responsible adult is recommended to remain with you during that time.  It is normal to feel sleepy.  We encourage you to not do anything that requires balance, judgment or coordination.    MILD FLU-LIKE SYMPTOMS ARE NORMAL. YOU MAY EXPERIENCE GENERALIZED MUSCLE ACHES, THROAT IRRITATION, HEADACHE AND/OR SOME NAUSEA.    FOR 24 HOURS DO NOT:  Drive, operate machinery or run household appliances.  Drink beer or alcoholic beverages.   Make important decisions or sign legal documents.    SPECIAL INSTRUCTIONS:   Lung Biopsy, Care After  This sheet gives you information about how to care for yourself after your procedure. Your health care provider may also give you more specific instructions depending on the type of biopsy you had. If you have problems or questions, contact your health care provider.  What can I expect after the procedure?  After the procedure, it is common to have:  · A cough.  · A sore throat.  · Pain where a needle, bronchoscope, or incision was used to collect a biopsy sample (biopsy site).  Follow these instructions at home:  Medicines  · Take over-the-counter and prescription medicines only as told by your health care provider.  · Do not drink alcohol if your health care provider tells you not to drink.  · Ask your health care provider if the medicine prescribed to you:  ? Requires you to avoid driving or using heavy machinery.  ? Can cause constipation. You may need to take these actions to prevent or treat constipation:  § Drink enough fluid to keep your urine pale yellow.  § Take over-the-counter or prescription medicines.  § Eat foods that are high in fiber, such as beans, whole grains, and fresh fruits and vegetables.  § Limit foods that are high in fat and processed sugars, such as fried or sweet foods.  · Do not drive for 24 hours if you were given a sedative.  Biopsy site care  · Follow instructions from your health care provider  about how to take care of your biopsy site. Make sure you:  ? Wash your hands with soap and water before and after you change your bandage (dressing). If soap and water are not available, use hand .  ? Change your dressing as told by your health care provider.  ? Leave stitches (sutures), skin glue, or adhesive strips in place. These skin closures may need to stay in place for 2 weeks or longer. If adhesive strip edges start to loosen and curl up, you may trim the loose edges. Do not remove adhesive strips completely unless your health care provider tells you to do that.  · Do not take baths, swim, or use a hot tub until your health care provider approves. Ask your health care provider if you may take showers. You may only be allowed to take sponge baths.  · Check your biopsy site every day for signs of infection. Check for:  ? Redness, swelling, or more pain.  ? Fluid or blood.  ? Warmth.  ? Pus or a bad smell.  General instructions  · Return to your normal activities as told by your health care provider. Ask your health care provider what activities are safe for you.  · It is up to you to get the results of your procedure. Ask your health care provider, or the department that is doing the procedure, when your results will be ready.  · Keep all follow-up visits as told by your health care provider. This is important.  Contact a health care provider if:  · You have a fever.  · You have redness, swelling, or more pain around your biopsy site.  · You have fluid or blood coming from your biopsy site.  · Your biopsy site feels warm to the touch.  · You have pus or a bad smell coming from your biopsy site.  · You have pain that does not get better with medicine.  Get help right away if:  · You cough up blood.  · You have trouble breathing.  · You have chest pain.  · You lose consciousness.  Summary  · After the procedure, it is common to have a sore throat and a cough.  · Return to your normal activities as told  by your health care provider. Ask your health care provider what activities are safe for you.  · Take over-the-counter and prescription medicines only as told by your health care provider.  · Report any unusual symptoms to your health care provider.  This information is not intended to replace advice given to you by your health care provider. Make sure you discuss any questions you have with your health care provider.  Document Released: 01/16/2018 Document Revised: 01/22/2020 Document Reviewed: 01/16/2018  [employer] Patient Education © 2020 [employer]      DIET: To avoid nausea, slowly advance diet as tolerated, avoiding spicy or greasy foods for the first day.  Add more substantial food to your diet according to your physician's instructions.  Babies can be fed formula or breast milk as soon as they are hungry.  INCREASE FLUIDS AND FIBER TO AVOID CONSTIPATION.    SURGICAL DRESSING/BATHING: You may remove you dressing in 24 hours, you may shower at that point, avoid submerging incision until cleared by MD.    FOLLOW-UP APPOINTMENT:  A follow-up appointment should be arranged with your doctor in 1-2 Weeks; call to schedule.    You should CALL YOUR PHYSICIAN if you develop:  Fever greater than 101 degrees F.  Pain not relieved by medication, or persistent nausea or vomiting.  Excessive bleeding (blood soaking through dressing) or unexpected drainage from the wound.  Extreme redness or swelling around the incision site, drainage of pus or foul smelling drainage.  Inability to urinate or empty your bladder within 8 hours.  Problems with breathing or chest pain.    You should call 911 if you develop problems with breathing or chest pain.  If you are unable to contact your doctor or surgical center, you should go to the nearest emergency room or urgent care center.      Physician's telephone #: [phone] Dr. Kearney    If any questions arise, call your doctor.  If your doctor is not available, please feel free to call  the Surgical Center at [phone]. The Contact Center is open Monday through Friday 7AM to 5PM and may speak to a nurse at [phone], or toll free at [phone].     A registered nurse may call you a few days after your surgery to see how you are doing after your procedure.    MEDICATIONS: Resume taking daily medication.  Take prescribed pain medication with food.  If no medication is prescribed, you may take non-aspirin pain medication if needed.  PAIN MEDICATION CAN BE VERY CONSTIPATING.  Take a stool softener or laxative such as senokot, pericolace, or milk of magnesia if needed.    Last pain medication given at No oral pain medication given during recovery.    If your physician has prescribed pain medication that includes Acetaminophen (Tylenol), do not take additional Acetaminophen (Tylenol) while taking the prescribed medication.    Depression / Suicide Risk    As you are discharged from this [facility] facility, it is important to learn how to keep safe from harming yourself.    Recognize the warning signs:  · Abrupt changes in personality, positive or negative- including increase in energy   · Giving away possessions  · Change in eating patterns- significant weight changes-  positive or negative  · Change in sleeping patterns- unable to sleep or sleeping all the time   · Unwillingness or inability to communicate  · Depression  · Unusual sadness, discouragement and loneliness  · Talk of wanting to die  · Neglect of personal appearance   · Rebelliousness- reckless behavior  · Withdrawal from people/activities they love  · Confusion- inability to concentrate     If you or a loved one observes any of these behaviors or has concerns about self-harm, here's what you can do:  · Talk about it- your feelings and reasons for harming yourself  · Remove any means that you might use to hurt yourself (examples: pills, rope, extension cords, firearm)  · Get professional help from the community (Mental Health,  Substance Abuse, psychological counseling)  · Do not be alone:Call your Safe Contact- someone whom you trust who will be there for you.  · Call your local CRISIS HOTLINE 177-6961 or 776-281-9745  · Call your local Children's Mobile Crisis Response Team [place] (926) 963-3926 or www.[employer]  · Call the toll free National Suicide Prevention Hotlines   · National Suicide Prevention Lifeline 920-610-ZHPO (7422)  · National Hope Line Network 800-SUICIDE (292-8408)

## 2021-11-18 NOTE — OR NURSING
Call out to film room regarding pending results for first CXR. No new updates.  Second CXR scheduled for 1432.

## 2021-11-18 NOTE — OR NURSING
Assist RN:  Arrived to Phase II after report from IVANA Haile.     VSS, denies nausea, reports pain 0/10. Ambulated from gurney to chair with standby assist.    Surgical site CDI with gauze/tegaderm to right anterior chest.     Alarms on and set to appropriate parameters. Call light within reach, rounding in place.

## 2021-11-18 NOTE — PROGRESS NOTES
CT NOTE: right lung mass biopsy performed by Dr. Kearney under moderate sedation. Pt tolerated well, VSS, 98% 4L NC (on 1L baseline), right anterior chest drsg CDI.     Post procedure recovery orders to be placed by Dr. Kearney and released by PACU RN - orders will include post biopsy CXRs (1 hr post and 3 hrs post procedure).

## 2021-11-19 NOTE — OR NURSING
1545: Assumed care of pt. Pt resting comfortably in [facility] awaiting repeat chest xray. VSS, no c/o pain. Dressing CDI.    1640: Inquired about xray. Tech states that she is on list and they will be coming as soon as possible. Offered pt beverages. Walked to bathroom.     1710: Inquired again about xray per pt request. Updated pt. Offered beverages.    1745: Chest xray complete. Discharge order from Dr. Kearney. Discharge instructions provided, pt verbalized understanding and questions answered.      1820: Pt discharged in wheelchair in good condition.

## 2021-11-23 ENCOUNTER — OFFICE VISIT (OUTPATIENT)
Dept: HEMATOLOGY ONCOLOGY | Facility: MEDICAL CENTER | Age: 76
End: 2021-11-23
Payer: MEDICARE

## 2021-11-23 VITALS
TEMPERATURE: 99.3 F | HEART RATE: 79 BPM | DIASTOLIC BLOOD PRESSURE: 64 MMHG | BODY MASS INDEX: 22.46 KG/M2 | HEIGHT: 66 IN | SYSTOLIC BLOOD PRESSURE: 108 MMHG | RESPIRATION RATE: 17 BRPM | OXYGEN SATURATION: 97 % | WEIGHT: 139.77 LBS

## 2021-11-23 DIAGNOSIS — C34.11 MALIGNANT NEOPLASM OF UPPER LOBE OF RIGHT LUNG (HCC): ICD-10-CM

## 2021-11-23 PROCEDURE — 99214 OFFICE O/P EST MOD 30 MIN: CPT | Performed by: NURSE PRACTITIONER

## 2021-11-23 ASSESSMENT — ENCOUNTER SYMPTOMS
SHORTNESS OF BREATH: 0
WHEEZING: 0
COUGH: 0
WEIGHT LOSS: 0

## 2021-11-23 ASSESSMENT — FIBROSIS 4 INDEX: FIB4 SCORE: 7.09

## 2021-11-23 ASSESSMENT — PAIN SCALES - GENERAL: PAINLEVEL: NO PAIN

## 2021-11-23 NOTE — PROGRESS NOTES
Subjective     Ibeth Morillo is a 76 y.o. female who presents with Other (IC Est/ Biopsy Results)          HPI    Patient seen today in follow-up for lung biopsy results.  She presents accompanied by her  for today's visit.    Patient was seen back in October 2021 after hospitalization back in August 2021 for shortness of breath, fever, nausea/vomiting/diarrhea.  She was treated for pneumonia with antibiotics.  She was also discharged home with oxygen.  During that hospital visit on 8/13/2021 she had a CTA to rule out pulmonary emboli which was negative.  There was patchy reticular linear opacity in the right upper lobe which was thought to represent early Covid infiltrate.  She did test negative during her hospitalization for Covid.  Post hospital follow-up CT on 9/15/2021 showed no significant change in appearance of the irregular peripheral masslike opacity in the right upper lobe.  This measured 3.2 x 1.7 cm in size.  Based on these findings I did proceed with a CT-guided biopsy which patient presents today to review.    Patient overall stated she tolerated the biopsy well.  She does not have any residual pain or any changes in her respiratory status.  She is continued on continuous oxygen.  She has not been informed if or when she will be off this oxygen.  She is due to establish care with a pulmonologist but not until February 2022.  She denies any coughing, wheezing or shortness of breath.    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  Patient was aware of the biopsy as she did read the report via [employer] yesterday.  Discussed with patient the biopsy results and future plan of care.    Patient is known to this office as a hematology patient.  She was last seen by myself for hematology follow-up visit in July 2021 for neutropenia and macrocytosis.  Patient underwent bone marrow biopsy which was negative for hematologic issue.  She had full work-up for her  neutropenia/leukopenia and found a positive BIRDIE.  She has been referred to rheumatology back in July 2021 but is yet to establish care.  She was scheduled for 11/17/2021 however patient was overwhelmed with biopsy that she canceled that appointment and has not rescheduled with rheumatology yet.    Allergies   Allergen Reactions   • Pcn [Penicillins] Shortness of Breath, Rash and Swelling      Tolerated ceftriaxone 8/15/21.   • Other Environmental Runny Nose     Dogs, cats     Current Outpatient Medications on File Prior to Visit   Medication Sig Dispense Refill   • atorvastatin (LIPITOR) 80 MG tablet TAKE 1 TABLET BY MOUTH EVERY DAY (Patient taking differently: Take 80 mg by mouth every evening.) 90 Tablet 3   • traZODone (DESYREL) 150 MG Tab TAKE 1 TABLET BY MOUTH TWICE DAILY (Patient taking differently: Take 100 mg by mouth at bedtime.) 180 Tablet 3   • escitalopram (LEXAPRO) 20 MG tablet Take 1 Tablet by mouth every day. 30 Tablet 11   • levalbuterol (XOPENEX HFA) 45 MCG/ACT inhaler Inhale 1-2 Puffs every four hours as needed for Shortness of Breath. 15 g 0   • ipratropium-albuterol (DUONEB) 0.5-2.5 (3) MG/3ML nebulizer solution Take 3 mL by nebulization every four hours as needed for Shortness of Breath. 30 Each 0   • spironolactone (ALDACTONE) 50 MG Tab Take 50 mg by mouth every morning.     • Multiple Vitamins-Minerals (DAILY MULTIVITAMIN PO) Take 1 Tablet by mouth every morning.     • losartan (COZAAR) 100 MG Tab Take 0.5 Tablets by mouth every day. 90 tablet 1   • levothyroxine (SYNTHROID) 88 MCG Tab Take 1 tablet by mouth every morning on an empty stomach. 90 tablet 3     No current facility-administered medications on file prior to visit.         Review of Systems   Constitutional: Negative for malaise/fatigue and weight loss.   Respiratory: Negative for cough, shortness of breath and wheezing.               Objective     /64   Pulse 79   Temp 37.4 °C (99.3 °F) (Temporal)   Resp 17   Ht 1.676 m  "(5' 6\")   Wt 63.4 kg (139 lb 12.4 oz)   LMP  (LMP Unknown)   SpO2 97%   BMI 22.56 kg/m²      Physical Exam  Vitals reviewed.   Constitutional:       Appearance: Normal appearance.   Cardiovascular:      Rate and Rhythm: Normal rate and regular rhythm.   Pulmonary:      Effort: Pulmonary effort is normal. No respiratory distress.      Breath sounds: No wheezing.      Comments: Unable to auscultate lung sounds in the RUL - diminished elsewhere. On continuous O2.  Musculoskeletal:         General: No swelling or tenderness. Normal range of motion.   Skin:     General: Skin is warm and dry.   Neurological:      Mental Status: She is alert and oriented to person, place, and time.   Psychiatric:         Mood and Affect: Mood normal.         Behavior: Behavior normal.               FINAL DIAGNOSIS:     A. Right lung mass biopsy cores x3:          Small biopsy cores demonstrate morphologic findings most           consistent with a well-differentiated mucinous adenocarcinoma           with lepidic pattern.          Please see comment.     Comment: Patient's clinical history significant for abnormal imaging   study demonstrating right upper lung lobe mass measuring 3.2 cm in   greatest dimension is noted. Evaluation of the entire lesion in an   excisional specimen is required to rule out adenocarcinoma in-situ   versus invasion. Pertinent slides were also reviewed by two other   pathologists, Dr. Grant and Dr. Zhou, who agreed with the above   interpretation. There is a sufficient amount of tissue for PD-L1 study   but maybe insufficient for any other additional studies in paraffin   block A1.            Assessment & Plan        1. Malignant neoplasm of upper lobe of right lung (HCC)  [id number] BASE TO MID-THIGH       1.  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  At this time we will go ahead and proceed with staging work-up and complete the PET/CT.  Patient " will follow up with me in the clinic to review the results of PET/CT and discuss further plan of care.  Discussed possible surgical intervention which patient is not interested in versus radiation therapy.  However depending upon findings on PET/CT will then determine options.    Patient is scheduled for the PET/CT on 11/29/21 and she will follow-up with me in the clinic to review the results and discuss further plan of care at that time.  She did verbalize understanding's and agreement the plan.      Please note that this dictation was created using voice recognition software. I have made every reasonable attempt to correct obvious errors, but I expect that there are errors of grammar and possibly content that I did not discover before finalizing the note.

## 2021-11-24 ENCOUNTER — TELEPHONE (OUTPATIENT)
Dept: SLEEP MEDICINE | Facility: MEDICAL CENTER | Age: 76
End: 2021-11-24

## 2021-11-24 NOTE — TELEPHONE ENCOUNTER
"Patient has not established care with Pulmonary yet. New patient pending appointment not until 2/9/22    Patient states she saw Dr Kyung Lee yesterday and told her \"you don't need oxygen and to call us to discontinue it use\"    We can not order anything until patient establishes with us. I did explain that if her PCP Dr Busby and or Dr Lee wanted to send D/C order to her DME they could, BUT perhaps FIRST they may want to do a multi-oximetry walking test or overnight oximetry to be sure oxygen is not needed at night or with exertion. Patient in agreement and states she will keep oxygen until 2/9/22 appointment with our office.   "

## 2021-11-29 ENCOUNTER — HOSPITAL ENCOUNTER (OUTPATIENT)
Dept: RADIOLOGY | Facility: MEDICAL CENTER | Age: 76
End: 2021-11-29
Attending: NURSE PRACTITIONER
Payer: MEDICARE

## 2021-11-29 ENCOUNTER — OFFICE VISIT (OUTPATIENT)
Dept: HEMATOLOGY ONCOLOGY | Facility: MEDICAL CENTER | Age: 76
End: 2021-11-29
Payer: MEDICARE

## 2021-11-29 VITALS
TEMPERATURE: 99.2 F | HEART RATE: 77 BPM | OXYGEN SATURATION: 96 % | SYSTOLIC BLOOD PRESSURE: 104 MMHG | RESPIRATION RATE: 12 BRPM | HEIGHT: 66 IN | DIASTOLIC BLOOD PRESSURE: 64 MMHG | BODY MASS INDEX: 22.41 KG/M2 | WEIGHT: 139.44 LBS

## 2021-11-29 DIAGNOSIS — C34.11 MALIGNANT NEOPLASM OF UPPER LOBE OF RIGHT LUNG (HCC): ICD-10-CM

## 2021-11-29 DIAGNOSIS — R76.8 POSITIVE ANA (ANTINUCLEAR ANTIBODY): ICD-10-CM

## 2021-11-29 PROCEDURE — A9552 F18 FDG: HCPCS

## 2021-11-29 PROCEDURE — 99214 OFFICE O/P EST MOD 30 MIN: CPT | Performed by: NURSE PRACTITIONER

## 2021-11-29 ASSESSMENT — PAIN SCALES - GENERAL: PAINLEVEL: NO PAIN

## 2021-11-29 ASSESSMENT — ENCOUNTER SYMPTOMS
SHORTNESS OF BREATH: 1
FEVER: 0
CHILLS: 0

## 2021-11-29 ASSESSMENT — FIBROSIS 4 INDEX: FIB4 SCORE: 7.09

## 2021-11-30 NOTE — PROGRESS NOTES
Subjective     Ibeth Morillo is a 76 y.o. female who presents with Other (IC EST/PET Results )            HPI    Patient seen today in follow-up for PET/CT results.  She presents accompanied by her  for today's visit.    Patient seen back in October 2021 after hospitalization back in August 2021 for shortness of breath, fever, nausea/vomiting/diarrhea.  She was initially treated for pneumonia with antibiotics and discharged home with oxygen.  During that hospital visit on 8/13/2021 patient did have a CTA to rule out pulmonary emboli which was negative however there was patchy reticular linear opacity in the right upper lobe thought to represent early Covid infiltrate.  However she did test negative during her hospitalization for Covid.  Post hospitalization CT on 9/15/2021 showed no significant change in the appearance of the irregular peripheral masslike opacity in the right upper lobe that measured 3.2 x 1.7 cm in size.  Patient was sent for CT-guided biopsy of the mass.  Pathology was positive for well-differentiated mucinous adenocarcinoma with a lipidic pattern.  Based on these findings patient was sent for PET/CT for further evaluation which she presents today to review.    PET/CT completed this afternoon shows increased metabolic activity which does correspond with the known peripheral right upper lobe lung mass.  There is no evidence of metastatic disease throughout.  She also had some mildly increased activity in the esophagus which was likely inflammatory.  I personally reviewed the imaging report and images in detail, and I reviewed both of them with the patient and her  today.    Patient stated that she is anxious to no longer have to wear oxygen.  She is scheduled for new patient consultation with pulmonary until February 2022.  Patient did state however over the weekend she spent a few hours without her oxygen on and stated that she noticed her O2 saturation went down to 85%.  She stated  "that she was being a little bit more active during that time which likely was the cause for her decrease.  She does have some generalized shortness of breath at times with exertion.    Otherwise patient denies any other significant symptoms.  Allergies   Allergen Reactions   • Pcn [Penicillins] Shortness of Breath, Rash and Swelling      Tolerated ceftriaxone 8/15/21.   • Other Environmental Runny Nose     Dogs, cats     Current Outpatient Medications on File Prior to Visit   Medication Sig Dispense Refill   • atorvastatin (LIPITOR) 80 MG tablet TAKE 1 TABLET BY MOUTH EVERY DAY (Patient taking differently: Take 80 mg by mouth every evening.) 90 Tablet 3   • traZODone (DESYREL) 150 MG Tab TAKE 1 TABLET BY MOUTH TWICE DAILY (Patient taking differently: Take 100 mg by mouth at bedtime.) 180 Tablet 3   • escitalopram (LEXAPRO) 20 MG tablet Take 1 Tablet by mouth every day. 30 Tablet 11   • levalbuterol (XOPENEX HFA) 45 MCG/ACT inhaler Inhale 1-2 Puffs every four hours as needed for Shortness of Breath. 15 g 0   • ipratropium-albuterol (DUONEB) 0.5-2.5 (3) MG/3ML nebulizer solution Take 3 mL by nebulization every four hours as needed for Shortness of Breath. 30 Each 0   • spironolactone (ALDACTONE) 50 MG Tab Take 50 mg by mouth every morning.     • Multiple Vitamins-Minerals (DAILY MULTIVITAMIN PO) Take 1 Tablet by mouth every morning.     • losartan (COZAAR) 100 MG Tab Take 0.5 Tablets by mouth every day. 90 tablet 1   • levothyroxine (SYNTHROID) 88 MCG Tab Take 1 tablet by mouth every morning on an empty stomach. 90 tablet 3     No current facility-administered medications on file prior to visit.         Review of Systems   Constitutional: Positive for malaise/fatigue. Negative for chills and fever.   Respiratory: Positive for shortness of breath.         On continuous O2              Objective     /64   Pulse 77   Temp 37.3 °C (99.2 °F) (Temporal)   Resp 12   Ht 1.664 m (5' 5.5\")   Wt 63.3 kg (139 lb 7.1 " oz)   LMP  (LMP Unknown)   SpO2 96%   Breastfeeding No   BMI 22.85 kg/m²      Physical Exam  Vitals reviewed.   Constitutional:       Appearance: Normal appearance.   Cardiovascular:      Rate and Rhythm: Normal rate and regular rhythm.      Heart sounds: Normal heart sounds.   Pulmonary:      Effort: Pulmonary effort is normal. No respiratory distress.      Breath sounds: No wheezing.      Comments: Diminished throughout  Musculoskeletal:         General: Normal range of motion.   Skin:     General: Skin is warm and dry.   Neurological:      Mental Status: She is alert and oriented to person, place, and time.   Psychiatric:         Mood and Affect: Mood normal.         Behavior: Behavior normal.              [id number] BASE TO MID-THIGH    Result Date: 11/29/2021 11/29/2021 10:20 AM HISTORY/REASON FOR EXAM:  new dx of RUL lung cancer TECHNIQUE/EXAM DESCRIPTION AND NUMBER OF VIEWS: PET body imaging. Initially, 15.14 mCi F-18 FDG was administered intravenously under standardized conditions. Approximately 45 minutes after FDG administration, the patient was placed in the supine position on the PET CT table. Blood glucose level was 83 mg/dL. Low dose spiral CT imaging was performed from the skull base to the mid thighs. PET imaging was then performed from the skull base to the mid thighs. CT images, PET images, and PET/CT fused images were reviewed on a PACS 3D workstation. The limited non-contrast CT data are used primarily for attenuation correction and anatomic correlation.  Evaluation of solid organs and bowel are especially limited utilizing this technique. COMPARISON: None. FINDINGS: Head and neck: No abnormal focal FDG activity. Chest: Focal increased activity in the RIGHT midlung peripherally, max SUV 3.14.  No abnormal activity in the mediastinum or contralateral LEFT lung.  Mild diffuse increased activity in the esophagus. Abdomen and pelvis: No abnormal focal FDG activity. Musculoskeletal: No  abnormal focal FDG activity. Incidental findings on CT: Ill-defined peripheral opacity in the RIGHT upper lobe lung corresponding to the focal uptake on pet imaging.  Diffuse atherosclerotic calcifications.  Abdominal aortic aneurysm with stent graft in place colonic diverticula.     1.  Increased metabolic activity corresponds to ill-defined peripheral RIGHT upper lobe lung mass, consistent with known adenocarcinoma. 2.  No evidence for metastatic disease. 3.  Mildly increased activity in the esophagus, likely inflammatory.        PATHOLOGY  FINAL DIAGNOSIS:     A. Right lung mass biopsy cores x3:          Small biopsy cores demonstrate morphologic findings most           consistent with a well-differentiated mucinous adenocarcinoma           with lepidic pattern.          Please see comment.     Comment: Patient's clinical history significant for abnormal imaging   study demonstrating right upper lung lobe mass measuring 3.2 cm in   greatest dimension is noted. Evaluation of the entire lesion in an   excisional specimen is required to rule out adenocarcinoma in-situ   versus invasion. Pertinent slides were also reviewed by two other   pathologists, Dr. Grant and Dr. Zhou, who agreed with the above   interpretation. There is a sufficient amount of tissue for PD-L1 study   but maybe insufficient for any other additional studies in paraffin   block A1.            Assessment & Plan        1. Malignant neoplasm of upper lobe of right lung (HCC)  Diffusion Capacity (DLCO)    Referral to General Surgery    Referral to Radiation Oncology    REFERRAL TO ONCOLOGY NURSE NAVIGATOR   2. Positive BIRDIE (antinuclear antibody)         1.  Patient newly diagnosed with lung cancer in the right upper lobe via CT-guided biopsy.  PET scan shows metabolic activity in the nodule location and no other evidence of disease.  Discussed the possible options with patient with regards to management and treatment of this diagnosis.      -I will  place referral to thoracic surgery for evaluation and discussion on possible surgical interventions.  Discussed with patient that she would be able to have all of her questions answered during her consultation appointment.  Discussed she may or may not be a candidate for surgery, therefore I will also place a referral for lung function studies.  -I will also place referral to radiation oncology for further discussion and evaluation as well.  -I have also placed a referral to our oncology nurse navigator.    Patient in agreement with the plan stated above.  There is no further follow-up needed with [facility] at this time.    2.  Patient is questioning when she can discontinue the use of continuous oxygen.  She is not being seen by pulmonary until February 2022.  Patient mentioned that she did desaturate to 85% when she had her oxygen off at home.  Informed her that she can possibly discuss with her PCP, but with her having that desaturation at home it is unlikely that she can be off the oxygen at this time.  I will defer to PCP and/or pulmonary once she establishes care with them.    3.  As mentioned previously patient has been seen for hematology issue and was found to have a positive BIRDIE.  She was referred to rheumatology and was scheduled to establish care with him on 11/17/2021, however she canceled that appointment due to biopsy.  I have highly encouraged patient to contact the office and reschedule her consultation appointment with rheumatology.      Please note that this dictation was created using voice recognition software. I have made every reasonable attempt to correct obvious errors, but I expect that there are errors of grammar and possibly content that I did not discover before finalizing the note.

## 2021-12-02 DIAGNOSIS — C34.11 MALIGNANT NEOPLASM OF UPPER LOBE OF RIGHT LUNG (HCC): ICD-10-CM

## 2021-12-06 ENCOUNTER — HOSPITAL ENCOUNTER (OUTPATIENT)
Dept: PULMONOLOGY | Facility: MEDICAL CENTER | Age: 76
End: 2021-12-06
Attending: NURSE PRACTITIONER
Payer: MEDICARE

## 2021-12-06 DIAGNOSIS — C34.11 MALIGNANT NEOPLASM OF UPPER LOBE OF RIGHT LUNG (HCC): ICD-10-CM

## 2021-12-06 PROCEDURE — 94010 BREATHING CAPACITY TEST: CPT

## 2021-12-06 PROCEDURE — 94726 PLETHYSMOGRAPHY LUNG VOLUMES: CPT

## 2021-12-06 PROCEDURE — 94726 PLETHYSMOGRAPHY LUNG VOLUMES: CPT | Mod: 26 | Performed by: INTERNAL MEDICINE

## 2021-12-06 PROCEDURE — 94729 DIFFUSING CAPACITY: CPT

## 2021-12-06 PROCEDURE — 94729 DIFFUSING CAPACITY: CPT | Mod: 26 | Performed by: INTERNAL MEDICINE

## 2021-12-06 ASSESSMENT — PULMONARY FUNCTION TESTS
FVC_PERCENT_PREDICTED: 91
FEV1/FVC_PERCENT_LLN: 64.31
FVC: 2.51
FVC_PREDICTED: 2.74
FEV1/FVC_PREDICTED: 77.02
FEV1_LLN: 1.74
FEV1_PERCENT_PREDICTED: 79
FEV1_PREDICTED: 2.08
FEV1/FVC_PERCENT_PREDICTED: 85
FEV1: 1.65
FVC_LLN: 2.29
FEV1/FVC: 66
FEV1/FVC: 65.66
FEV1/FVC_PERCENT_PREDICTED: 87
FEV1/FVC_PERCENT_PREDICTED: 76

## 2021-12-09 ENCOUNTER — PATIENT OUTREACH (OUTPATIENT)
Dept: OTHER | Facility: MEDICAL CENTER | Age: 76
End: 2021-12-09

## 2021-12-09 NOTE — LETTER
[facility]   [address]  [place] [address]  Phone: [phone] - Fax: [phone]              Date: 12/09/21    Dear Ibeth,    I am a Cancer Nurse Navigator, a certified oncology nurse. My role is to assess any needs you may have with education, guidance and support. I am available to you and your family through any cancer treatment at [facility].       I am available to address your needs during your journey with the following services:     Care Coordination  I can assist you in facilitating communication between your cancer care treatment team to ensure timely treatment and follow-up.  I can also assist with transition of care back to your primary care provider, or other specialist, as needed.  My goal is to bridge gaps for you throughout the course of your active treatment.       Education Services  Understanding the recommended treatment course by your physician is key. I can provide educational resources personalized to your cancer diagnosis to help you understand your diagnosis and treatment. Please let me know if you would like to receive information about your diagnosis and treatment plan.  I am here to help.     Support Services/Resource Information  [facility] we offer a full scope of support services.  I can assist you with referral information to:  · Cancer Clinical Trials & Research  · Nutrition counseling  · Support groups  · Complementary Therapies such as Mind-Body Techniques Meditation  · Patient Financial Advocates  ·   · [facility], an [employer] affiliate office, our volunteers can assist you with accessing our [employer] library, support services information, head coverings and comfort items  · Community and national resources, included eligibility based lexus assistance and pharmaceutical access programs, if you are in need of additional information.     [facility] offers services that  include:  · Behavioral Health  · Genetic counseling & testing  · Acupuncture  · Lymphedema prevention/treatment program       I hope you have an excellent patient experience.  Please feel free to share with me your comments regarding the care you have received- we value your feedback.    Sincerely,     Ingrid Madden R.N.  Cancer Nurse Navigator    Office: [phone]  Main:  [phone]  Email:  [email or web address]

## 2021-12-09 NOTE — PROGRESS NOTES
Introduction letter sent via [employer].  Available for support, education, concerns and barriers to care.  Can contact navigator at [phone].

## 2021-12-13 ENCOUNTER — TELEPHONE (OUTPATIENT)
Dept: CARDIOLOGY | Facility: MEDICAL CENTER | Age: 76
End: 2021-12-13

## 2021-12-13 ENCOUNTER — HOSPITAL ENCOUNTER (OUTPATIENT)
Dept: RADIATION ONCOLOGY | Facility: MEDICAL CENTER | Age: 76
End: 2021-12-31
Attending: RADIOLOGY
Payer: MEDICARE

## 2021-12-13 VITALS
SYSTOLIC BLOOD PRESSURE: 125 MMHG | DIASTOLIC BLOOD PRESSURE: 82 MMHG | BODY MASS INDEX: 22.34 KG/M2 | TEMPERATURE: 98.1 F | WEIGHT: 139 LBS | HEART RATE: 65 BPM | HEIGHT: 66 IN | RESPIRATION RATE: 22 BRPM | OXYGEN SATURATION: 98 %

## 2021-12-13 DIAGNOSIS — C34.11 PRIMARY CANCER OF RIGHT UPPER LOBE OF LUNG (HCC): ICD-10-CM

## 2021-12-13 PROCEDURE — 99205 OFFICE O/P NEW HI 60 MIN: CPT | Performed by: RADIOLOGY

## 2021-12-13 PROCEDURE — 99214 OFFICE O/P EST MOD 30 MIN: CPT | Performed by: RADIOLOGY

## 2021-12-13 ASSESSMENT — FIBROSIS 4 INDEX: FIB4 SCORE: 7.09

## 2021-12-13 ASSESSMENT — PAIN SCALES - GENERAL: PAINLEVEL: NO PAIN

## 2021-12-13 NOTE — NON-PROVIDER
"Patient was seen today in clinic with Dr. Richey for consult.  Vitals signs and weight were obtained and pain assessment was completed.  Allergies and medications were reviewed with the patient.      Vitals/Pain:  Vitals:    12/13/21 1407   BP: 125/82   Pulse: 65   Resp: (!) 22   Temp: 36.7 °C (98.1 °F)   SpO2: 98%   Weight: 63 kg (139 lb)   Height: 1.676 m (5' 6\")   PF: (!) 1 L/min   Pain Score: No pain        Allergies:   Pcn [penicillins] and Other environmental    Current Medications:  Current Outpatient Medications   Medication Sig Dispense Refill   • atorvastatin (LIPITOR) 80 MG tablet TAKE 1 TABLET BY MOUTH EVERY DAY (Patient taking differently: Take 80 mg by mouth every evening.) 90 Tablet 3   • traZODone (DESYREL) 150 MG Tab TAKE 1 TABLET BY MOUTH TWICE DAILY (Patient taking differently: Take 100 mg by mouth at bedtime.) 180 Tablet 3   • escitalopram (LEXAPRO) 20 MG tablet Take 1 Tablet by mouth every day. 30 Tablet 11   • levalbuterol (XOPENEX HFA) 45 MCG/ACT inhaler Inhale 1-2 Puffs every four hours as needed for Shortness of Breath. 15 g 0   • ipratropium-albuterol (DUONEB) 0.5-2.5 (3) MG/3ML nebulizer solution Take 3 mL by nebulization every four hours as needed for Shortness of Breath. 30 Each 0   • spironolactone (ALDACTONE) 50 MG Tab Take 50 mg by mouth every morning.     • Multiple Vitamins-Minerals (DAILY MULTIVITAMIN PO) Take 1 Tablet by mouth every morning.     • losartan (COZAAR) 100 MG Tab Take 0.5 Tablets by mouth every day. 90 tablet 1   • levothyroxine (SYNTHROID) 88 MCG Tab Take 1 tablet by mouth every morning on an empty stomach. 90 tablet 3     No current facility-administered medications for this encounter.         PCP:  Kehinde Cox R.N.  "

## 2021-12-13 NOTE — PROCEDURES
DATE OF SERVICE:  12/06/2021     The results of this test meet the ATS standards for acceptability and   repeatability.     SPIROMETRY:  There is mild obstruction manifested in this spirometry by an   FEV1/FVC of 65.66%.  Noted the FEV1 was 1.65 (79% of predicted).     Bronchodilator testing was not done or not reported in this test.     LUNG VOLUMES:  The Lung volumes show air trapping manifested by an RV of 142%   (3.45). TLC is at normal limits with 112% of predicted (5.96).     There is moderate impairment in the diffusion capacity, which corrects to VA.    Initial DLCO was 54%, corrected for VA was 205%.     Flow volume correlates with the information above.        ______________________________  Raman Mendez MD    FJF/JAN    DD:  12/12/2021 18:53  DT:  12/12/2021 19:03    Job#:  [id number]

## 2021-12-13 NOTE — CT SIMULATION
PATIENT NAME Evie Morillo   PRIMARY PHYSICIAN Cindy Busby [id number]   REFERRING PHYSICIAN Kyung Lee A.P.N. 1945     Primary cancer of right upper lobe of lung (HCC)  Staging form: Lung, AJCC 8th Edition  - Clinical: Stage IB (cT2a, cN0, cM0) - Signed by Callum Richey M.D. on 12/6/2021  Histologic grade (G): G1  Histologic grading system: 4 grade system         Treatment Planning CT Simulation      Order Questions     Question Answer    Is this for a new course of treatment? Yes    Is this an Addendum? No    Implanted Device/Pacemaker No    Simulation Status Initial    Treatment Site Lung    Laterality Right    Treatment Technique SBRT    Other Technique(s) SBRT    Treatment Pattern/Frequency Q OD    Concurrent Chemotherapy No    CT Technique 4D    Slice Thickness 2mm    Scan Extent Chest    Treatment Device(s) Body Fix     OmniBoard    Patient Attire Gown    Patient Position Supine    Patient Orientation Head First    Arm Position Up    Treatment Machine TB1 - STx    Treatment Image Guidance CBCT    Frequency (CBCT) Daily    Image Guidance Match Bone    Treatment Planning Image Fusion CT/PET    Special Physics Consult Stereotactic    Other Orders Special Tx Procedure     Weekly Physics Check

## 2021-12-13 NOTE — ADDENDUM NOTE
Encounter addended by: Rochelle Cox R.N. on: 12/13/2021 3:28 PM   Actions taken: Clinical Note Signed

## 2021-12-13 NOTE — TELEPHONE ENCOUNTER
ANTHONY    PT called because she has early stage lung cancer and is going in for a radiation consultation today. She wants to make sure there will be communication between Dr. CRUZ and Dr. Richey.     Best Contact Number: [phone]    Thank you,    Melissa SOLITARIO

## 2021-12-14 ENCOUNTER — HOSPITAL ENCOUNTER (OUTPATIENT)
Dept: RADIATION ONCOLOGY | Facility: MEDICAL CENTER | Age: 76
End: 2021-12-14
Payer: MEDICARE

## 2021-12-14 PROCEDURE — 77263 THER RADIOLOGY TX PLNG CPLX: CPT | Performed by: RADIOLOGY

## 2021-12-14 PROCEDURE — 77334 RADIATION TREATMENT AID(S): CPT | Mod: 26 | Performed by: RADIOLOGY

## 2021-12-14 PROCEDURE — 77290 THER RAD SIMULAJ FIELD CPLX: CPT | Performed by: RADIOLOGY

## 2021-12-14 PROCEDURE — 77334 RADIATION TREATMENT AID(S): CPT | Performed by: RADIOLOGY

## 2021-12-14 PROCEDURE — 77470 SPECIAL RADIATION TREATMENT: CPT | Mod: 26 | Performed by: RADIOLOGY

## 2021-12-14 PROCEDURE — 77470 SPECIAL RADIATION TREATMENT: CPT | Performed by: RADIOLOGY

## 2021-12-14 PROCEDURE — 77290 THER RAD SIMULAJ FIELD CPLX: CPT | Mod: 26 | Performed by: RADIOLOGY

## 2021-12-14 NOTE — TELEPHONE ENCOUNTER
"Noted Dr. Callum Richey, radiation oncology last OV notes in pt's chart from yesterday. Called pt and she said, \"I decided to go with radiation. He explained to me that there shouldn't be any problems with my heart, surgery would probably be more of a risk.\" She said she also has an appointment again today to do a \"CP\" test at Dr. Richey's office to see where radiation will be done. Advised will notify TW regarding this and she verbalized understanding, appreciative of call back.  "

## 2021-12-15 ENCOUNTER — PATIENT OUTREACH (OUTPATIENT)
Dept: OTHER | Facility: MEDICAL CENTER | Age: 76
End: 2021-12-15

## 2021-12-15 PROCEDURE — 77370 RADIATION PHYSICS CONSULT: CPT | Performed by: RADIOLOGY

## 2021-12-15 NOTE — PROGRESS NOTES
Call placed to patient for follow up.  She felt appointments with radiation went well.  She is a little stressed about how she will respond to treatment.  She knows the information, but states she really won't know how she specifically will respond until going through it.  Pt verbalized stressful home life and feels does not get any help and has to do everything herself.  Pt Discussed has a sister in [place] and granddaughter that are her support.  Pt declined need for support group or counseling.  She states she just keeps herself busy.  No needs from navigator at this time.  Contact information provided.

## 2021-12-15 NOTE — TELEPHONE ENCOUNTER
Andre Pulido M.D.  You 15 minutes ago (9:41 AM)     I am very sorry for her diagnosis, hopefully it will go very well.  No cardiac issues to what she is planning to undergo.      [employer] message sent to pt.

## 2021-12-16 PROCEDURE — 77293 RESPIRATOR MOTION MGMT SIMUL: CPT | Performed by: RADIOLOGY

## 2021-12-16 PROCEDURE — 77295 3-D RADIOTHERAPY PLAN: CPT | Mod: 26 | Performed by: RADIOLOGY

## 2021-12-16 PROCEDURE — 77293 RESPIRATOR MOTION MGMT SIMUL: CPT | Mod: 26 | Performed by: RADIOLOGY

## 2021-12-16 PROCEDURE — 77300 RADIATION THERAPY DOSE PLAN: CPT | Performed by: RADIOLOGY

## 2021-12-16 PROCEDURE — 77334 RADIATION TREATMENT AID(S): CPT | Performed by: RADIOLOGY

## 2021-12-16 PROCEDURE — 77334 RADIATION TREATMENT AID(S): CPT | Mod: 26 | Performed by: RADIOLOGY

## 2021-12-16 PROCEDURE — 77295 3-D RADIOTHERAPY PLAN: CPT | Performed by: RADIOLOGY

## 2021-12-16 PROCEDURE — 77300 RADIATION THERAPY DOSE PLAN: CPT | Mod: 26 | Performed by: RADIOLOGY

## 2021-12-21 ENCOUNTER — HOSPITAL ENCOUNTER (OUTPATIENT)
Dept: RADIATION ONCOLOGY | Facility: MEDICAL CENTER | Age: 76
End: 2021-12-21
Payer: MEDICARE

## 2021-12-21 ENCOUNTER — PATIENT OUTREACH (OUTPATIENT)
Dept: OTHER | Facility: MEDICAL CENTER | Age: 76
End: 2021-12-21

## 2021-12-21 LAB
CHEMOTHERAPY INFUSION START DATE: NORMAL
CHEMOTHERAPY RECORDS: 12
CHEMOTHERAPY RECORDS: 6000
CHEMOTHERAPY RECORDS: NORMAL
CHEMOTHERAPY RX CANCER: NORMAL
DATE 1ST CHEMO CANCER: NORMAL
RAD ONC ARIA COURSE LAST TREATMENT DATE: NORMAL
RAD ONC ARIA COURSE TREATMENT ELAPSED DAYS: NORMAL
RAD ONC ARIA REFERENCE POINT DOSAGE GIVEN TO DATE: 12
RAD ONC ARIA REFERENCE POINT DOSAGE GIVEN TO DATE: 12.39
RAD ONC ARIA REFERENCE POINT ID: NORMAL
RAD ONC ARIA REFERENCE POINT ID: NORMAL
RAD ONC ARIA REFERENCE POINT SESSION DOSAGE GIVEN: 12
RAD ONC ARIA REFERENCE POINT SESSION DOSAGE GIVEN: 12.39

## 2021-12-21 PROCEDURE — 77280 THER RAD SIMULAJ FIELD SMPL: CPT | Mod: 26 | Performed by: RADIOLOGY

## 2021-12-21 PROCEDURE — 77373 STRTCTC BDY RAD THER TX DLVR: CPT | Performed by: RADIOLOGY

## 2021-12-21 PROCEDURE — 77280 THER RAD SIMULAJ FIELD SMPL: CPT | Performed by: RADIOLOGY

## 2021-12-21 PROCEDURE — 77435 SBRT MANAGEMENT: CPT | Performed by: RADIOLOGY

## 2021-12-21 NOTE — PROGRESS NOTES
On December 21, 2021, Oncology Social Worker Niki Dumont attempted telephone contact with pt.  OSW Tim left voicemail message for pt. requesting pt. call back at earliest convenience.  OSW Tim left contact information in voicemail message.

## 2021-12-23 ENCOUNTER — HOSPITAL ENCOUNTER (OUTPATIENT)
Dept: RADIATION ONCOLOGY | Facility: MEDICAL CENTER | Age: 76
End: 2021-12-23
Payer: MEDICARE

## 2021-12-23 LAB
CHEMOTHERAPY INFUSION START DATE: NORMAL
CHEMOTHERAPY RECORDS: 12
CHEMOTHERAPY RECORDS: 6000
CHEMOTHERAPY RECORDS: NORMAL
CHEMOTHERAPY RX CANCER: NORMAL
DATE 1ST CHEMO CANCER: NORMAL
RAD ONC ARIA COURSE LAST TREATMENT DATE: NORMAL
RAD ONC ARIA COURSE TREATMENT ELAPSED DAYS: NORMAL
RAD ONC ARIA REFERENCE POINT DOSAGE GIVEN TO DATE: 24
RAD ONC ARIA REFERENCE POINT DOSAGE GIVEN TO DATE: 24.78
RAD ONC ARIA REFERENCE POINT ID: NORMAL
RAD ONC ARIA REFERENCE POINT ID: NORMAL
RAD ONC ARIA REFERENCE POINT SESSION DOSAGE GIVEN: 12
RAD ONC ARIA REFERENCE POINT SESSION DOSAGE GIVEN: 12.39

## 2021-12-23 PROCEDURE — 77280 THER RAD SIMULAJ FIELD SMPL: CPT | Performed by: RADIOLOGY

## 2021-12-23 PROCEDURE — 77373 STRTCTC BDY RAD THER TX DLVR: CPT | Performed by: RADIOLOGY

## 2021-12-23 PROCEDURE — 77280 THER RAD SIMULAJ FIELD SMPL: CPT | Mod: 26 | Performed by: RADIOLOGY

## 2021-12-27 ENCOUNTER — HOSPITAL ENCOUNTER (OUTPATIENT)
Dept: RADIATION ONCOLOGY | Facility: MEDICAL CENTER | Age: 76
End: 2021-12-27
Payer: MEDICARE

## 2021-12-27 LAB
CHEMOTHERAPY INFUSION START DATE: NORMAL
CHEMOTHERAPY RECORDS: 12
CHEMOTHERAPY RECORDS: 6000
CHEMOTHERAPY RECORDS: NORMAL
CHEMOTHERAPY RX CANCER: NORMAL
DATE 1ST CHEMO CANCER: NORMAL
RAD ONC ARIA COURSE LAST TREATMENT DATE: NORMAL
RAD ONC ARIA COURSE TREATMENT ELAPSED DAYS: NORMAL
RAD ONC ARIA REFERENCE POINT DOSAGE GIVEN TO DATE: 36
RAD ONC ARIA REFERENCE POINT DOSAGE GIVEN TO DATE: 37.16
RAD ONC ARIA REFERENCE POINT ID: NORMAL
RAD ONC ARIA REFERENCE POINT ID: NORMAL
RAD ONC ARIA REFERENCE POINT SESSION DOSAGE GIVEN: 12
RAD ONC ARIA REFERENCE POINT SESSION DOSAGE GIVEN: 12.39

## 2021-12-27 PROCEDURE — 77373 STRTCTC BDY RAD THER TX DLVR: CPT | Performed by: RADIOLOGY

## 2021-12-27 PROCEDURE — 77336 RADIATION PHYSICS CONSULT: CPT | Mod: XU | Performed by: RADIOLOGY

## 2021-12-27 PROCEDURE — 77280 THER RAD SIMULAJ FIELD SMPL: CPT | Performed by: RADIOLOGY

## 2021-12-27 PROCEDURE — 77280 THER RAD SIMULAJ FIELD SMPL: CPT | Mod: 26 | Performed by: RADIOLOGY

## 2021-12-28 ENCOUNTER — PATIENT OUTREACH (OUTPATIENT)
Dept: OTHER | Facility: MEDICAL CENTER | Age: 76
End: 2021-12-28

## 2021-12-29 ENCOUNTER — HOSPITAL ENCOUNTER (OUTPATIENT)
Dept: RADIATION ONCOLOGY | Facility: MEDICAL CENTER | Age: 76
End: 2021-12-29
Payer: MEDICARE

## 2021-12-29 LAB
CHEMOTHERAPY INFUSION START DATE: NORMAL
CHEMOTHERAPY RECORDS: 12
CHEMOTHERAPY RECORDS: 6000
CHEMOTHERAPY RECORDS: NORMAL
CHEMOTHERAPY RX CANCER: NORMAL
DATE 1ST CHEMO CANCER: NORMAL
RAD ONC ARIA COURSE LAST TREATMENT DATE: NORMAL
RAD ONC ARIA COURSE TREATMENT ELAPSED DAYS: NORMAL
RAD ONC ARIA REFERENCE POINT DOSAGE GIVEN TO DATE: 48
RAD ONC ARIA REFERENCE POINT DOSAGE GIVEN TO DATE: 49.55
RAD ONC ARIA REFERENCE POINT ID: NORMAL
RAD ONC ARIA REFERENCE POINT ID: NORMAL
RAD ONC ARIA REFERENCE POINT SESSION DOSAGE GIVEN: 12
RAD ONC ARIA REFERENCE POINT SESSION DOSAGE GIVEN: 12.39

## 2021-12-29 PROCEDURE — 77280 THER RAD SIMULAJ FIELD SMPL: CPT | Performed by: RADIOLOGY

## 2021-12-29 PROCEDURE — 77373 STRTCTC BDY RAD THER TX DLVR: CPT | Performed by: RADIOLOGY

## 2021-12-29 PROCEDURE — 77280 THER RAD SIMULAJ FIELD SMPL: CPT | Mod: 26 | Performed by: RADIOLOGY

## 2021-12-29 NOTE — PROGRESS NOTES
"On December 28, 2021, Oncology Social Worker Niki Dumont contacted pt. via telephone.  JUAN DAVID Dumont thanked pt. for calling her back and leaving a voicemail message.  Pt. shared she couldn't remember what she said in message.  JUAN DAVID Dumont explained her role to pt.  Pt. shared she was \"trying to do all of my chores and cooking.\"  Pt. shared she has a  but stated he works nights.  Pt. shared \"I'm just tired.\"  Pt. asked JUAN DAVID Dumont if this was normal.  JUAN DAVID Dumont asked for pt. to let her doctor know tomorrow when she sees him.  JUAN DAVID Dumont explained to pt. she will meet with her radiation oncologist on Wednesdays for doctor day.  Pt. agreed to let her radiation oncologist know tomorrow that she's tired.  No other concerns brought forward by pt. at this time.    "

## 2021-12-30 ENCOUNTER — HOSPITAL ENCOUNTER (OUTPATIENT)
Dept: RADIATION ONCOLOGY | Facility: MEDICAL CENTER | Age: 76
End: 2021-12-30
Payer: MEDICARE

## 2021-12-30 LAB
CHEMOTHERAPY INFUSION START DATE: NORMAL
CHEMOTHERAPY INFUSION START DATE: NORMAL
CHEMOTHERAPY INFUSION STOP DATE: NORMAL
CHEMOTHERAPY RECORDS: 12
CHEMOTHERAPY RECORDS: 12
CHEMOTHERAPY RECORDS: 6000
CHEMOTHERAPY RECORDS: 6000
CHEMOTHERAPY RECORDS: NORMAL
CHEMOTHERAPY RX CANCER: NORMAL
CHEMOTHERAPY RX CANCER: NORMAL
DATE 1ST CHEMO CANCER: NORMAL
DATE 1ST CHEMO CANCER: NORMAL
RAD ONC ARIA COURSE LAST TREATMENT DATE: NORMAL
RAD ONC ARIA COURSE LAST TREATMENT DATE: NORMAL
RAD ONC ARIA COURSE TREATMENT ELAPSED DAYS: NORMAL
RAD ONC ARIA COURSE TREATMENT ELAPSED DAYS: NORMAL
RAD ONC ARIA REFERENCE POINT DOSAGE GIVEN TO DATE: 60
RAD ONC ARIA REFERENCE POINT DOSAGE GIVEN TO DATE: 60
RAD ONC ARIA REFERENCE POINT DOSAGE GIVEN TO DATE: 61.94
RAD ONC ARIA REFERENCE POINT DOSAGE GIVEN TO DATE: 61.94
RAD ONC ARIA REFERENCE POINT ID: NORMAL
RAD ONC ARIA REFERENCE POINT SESSION DOSAGE GIVEN: 12
RAD ONC ARIA REFERENCE POINT SESSION DOSAGE GIVEN: 12.39

## 2021-12-30 PROCEDURE — 77280 THER RAD SIMULAJ FIELD SMPL: CPT | Mod: 26 | Performed by: RADIOLOGY

## 2021-12-30 PROCEDURE — 77373 STRTCTC BDY RAD THER TX DLVR: CPT | Performed by: RADIOLOGY

## 2021-12-30 PROCEDURE — 77280 THER RAD SIMULAJ FIELD SMPL: CPT | Performed by: RADIOLOGY

## 2022-02-08 ENCOUNTER — HOSPITAL ENCOUNTER (OUTPATIENT)
Dept: RADIATION ONCOLOGY | Facility: MEDICAL CENTER | Age: 77
End: 2022-02-28
Attending: RADIOLOGY
Payer: MEDICARE

## 2022-02-08 DIAGNOSIS — C34.11 PRIMARY CANCER OF RIGHT UPPER LOBE OF LUNG (HCC): ICD-10-CM

## 2022-02-08 DIAGNOSIS — C34.90 MALIGNANT NEOPLASM OF UNSPECIFIED PART OF UNSPECIFIED BRONCHUS OR LUNG (HCC): ICD-10-CM

## 2022-02-08 RX ORDER — ALBUTEROL SULFATE 90 UG/1
2 AEROSOL, METERED RESPIRATORY (INHALATION) EVERY 4 HOURS PRN
COMMUNITY

## 2022-02-08 NOTE — PROGRESS NOTES
Telephone Appointment Visit   As a means of avoiding spread of COVID-19, this visit is being conducted by telephone. This telephone visit was initiated by the patient and they verbally consented.    Time at start of call: 12:30    Reason for Call:  Symptom Follow-up    HPI:    Stage IB ([id number]) non-small cell lung carcinoma, adenocarcinoma histology, lipidic pattern, well differentiated of the right upper lobe of lung, medically inoperable, definitive SBRT to 6000 cGy in December 2021    Patient states immediately after completing the radiation she did have slight irritation in the lung that she felt might be related to the radiation.  This is since resolved.  Any cough or shortness of breath has seemed stable to her before treatment levels.  No untoward effects that we would attribute to the radiation.       Labs / Images Reviewed:   none    Assessment and Plan:     I will plan on her first posttreatment reimaging in 6 weeks.  I will plan on seeing her back after that imaging.    Follow-up: 6 weeks after CT scan chest    Time at end of call: 12:45  Total Time Spent: 11-20 minutes    Callum Richey M.D.

## 2022-02-09 ENCOUNTER — PHARMACY VISIT (OUTPATIENT)
Dept: PHARMACY | Facility: MEDICAL CENTER | Age: 77
End: 2022-02-09
Payer: COMMERCIAL

## 2022-02-09 ENCOUNTER — OFFICE VISIT (OUTPATIENT)
Dept: SLEEP MEDICINE | Facility: MEDICAL CENTER | Age: 77
End: 2022-02-09
Payer: MEDICARE

## 2022-02-09 VITALS
SYSTOLIC BLOOD PRESSURE: 120 MMHG | BODY MASS INDEX: 23.14 KG/M2 | HEART RATE: 84 BPM | HEIGHT: 66 IN | RESPIRATION RATE: 16 BRPM | TEMPERATURE: 97.7 F | WEIGHT: 144 LBS | OXYGEN SATURATION: 93 % | DIASTOLIC BLOOD PRESSURE: 54 MMHG

## 2022-02-09 DIAGNOSIS — J96.11 CHRONIC RESPIRATORY FAILURE WITH HYPOXIA (HCC): ICD-10-CM

## 2022-02-09 DIAGNOSIS — C34.11 PRIMARY CANCER OF RIGHT UPPER LOBE OF LUNG (HCC): ICD-10-CM

## 2022-02-09 DIAGNOSIS — J44.9 CHRONIC OBSTRUCTIVE PULMONARY DISEASE, UNSPECIFIED COPD TYPE (HCC): ICD-10-CM

## 2022-02-09 DIAGNOSIS — Z87.891 FORMER SMOKER: ICD-10-CM

## 2022-02-09 PROCEDURE — RXMED WILLOW AMBULATORY MEDICATION CHARGE: Performed by: INTERNAL MEDICINE

## 2022-02-09 PROCEDURE — 99204 OFFICE O/P NEW MOD 45 MIN: CPT | Performed by: INTERNAL MEDICINE

## 2022-02-09 RX ORDER — SPIRONOLACTONE 50 MG/1
TABLET, FILM COATED ORAL
Qty: 100 TABLET | Refills: 2 | Status: SHIPPED | OUTPATIENT
Start: 2022-02-09 | End: 2023-05-08 | Stop reason: SDUPTHER

## 2022-02-09 RX ORDER — RNA INGREDIENT BNT-162B2 0.23 G/1.8ML
INJECTION, SUSPENSION INTRAMUSCULAR
Qty: 0.3 ML | Refills: 0 | Status: SHIPPED | OUTPATIENT
Start: 2022-02-09 | End: 2022-03-17

## 2022-02-09 ASSESSMENT — ENCOUNTER SYMPTOMS
DIARRHEA: 0
MYALGIAS: 0
WEIGHT LOSS: 0
BLURRED VISION: 0
FALLS: 0
NAUSEA: 0
SPEECH CHANGE: 0
COUGH: 0
EYE DISCHARGE: 0
EYE REDNESS: 0
DOUBLE VISION: 0
SINUS PAIN: 0
PHOTOPHOBIA: 0
TREMORS: 0
CHILLS: 0
DIAPHORESIS: 0
HEMOPTYSIS: 0
FOCAL WEAKNESS: 0
SHORTNESS OF BREATH: 0
EYE PAIN: 0
CLAUDICATION: 0
STRIDOR: 0
ABDOMINAL PAIN: 0
SORE THROAT: 0
WHEEZING: 1
FEVER: 0
CONSTIPATION: 0
PND: 0
NECK PAIN: 0
DEPRESSION: 0
SPUTUM PRODUCTION: 0
HEARTBURN: 0
HEADACHES: 0
ORTHOPNEA: 0
BACK PAIN: 1
VOMITING: 0
PALPITATIONS: 0
WEAKNESS: 0
DIZZINESS: 0

## 2022-02-09 ASSESSMENT — FIBROSIS 4 INDEX: FIB4 SCORE: 7.09

## 2022-02-09 NOTE — PROGRESS NOTES
Chief Complaint   Patient presents with   • Establish Care     REFERRAL 8/14/21 SANIA Pinzon DO DX COPD    • Results     HOS 8/13/21-8/16/21, PET 11/29/21, PFT 12/6/21, Oncology 12/13/21        HPI: This patient is a 76 y.o. female presenting for evaluation of COPD with associated hypoxic respiratory failure.  The patient's past medical history is significant for hypothyroid, hypertension and dyslipidemia all currently medicated, coronary artery disease status post percutaneous coronary artery intervention, abdominal aortic aneurysm repair and more recently adenocarcinoma of the right upper lobe status post radiation therapy currently being followed by Dr. Richey.  The patient is a former tobacco smoker with greater than 35-pack-year history and quit in 2019.  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  Family history is notable for mother who had uterine cancer in her grandmother who had stomach cancer.  The patient did not have a diagnosis of COPD prior to her diagnosis of lung cancer last fall.  Pulmonary function testing from December showed airflow obstruction that was mild with FEV1/FVC ratio of 65 and FEV1 of 79% predicted.  Normal total lung capacity with mild air trapping but DLCO was reduced.  She was hospitalized in August with pneumonia and discharged on supplemental oxygen.  Lung cancer was discovered when pneumonia did not resolve and she subsequently had biopsy of right upper lobe.  She tells me she has been monitoring her oxygen at home and does not get numbers below 89% even while vacuuming without oxygen.  She does sleep with oxygen at 1.5 L.  She denies chronic cough.  She has occasional wheezing.  She can walk 2-3 city blocks before becoming short of breath.  She does have rescue bronchodilators but does not use these with any  "regularity and most recently attempted use made her vomit.  Her last CT chest prior to diagnosis of lung cancer in September of last year irregular masslike opacity in the right upper lobe measuring maximum diameter 3.2 cm in addition to emphysematous changes.    Past Medical History:   Diagnosis Date   • AAA (abdominal aortic aneurysm) ([facility])     had surgery for this.   • Arthritis     fingers   • Bladder infection 10/15/2021    antibiotic course done   • Bowel habit changes     diarrhea   • Bronchitis    • CAD (coronary artery disease) 10/4/2012    2 stents [place] 2009    • Cataract     right eye   • Chronic insomnia 5/23/2016   • COPD (chronic obstructive pulmonary disease) ([facility])     \"I think\"   • Dental disorder     upper denture   • Depression 9/26/2013    not a current issue-not on medications   • Diverticula of colon    • Environmental and seasonal allergies    • High cholesterol    • Hyperlipidemia 10/4/2012   • Hypertension    • Hypothyroid 10/17/2013   • Lung nodule     Right   • Lupus ([facility])    • Nonrheumatic aortic valve insufficiency     Dr. Pulido   • Oxygen dependent     1 L O2 NC   • Pneumonia    • Postural hypotension 10/7/2014     IMO load March 2020   • Sleep apnea     (+) ADRIANNA , not using CPAP   • TIA (transient ischemic attack) 10/04/2012    2001 (x2) and then one in 2003   • Tobacco abuse 4/20/2016       Social History     Socioeconomic History   • Marital status:      Spouse name: Not on file   • Number of children: Not on file   • Years of education: Not on file   • Highest education level: Not on file   Occupational History   • Not on file   Tobacco Use   • Smoking status: Former Smoker     Packs/day: 0.50     Years: 40.00     Pack years: 20.00     Types: Cigarettes     Quit date: 2/10/2019     Years since quitting: 3.0   • Smokeless tobacco: Never Used   Vaping Use   • Vaping Use: Never used   Substance and Sexual Activity   • Alcohol use: Not Currently   • Drug use: Not " Currently   • Sexual activity: Not Currently     Partners: Male   Other Topics Concern   • Not on file   Social History Narrative    On disability      Social Determinants of Health     Financial Resource Strain:    • Difficulty of Paying Living Expenses: Not on file   Food Insecurity:    • Worried About Running Out of Food in the Last Year: Not on file   • Ran Out of Food in the Last Year: Not on file   Transportation Needs:    • Lack of Transportation (Medical): Not on file   • Lack of Transportation (Non-Medical): Not on file   Physical Activity:    • Days of Exercise per Week: Not on file   • Minutes of Exercise per Session: Not on file   Stress:    • Feeling of Stress : Not on file   Social Connections:    • Frequency of Communication with Friends and Family: Not on file   • Frequency of Social Gatherings with Friends and Family: Not on file   • Attends Evangelical Services: Not on file   • Active Member of Clubs or Organizations: Not on file   • Attends Club or Organization Meetings: Not on file   • Marital Status: Not on file   Intimate Partner Violence:    • Fear of Current or Ex-Partner: Not on file   • Emotionally Abused: Not on file   • Physically Abused: Not on file   • Sexually Abused: Not on file   Housing Stability:    • Unable to Pay for Housing in the Last Year: Not on file   • Number of Places Lived in the Last Year: Not on file   • Unstable Housing in the Last Year: Not on file       Family History   Problem Relation Age of Onset   • Heart Disease Mother    • Heart Attack Mother    • Heart Disease Father    • Heart Attack Father    • Cancer Maternal Grandmother         stomach   • Diabetes Maternal Grandmother    • Cancer Maternal Grandfather         brain   • Stroke Paternal Grandmother    • Cancer Other        Current Outpatient Medications on File Prior to Visit   Medication Sig Dispense Refill   • Multiple Vitamins-Minerals (ALIVE MULTI-VITAMIN PO) Take  by mouth. FRUIT AND VEGGIES     •  atorvastatin (LIPITOR) 80 MG tablet TAKE 1 TABLET BY MOUTH EVERY DAY (Patient taking differently: Take 80 mg by mouth every evening.) 90 Tablet 3   • traZODone (DESYREL) 150 MG Tab TAKE 1 TABLET BY MOUTH TWICE DAILY (Patient taking differently: Take 100 mg by mouth at bedtime.) 180 Tablet 3   • escitalopram (LEXAPRO) 20 MG tablet Take 1 Tablet by mouth every day. 30 Tablet 11   • ipratropium-albuterol (DUONEB) 0.5-2.5 (3) MG/3ML nebulizer solution Take 3 mL by nebulization every four hours as needed for Shortness of Breath. 30 Each 0   • spironolactone (ALDACTONE) 50 MG Tab Take 50 mg by mouth every morning.     • Multiple Vitamins-Minerals (DAILY MULTIVITAMIN PO) Take 1 Tablet by mouth every morning.     • losartan (COZAAR) 100 MG Tab Take 0.5 Tablets by mouth every day. 90 tablet 1   • levothyroxine (SYNTHROID) 88 MCG Tab Take 1 tablet by mouth every morning on an empty stomach. 90 tablet 3   • albuterol 108 (90 Base) MCG/ACT Aero Soln inhalation aerosol albuterol sulfate HFA 90 mcg/actuation aerosol inhaler   INHALE 2 PUFFS BY MOUTH EVERY 6 HOURS AS NEEDED FOR SHORTNESS OF BREATH     • levalbuterol (XOPENEX HFA) 45 MCG/ACT inhaler Inhale 1-2 Puffs every four hours as needed for Shortness of Breath. 15 g 0     No current facility-administered medications on file prior to visit.       Allergies: Pcn [penicillins] and Other environmental    ROS:   Review of Systems   Constitutional: Negative for chills, diaphoresis, fever, malaise/fatigue and weight loss.   HENT: Negative for congestion, ear discharge, ear pain, hearing loss, nosebleeds, sinus pain, sore throat and tinnitus.    Eyes: Negative for blurred vision, double vision, photophobia, pain, discharge and redness.   Respiratory: Positive for wheezing. Negative for cough, hemoptysis, sputum production, shortness of breath and stridor.    Cardiovascular: Negative for chest pain, palpitations, orthopnea, claudication, leg swelling and PND.   Gastrointestinal:  "Negative for abdominal pain, constipation, diarrhea, heartburn, nausea and vomiting.   Genitourinary: Negative for dysuria and urgency.   Musculoskeletal: Positive for back pain. Negative for falls, joint pain, myalgias and neck pain.   Skin: Negative for itching and rash.   Neurological: Negative for dizziness, tremors, speech change, focal weakness, weakness and headaches.   Endo/Heme/Allergies: Negative for environmental allergies.   Psychiatric/Behavioral: Negative for depression.       /54 (BP Location: Right arm, Patient Position: Sitting, BP Cuff Size: Adult)   Pulse 84   Temp 36.5 °C (97.7 °F) (Temporal)   Resp 16   Ht 1.676 m (5' 6\")   Wt 65.3 kg (144 lb)   SpO2 93%     Physical Exam:  Physical Exam  Vitals reviewed.   Constitutional:       General: She is not in acute distress.     Appearance: Normal appearance. She is well-developed and normal weight.   HENT:      Head: Normocephalic and atraumatic.      Right Ear: External ear normal.      Left Ear: External ear normal.      Nose: Nose normal. No congestion.      Mouth/Throat:      Mouth: Mucous membranes are moist.      Pharynx: Oropharynx is clear. No oropharyngeal exudate.   Eyes:      General: No scleral icterus.     Extraocular Movements: Extraocular movements intact.      Conjunctiva/sclera: Conjunctivae normal.      Pupils: Pupils are equal, round, and reactive to light.   Neck:      Vascular: No JVD.      Trachea: No tracheal deviation.   Cardiovascular:      Rate and Rhythm: Normal rate and regular rhythm.      Heart sounds: Normal heart sounds. No murmur heard.  No friction rub. No gallop.    Pulmonary:      Effort: Pulmonary effort is normal. No accessory muscle usage or respiratory distress.      Breath sounds: Normal breath sounds. No wheezing or rales.   Abdominal:      General: There is no distension.      Palpations: Abdomen is soft.      Tenderness: There is no abdominal tenderness.   Musculoskeletal:         General: No " tenderness or deformity. Normal range of motion.      Cervical back: Normal range of motion and neck supple.      Right lower leg: No edema.      Left lower leg: No edema.   Lymphadenopathy:      Cervical: No cervical adenopathy.   Skin:     General: Skin is warm and dry.      Findings: No rash.      Nails: There is no clubbing.   Neurological:      Mental Status: She is alert and oriented to person, place, and time.      Cranial Nerves: No cranial nerve deficit.      Gait: Gait normal.   Psychiatric:         Mood and Affect: Mood normal.         Behavior: Behavior normal.         PFTs as reviewed by me personally: As per HPI    Imaging as reviewed by me personally: As per HPI    Assessment:  1. Chronic obstructive pulmonary disease, unspecified COPD type (HCC)     2. Chronic respiratory failure with hypoxia (HCC)  Overnight Oximetry    Exercise Test for Bronchospasm / 6-Minute Walk   3. Primary cancer of right upper lobe of lung (HCC)     4. Former smoker         Plan:  1.  This is a fairly new formal diagnosis for the patient.  No history of exacerbations or daily symptoms that warrant controller therapy at this point.  We will continue short acting bronchodilators as needed.  She is tobacco free and up-to-date on vaccines.  2.  This was acute in the setting of pneumonia followed by diagnosis of lung cancer.  We will reassess her oxygen needs with 6-minute walk test on room air and overnight oximetry on room air.  3.  Status post radiation therapy and followed with surveillance imaging.  4.  Tobacco free.  Encouraged ongoing abstinence.  Return in about 4 months (around 6/9/2022) for 6 mwt, opo.

## 2022-02-10 ENCOUNTER — TELEPHONE (OUTPATIENT)
Dept: HEMATOLOGY ONCOLOGY | Facility: MEDICAL CENTER | Age: 77
End: 2022-02-10

## 2022-02-10 NOTE — TELEPHONE ENCOUNTER
Patient called [facility] - and they do not have Kyung's referral.  Please re-send the referral.

## 2022-02-10 NOTE — TELEPHONE ENCOUNTER
"Called patient per Kyung:    \"Saw this patient was seen in hematology and I referred her to rheumatology.  She was scheduled to be seen by Dr. Diaz at the place located below on 11/17.  We were not going to see her again for hematology but then she came to me for IOC for a lung mass and I work that up and it up finding that she had cancer.  During all the work-up that was going on for the lung cancer was when she was scheduled to be seen by rheumatology and had canceled the appointment.  I have highly encouraged her to reschedule that appointment but could you check and see if that has been rescheduled.  If it has not can you call the patient and help her get this rescheduled?  I really want her to be seen by rheumatology as well.  Looks like she recently completed radiation therapy for the lung cancer so she should be healing from that. \"      Rheumatology       [facility]   [address] [place] [address]   [place] [address]   Phone: [phone]       Patient was not clear why we wanted her to F/U and I stated that [facility]ung referred her and felt that is was important that she follow up with them. She said okay and that she would call them to reschedule. I gave her their office number and location. Patient confirmed understanding and thanked me.         "

## 2022-02-24 ENCOUNTER — PATIENT MESSAGE (OUTPATIENT)
Dept: SLEEP MEDICINE | Facility: MEDICAL CENTER | Age: 77
End: 2022-02-24
Payer: MEDICARE

## 2022-02-24 DIAGNOSIS — J44.9 CHRONIC OBSTRUCTIVE PULMONARY DISEASE, UNSPECIFIED COPD TYPE (HCC): ICD-10-CM

## 2022-02-25 RX ORDER — AZITHROMYCIN 250 MG/1
TABLET, FILM COATED ORAL
Qty: 6 TABLET | Refills: 0 | Status: SHIPPED | OUTPATIENT
Start: 2022-02-25 | End: 2022-03-17

## 2022-02-25 RX ORDER — METHYLPREDNISOLONE 4 MG/1
TABLET ORAL
Qty: 21 TABLET | Refills: 0 | Status: SHIPPED | OUTPATIENT
Start: 2022-02-25 | End: 2022-04-20

## 2022-03-10 ENCOUNTER — HOSPITAL ENCOUNTER (OUTPATIENT)
Dept: LAB | Facility: MEDICAL CENTER | Age: 77
End: 2022-03-10
Attending: RADIOLOGY
Payer: MEDICARE

## 2022-03-10 DIAGNOSIS — C34.90 MALIGNANT NEOPLASM OF UNSPECIFIED PART OF UNSPECIFIED BRONCHUS OR LUNG (HCC): ICD-10-CM

## 2022-03-10 LAB
ANION GAP SERPL CALC-SCNC: 11 MMOL/L (ref 7–16)
BUN SERPL-MCNC: 31 MG/DL (ref 8–22)
CALCIUM SERPL-MCNC: 9.2 MG/DL (ref 8.5–10.5)
CHLORIDE SERPL-SCNC: 105 MMOL/L (ref 96–112)
CO2 SERPL-SCNC: 27 MMOL/L (ref 20–33)
CREAT SERPL-MCNC: 1.08 MG/DL (ref 0.5–1.4)
GLUCOSE SERPL-MCNC: 84 MG/DL (ref 65–99)
POTASSIUM SERPL-SCNC: 4.3 MMOL/L (ref 3.6–5.5)
SODIUM SERPL-SCNC: 143 MMOL/L (ref 135–145)

## 2022-03-10 PROCEDURE — 80048 BASIC METABOLIC PNL TOTAL CA: CPT

## 2022-03-10 PROCEDURE — 36415 COLL VENOUS BLD VENIPUNCTURE: CPT

## 2022-03-14 ENCOUNTER — HOSPITAL ENCOUNTER (OUTPATIENT)
Dept: RADIOLOGY | Facility: MEDICAL CENTER | Age: 77
End: 2022-03-14
Attending: RADIOLOGY
Payer: MEDICARE

## 2022-03-14 DIAGNOSIS — C34.90 MALIGNANT NEOPLASM OF UNSPECIFIED PART OF UNSPECIFIED BRONCHUS OR LUNG (HCC): ICD-10-CM

## 2022-03-14 PROCEDURE — 700117 HCHG RX CONTRAST REV CODE 255: Performed by: RADIOLOGY

## 2022-03-14 PROCEDURE — 71260 CT THORAX DX C+: CPT | Mod: ME

## 2022-03-14 RX ADMIN — IOHEXOL 75 ML: 350 INJECTION, SOLUTION INTRAVENOUS at 13:20

## 2022-03-17 ENCOUNTER — HOSPITAL ENCOUNTER (OUTPATIENT)
Dept: RADIATION ONCOLOGY | Facility: MEDICAL CENTER | Age: 77
End: 2022-03-31
Attending: RADIOLOGY
Payer: MEDICARE

## 2022-03-17 VITALS
OXYGEN SATURATION: 94 % | HEART RATE: 86 BPM | TEMPERATURE: 97.4 F | DIASTOLIC BLOOD PRESSURE: 69 MMHG | SYSTOLIC BLOOD PRESSURE: 104 MMHG

## 2022-03-17 DIAGNOSIS — C34.11 PRIMARY CANCER OF RIGHT UPPER LOBE OF LUNG (HCC): ICD-10-CM

## 2022-03-17 PROCEDURE — 99212 OFFICE O/P EST SF 10 MIN: CPT | Performed by: RADIOLOGY

## 2022-03-17 PROCEDURE — 99214 OFFICE O/P EST MOD 30 MIN: CPT | Performed by: RADIOLOGY

## 2022-03-17 ASSESSMENT — PAIN SCALES - GENERAL: PAINLEVEL: NO PAIN

## 2022-03-17 NOTE — PROGRESS NOTES
RADIATION ONCOLOGY FOLLOW UP    DATE OF SERVICE: 3/17/2022    IDENTIFICATION:   Stage IB ([id number]) non-small cell lung carcinoma, adenocarcinoma histology, lipidic pattern, well differentiated of the right upper lobe of lung, medically inoperable, definitive SBRT to 6000 cGy in December 2021       INTERVAL HISTORY: I had the pleasure of seeing Ms. Morillo today in follow up for her lung cancer.  From a symptom standpoint patient remains unchanged.  She had her first posttreatment CT scan of the chest.  This shows no evidence of active disease.  The area treated appears consistent with posttreatment effect.  All other findings are stable if not regressed.  Patient is considering moving back to [place] in July.    PHYSICAL EXAM:    Vitals:    03/17/22 1313   BP: 104/69   Pulse: 86   Temp: 36.3 °C (97.4 °F)   SpO2: 94%   Pain Score: No pain      1= Restricted in physically strenuous activity, but ambulatory and able to carry out work of a light sedentary nature, e.g., light housework, office work.      GENERAL: No apparent distress.  HEENT:  Pupils are equal, round, and reactive to light.  Extraocular muscles   are intact. Sclerae nonicteric.  Conjunctivae pink.  Oral cavity, tongue   protrudes midline.   NECK:  Supple without evidence of thyromegaly.  NODES:  No peripheral adenopathy of the neck, supraclavicular fossa or axillae   bilaterally.  LUNGS:  Clear to ascultation bilaterally   HEART:  Regular rate and rhythm.  No murmur appreciated  ABDOMEN:  Soft. No evidence of hepatosplenomegaly.  Positive bowel sounds.  EXTREMITIES:  Without Edema.  NEUROLOGIC:  Cranial nerves II through XII were intact. Normal stance and gait motor and sensory grossly within normal limits      IMPRESSION:   Stage IB ([id number]) non-small cell lung carcinoma, adenocarcinoma histology, lipidic pattern, well differentiated of the right upper lobe of lung, medically inoperable, definitive SBRT to 6000 cGy in December 2021      RECOMMENDATIONS:   I discussed with the patient her findings over a 25 minute time period, 95% of that time dedicated to an ongoing survivorship plan.  I will plan to continue with every 3-month CT scan of chest for the first year posttreatment.  If all results are normal after that first year then we will convert to every 6-month for the second year.  After the second year then annual thereafter.  I will plan on seeing her in June after her next CT scan.  I discussed this schedule with her in the event that she does moved to [place] after that visit.        If patient has any questions or concerns, she should feel free to contact me.

## 2022-03-17 NOTE — NON-PROVIDER
Patient was seen today in clinic with Dr. Richey for Follow up.  Vitals signs and weight were obtained and pain assessment was completed.  Allergies and medications were reviewed with the patient.  Toxicities of treatment assessed.     Vitals/Pain:  Vitals:    03/17/22 1313   BP: 104/69   Pulse: 86   Temp: 36.3 °C (97.4 °F)   SpO2: 94%   Pain Score: No pain        Allergies:   Pcn [penicillins] and Other environmental    Current Medications:  Current Outpatient Medications   Medication Sig Dispense Refill   • methylPREDNISolone (MEDROL DOSEPAK) 4 MG Tablet Therapy Pack Take as directed. 21 Tablet 0   • azithromycin (ZITHROMAX) 250 MG Tab Take 2 tablets on day 1, then take 1 tablet a day for 4 days. 6 Tablet 0   • spironolactone (ALDACTONE) 50 MG Tab TAKE 1 TABLET BY MOUTH DAILY 100 Tablet 2   • Multiple Vitamins-Minerals (ALIVE MULTI-VITAMIN PO) Take  by mouth. FRUIT AND VEGGIES     • COVID-19 mRNA Vaccine, Pfizer, (PFIZER-BIONTECH COVID-19 VACC) 30 MCG/0.3ML Suspension injection Inject  into the shoulder, thigh, or buttocks. 0.3 mL 0   • albuterol 108 (90 Base) MCG/ACT Aero Soln inhalation aerosol albuterol sulfate HFA 90 mcg/actuation aerosol inhaler   INHALE 2 PUFFS BY MOUTH EVERY 6 HOURS AS NEEDED FOR SHORTNESS OF BREATH     • atorvastatin (LIPITOR) 80 MG tablet TAKE 1 TABLET BY MOUTH EVERY DAY (Patient taking differently: Take 80 mg by mouth every evening.) 90 Tablet 3   • traZODone (DESYREL) 150 MG Tab TAKE 1 TABLET BY MOUTH TWICE DAILY (Patient taking differently: Take 100 mg by mouth at bedtime.) 180 Tablet 3   • escitalopram (LEXAPRO) 20 MG tablet Take 1 Tablet by mouth every day. 30 Tablet 11   • levalbuterol (XOPENEX HFA) 45 MCG/ACT inhaler Inhale 1-2 Puffs every four hours as needed for Shortness of Breath. 15 g 0   • ipratropium-albuterol (DUONEB) 0.5-2.5 (3) MG/3ML nebulizer solution Take 3 mL by nebulization every four hours as needed for Shortness of Breath. 30 Each 0   • Multiple Vitamins-Minerals  (DAILY MULTIVITAMIN PO) Take 1 Tablet by mouth every morning.     • losartan (COZAAR) 100 MG Tab Take 0.5 Tablets by mouth every day. 90 tablet 1   • levothyroxine (SYNTHROID) 88 MCG Tab Take 1 tablet by mouth every morning on an empty stomach. 90 tablet 3     No current facility-administered medications for this encounter.         PCP:  Kehinde Obrien, Med Ass't

## 2022-03-21 ENCOUNTER — TELEPHONE (OUTPATIENT)
Dept: RESPIRATORY THERAPY | Facility: MEDICAL CENTER | Age: 77
End: 2022-03-21
Payer: MEDICARE

## 2022-03-21 NOTE — TELEPHONE ENCOUNTER
COPD program follow up phone call:    Did you stop smoking because of the program? Yes  Not smoking at time of follow up? Yes  Did you refill your medications?Yes  Did you take them everyday as prescribed? Yes  Have you seen [facility] since discharging from the hospital? n/a  Have you seen Home Health since discharging from the hospital? n/a  Have you seen [facility] since discharging from the hospital? n/a  Have you seen [facility] since [facility] from the hospital? n/a

## 2022-03-30 ENCOUNTER — APPOINTMENT (OUTPATIENT)
Dept: SLEEP MEDICINE | Facility: MEDICAL CENTER | Age: 77
End: 2022-03-30
Attending: INTERNAL MEDICINE
Payer: MEDICARE

## 2022-03-30 VITALS — BODY MASS INDEX: 23.3 KG/M2 | WEIGHT: 145 LBS | HEIGHT: 66 IN

## 2022-03-30 DIAGNOSIS — J96.11 CHRONIC RESPIRATORY FAILURE WITH HYPOXIA (HCC): ICD-10-CM

## 2022-03-30 PROCEDURE — 94618 PULMONARY STRESS TESTING: CPT | Performed by: INTERNAL MEDICINE

## 2022-03-30 ASSESSMENT — 6 MINUTE WALK TEST (6MWT)
HEART RATE AT 1 MIN: 100
PERCEIVED FATIGUE AT 1 MIN: 0
BLOOD PRESSURE AT 1 MIN: 120/75
PERCEIVED BREATHLESSNESS AT 2 MIN: 0
PERCEIVED FATIGUE AT 6 MIN: 1.5
BLOOD PRESSURE: LEFT ARM
PERCEIVED FATIGUE AT 2 MIN: 1
PERCEIVED BREATHLESSNESS AT 1 MIN: 0
PERCEIVED BREATHLESSNESS AT 1 MIN: 0
SAO2 AT 1 MIN: 93
PERCEIVED FATIGUE AT 2 MIN: 0
SAO2 AT 4 MIN: 90
PERCENT OF NORMAL WALKED: 61
PERCEIVED BREATHLESSNESS AT 3 MIN: 1
SAO2 AT 2 MIN: 92
SAO2 AT 1 MIN: 92
HEART RATE AT 2 MIN: 100
SAO2 AT 5 MIN: 90
SAO2 AT 6 MIN: 92
HEART RATE AT 1 MIN: 78
PERCEIVED BREATHLESSNESS AT 6 MIN: 1.5
SAO2 AT 2 MIN: 91
O2 SAT PERCENT ROOM AIR: 93
PERCEIVED FATIGUE AT 3 MIN: 2
HEART RATE: 74
SAO2 AT 3 MIN: 91
PERCEIVED FATIGUE AT 4 MIN: 1
PERCEIVED BREATHLESSNESS AT 4 MIN: 1
PERCEIVED BREATHLESSNESS AT 5 MIN: 1
PERCEIVED FATIGUE AT 1 MIN: 0
HEART RATE AT 6 MIN: 95
HEART RATE AT 5 MIN: 107
HEART RATE AT 4 MIN: 102
PERCEIVED BREATHLESSNESS AT 2 MIN: 0
HEART RATE AT 2 MIN: 72
SITTING BLOOD PRESSURE: 118/72
PERCEIVED FATIGUE AT 5 MIN: 1
HEART RATE AT 3 MIN: 98

## 2022-03-30 ASSESSMENT — FIBROSIS 4 INDEX: FIB4 SCORE: 7.09

## 2022-03-30 NOTE — PROCEDURES
Interpretation;  No significant desaturation with ambulation.  Distance walked is 840 feet or 61% predicted.  This test does not support a need for supplemental oxygen with activity however, exercise tolerance is reduced.

## 2022-04-13 ENCOUNTER — HOSPITAL ENCOUNTER (OUTPATIENT)
Dept: RADIOLOGY | Facility: MEDICAL CENTER | Age: 77
End: 2022-04-13
Attending: SURGERY
Payer: MEDICARE

## 2022-04-13 ENCOUNTER — HOME STUDY (OUTPATIENT)
Dept: SLEEP MEDICINE | Facility: MEDICAL CENTER | Age: 77
End: 2022-04-13
Attending: INTERNAL MEDICINE
Payer: MEDICARE

## 2022-04-13 PROCEDURE — 94762 N-INVAS EAR/PLS OXIMTRY CONT: CPT | Performed by: INTERNAL MEDICINE

## 2022-04-14 NOTE — PROCEDURES
Interpretation:    This overnight oximetry was recorded on 4/13/2022 with the patient on room air.  The analysis duration was 5 hours 9 minutes.  30 total oximetric events occurred encompassing 16.6 minutes .  The average event duration was 33.2 seconds .  The saturations were less than 90% for 99.1% of the recording.  The briana saturation was 79% .  The patient spent 2049.5 minutes with saturations < 88%.  Overall, this continuous nocturnal oximetry demonstrates severe persistent hypoxia while breathing room air.      Recommendation:    Clinical correlation is needed.  The patient is a candidate for nocturnal oxygen supplementation and/or an evaluation for sleep disordered breathing.

## 2022-04-16 ENCOUNTER — PATIENT MESSAGE (OUTPATIENT)
Dept: CARDIOLOGY | Facility: MEDICAL CENTER | Age: 77
End: 2022-04-16
Payer: MEDICARE

## 2022-04-18 NOTE — PATIENT COMMUNICATION
Attempted to call pt to further discuss, no answer and no VM set up. [employer] response sent. Noted pt sent another [employer] message today and it was routed to schedulers.

## 2022-04-19 ENCOUNTER — TELEPHONE (OUTPATIENT)
Dept: MEDICAL GROUP | Facility: MEDICAL CENTER | Age: 77
End: 2022-04-19
Payer: MEDICARE

## 2022-04-19 NOTE — TELEPHONE ENCOUNTER
Pt called and would PCP to change Rx Losartan 100mg Tab to a 50mg and take one tab PO QD. Please change since with the 100mg tab she cuts the pill in 0.5

## 2022-04-20 ENCOUNTER — APPOINTMENT (OUTPATIENT)
Dept: URGENT CARE | Facility: PHYSICIAN GROUP | Age: 77
End: 2022-04-20
Payer: MEDICARE

## 2022-04-20 ENCOUNTER — OFFICE VISIT (OUTPATIENT)
Dept: CARDIOLOGY | Facility: MEDICAL CENTER | Age: 77
End: 2022-04-20
Payer: MEDICARE

## 2022-04-20 VITALS
DIASTOLIC BLOOD PRESSURE: 70 MMHG | RESPIRATION RATE: 14 BRPM | HEART RATE: 70 BPM | SYSTOLIC BLOOD PRESSURE: 90 MMHG | BODY MASS INDEX: 23.63 KG/M2 | OXYGEN SATURATION: 92 % | WEIGHT: 147 LBS | HEIGHT: 66 IN

## 2022-04-20 DIAGNOSIS — R06.02 SOB (SHORTNESS OF BREATH): ICD-10-CM

## 2022-04-20 PROCEDURE — 99213 OFFICE O/P EST LOW 20 MIN: CPT | Performed by: NURSE PRACTITIONER

## 2022-04-20 RX ORDER — LOSARTAN POTASSIUM 50 MG/1
50 TABLET ORAL DAILY
Qty: 90 TABLET | Refills: 2 | Status: SHIPPED | OUTPATIENT
Start: 2022-04-20

## 2022-04-20 ASSESSMENT — FIBROSIS 4 INDEX: FIB4 SCORE: 7.09

## 2022-04-20 ASSESSMENT — ENCOUNTER SYMPTOMS
ORTHOPNEA: 0
SPUTUM PRODUCTION: 1
SHORTNESS OF BREATH: 1
DOUBLE VISION: 0
LOSS OF CONSCIOUSNESS: 0
DIZZINESS: 0
PALPITATIONS: 0
HEADACHES: 0
WHEEZING: 1
BLURRED VISION: 0
FALLS: 0
FOCAL WEAKNESS: 0
WEAKNESS: 0
COUGH: 1

## 2022-04-20 NOTE — PROGRESS NOTES
"Chief Complaint   Patient presents with   • Coronary Artery Disease     F/V Dx: Coronary artery disease involving native coronary artery of native heart without angina pectoris   • Abdominal Aortic Aneurysm     F/V Dx: S/P AAA (abdominal aortic aneurysm) repair & Ascending aorta dilatation (HCC)   • HTN (Controlled)   • Dyslipidemia   • Transient Ischemic Attack       Subjective     Ibeth Morillo is a 76 y.o. female who presents today for sob and cough.     She was last seen by Dr. Pulido in our clinic 6/24/2021. history of CAD 2 stents in 2009 at [place], hypertension, AAA s/p endovascular repair. Residual 4.4 cm thoracic aortic aneurysm.     Patient started 5 days ago patient developed shortness of breath, cough, congestion and wheezing. Also notices some chest tightness. She was not sure of any fever. Denies any chills.     Past Medical History:   Diagnosis Date   • AAA (abdominal aortic aneurysm) ([facility])     had surgery for this.   • Arthritis     fingers   • Bladder infection 10/15/2021    antibiotic course done   • Bowel habit changes     diarrhea   • Bronchitis    • CAD (coronary artery disease) 10/4/2012    2 stents [place] 2009    • Cataract     right eye   • Chronic insomnia 5/23/2016   • COPD (chronic obstructive pulmonary disease) ([facility])     \"I think\"   • Dental disorder     upper denture   • Depression 9/26/2013    not a current issue-not on medications   • Diverticula of colon    • Environmental and seasonal allergies    • High cholesterol    • Hyperlipidemia 10/4/2012   • Hypertension    • Hypothyroid 10/17/2013   • Lung nodule     Right   • Lupus (HCC)    • Nonrheumatic aortic valve insufficiency     Dr. Pulido   • Oxygen dependent     1 L O2 NC   • Pneumonia    • Postural hypotension 10/7/2014     IMO load March 2020   • Sleep apnea     (+) ADRIANNA , not using CPAP   • TIA (transient ischemic attack) 10/04/2012    2001 (x2) and then one in 2003   • Tobacco abuse 4/20/2016     Past Surgical " History:   Procedure Laterality Date   • [place] DX BONE MARROW ASPIRATIONS Bilateral 7/14/2021    Procedure: ASPIRATION, BONE MARROW - VEGA;  Surgeon: Jin Conrad M.D.;  Location: ENDOSCOPY [facility];  Service: Orthopedics   • [place] DX BONE MARROW BIOPSIES Bilateral 7/14/2021    Procedure: BIOPSY, BONE MARROW, USING NEEDLE OR TROCAR;  Surgeon: Jin Conrad M.D.;  Location: ENDOSCOPY [facility];  Service: Orthopedics   • AAA WITH STENT GRAFT  02/2020   • STENT PLACEMENT  2008    x 2 Dr Can in [place]   • OTHER Left     plantar wart removed on left foot   • OTHER      throat polyps removed (non cancerous) > 20 years ago     Family History   Problem Relation Age of Onset   • Heart Disease Mother    • Heart Attack Mother    • Heart Disease Father    • Heart Attack Father    • Cancer Maternal Grandmother         stomach   • Diabetes Maternal Grandmother    • Cancer Maternal Grandfather         brain   • Stroke Paternal Grandmother    • Cancer Other      Social History     Socioeconomic History   • Marital status:      Spouse name: Not on file   • Number of children: Not on file   • Years of education: Not on file   • Highest education level: Not on file   Occupational History   • Not on file   Tobacco Use   • Smoking status: Former Smoker     Packs/day: 0.50     Years: 40.00     Pack years: 20.00     Types: Cigarettes     Quit date: 2/10/2019     Years since quitting: 3.1   • Smokeless tobacco: Never Used   Vaping Use   • Vaping Use: Never used   Substance and Sexual Activity   • Alcohol use: Not Currently   • Drug use: Not Currently   • Sexual activity: Not Currently     Partners: Male   Other Topics Concern   • Not on file   Social History Narrative    On disability      Social Determinants of Health     Financial Resource Strain: Not on file   Food Insecurity: Not on file   Transportation Needs: Not on file   Physical Activity: Not on file   Stress: Not on file   Social Connections: Not on file    Intimate Partner Violence: Not on file   Housing Stability: Not on file     Allergies   Allergen Reactions   • Pcn [Penicillins] Shortness of Breath, Rash and Swelling      Tolerated ceftriaxone 8/15/21.   • Other Environmental Runny Nose     Dogs, cats     Outpatient Encounter Medications as of 4/20/2022   Medication Sig Dispense Refill   • losartan (COZAAR) 50 MG Tab Take 1 Tablet by mouth every day. 90 Tablet 2   • levothyroxine (SYNTHROID) 88 MCG Tab TAKE 1 TABLET BY MOUTH EVERY MORNING ON AN EMPTY STOMACH 90 Tablet 1   • spironolactone (ALDACTONE) 50 MG Tab TAKE 1 TABLET BY MOUTH DAILY 100 Tablet 2   • Multiple Vitamins-Minerals (ALIVE MULTI-VITAMIN PO) Take  by mouth every 48 hours. FRUIT AND VEGGIES     • albuterol 108 (90 Base) MCG/ACT Aero Soln inhalation aerosol Inhale 2 Puffs every four hours as needed.     • atorvastatin (LIPITOR) 80 MG tablet TAKE 1 TABLET BY MOUTH EVERY DAY (Patient taking differently: Take 80 mg by mouth every evening.) 90 Tablet 3   • traZODone (DESYREL) 150 MG Tab TAKE 1 TABLET BY MOUTH TWICE DAILY (Patient taking differently: Take 100 mg by mouth at bedtime.) 180 Tablet 3   • escitalopram (LEXAPRO) 20 MG tablet Take 1 Tablet by mouth every day. 30 Tablet 11   • levalbuterol (XOPENEX HFA) 45 MCG/ACT inhaler Inhale 1-2 Puffs every four hours as needed for Shortness of Breath. 15 g 0   • ipratropium-albuterol (DUONEB) 0.5-2.5 (3) MG/3ML nebulizer solution Take 3 mL by nebulization every four hours as needed for Shortness of Breath. 30 Each 0   • Multiple Vitamins-Minerals (DAILY MULTIVITAMIN PO) Take 1 Tablet by mouth every morning.     • [DISCONTINUED] methylPREDNISolone (MEDROL DOSEPAK) 4 MG Tablet Therapy Pack Take as directed. 21 Tablet 0     No facility-administered encounter medications on file as of 4/20/2022.     Review of Systems   Constitutional: Positive for malaise/fatigue.   Eyes: Negative for blurred vision and double vision.   Respiratory: Positive for cough, sputum  "production, shortness of breath and wheezing.    Cardiovascular: Negative for chest pain, palpitations, orthopnea and leg swelling.   Musculoskeletal: Negative for falls.   Neurological: Negative for dizziness, focal weakness, loss of consciousness, weakness and headaches.   All other systems reviewed and are negative.             Objective     BP (!) 90/70 (BP Location: Left arm, Patient Position: Sitting, BP Cuff Size: Adult)   Pulse 70   Resp 14   Ht 1.676 m (5' 6\")   Wt 66.7 kg (147 lb)   LMP  (LMP Unknown)   SpO2 92%   BMI 23.73 kg/m²     Physical Exam  Constitutional:       Appearance: She is ill-appearing.   Cardiovascular:      Rate and Rhythm: Normal rate and regular rhythm.   Pulmonary:      Breath sounds: Wheezing present.   Neurological:      General: No focal deficit present.      Mental Status: She is alert and oriented to person, place, and time.   Psychiatric:         Mood and Affect: Mood normal.         Behavior: Behavior normal.         Thought Content: Thought content normal.         Judgment: Judgment normal.            ECHO  8/14/2021  Left ventricular systolic function is normal.   Left ventricular ejection fraction is visually estimated to be 55%.   No regional wall motion abnormality.  Mild to moderate aortic insufficiency.  Ascending aorta is dilated with a diameter of 4.3 cm.      Myocardial Perfusion  7/16/2021   NUCLEAR IMAGING INTERPRETATION   No evidence of significant jeopardized viable myocardium or prior myocardial    infarction.   Normal left ventricular size, ejection fraction, and wall motion.   ECG INTERPRETATION   Negative stress ECG for ischemia.       CT chest   3/14/2022  IMPRESSION:     1.  Peripheral right upper lobe consolidative mass is mildly decreased in size and density compared to the prior examination.  2.  Stable right lower lobe 4 mm pulmonary nodule.  3.  Underlying emphysematous changes.  4.  Previously noted groundglass opacities in the right lower lobe " have resolved and were likely infectious/inflammatory.  5.  Mildly enlarged mediastinal lymph nodes are decreased in size compared to the prior examination.  6.  Aneurysmal ascending aorta measuring 4.2 x 4.1 cm. Atherosclerotic plaque including coronary artery calcification.  7.  Heterogeneous appearance of spleen is likely perfusional. Underlying splenic lesions not excluded.  8.  Small hiatal hernia.  9.  Partially imaged abdominal aortic aneurysm with an endovascular stent graft.    Assessment & Plan     1. SOB (shortness of breath)         Medical Decision Making: Today's Assessment/Status/Plan:          1. SOB:  - recommend patient reports to urgent care or Emergency room. Her  brought her to the OV. She was able to walk into the office without dizziness.    - BP 90/70s oxygen saturation 92% on room air   - noted bilateral wheezing   - no LE edema or abdominal distention.    Follow up after urgent care or emergency room

## 2022-04-21 ENCOUNTER — APPOINTMENT (OUTPATIENT)
Dept: RADIOLOGY | Facility: IMAGING CENTER | Age: 77
End: 2022-04-21
Attending: PHYSICIAN ASSISTANT
Payer: MEDICARE

## 2022-04-21 ENCOUNTER — OFFICE VISIT (OUTPATIENT)
Dept: URGENT CARE | Facility: PHYSICIAN GROUP | Age: 77
End: 2022-04-21
Payer: MEDICARE

## 2022-04-21 ENCOUNTER — HOSPITAL ENCOUNTER (OUTPATIENT)
Facility: MEDICAL CENTER | Age: 77
End: 2022-04-21
Attending: PHYSICIAN ASSISTANT
Payer: MEDICARE

## 2022-04-21 VITALS
TEMPERATURE: 97.2 F | BODY MASS INDEX: 23.75 KG/M2 | OXYGEN SATURATION: 93 % | HEART RATE: 64 BPM | HEIGHT: 66 IN | DIASTOLIC BLOOD PRESSURE: 60 MMHG | RESPIRATION RATE: 16 BRPM | SYSTOLIC BLOOD PRESSURE: 118 MMHG | WEIGHT: 147.8 LBS

## 2022-04-21 DIAGNOSIS — R05.9 COUGH: ICD-10-CM

## 2022-04-21 DIAGNOSIS — R06.02 SHORTNESS OF BREATH: ICD-10-CM

## 2022-04-21 DIAGNOSIS — J44.1 COPD EXACERBATION (HCC): ICD-10-CM

## 2022-04-21 PROCEDURE — 99214 OFFICE O/P EST MOD 30 MIN: CPT | Performed by: PHYSICIAN ASSISTANT

## 2022-04-21 PROCEDURE — 71046 X-RAY EXAM CHEST 2 VIEWS: CPT | Mod: TC | Performed by: PHYSICIAN ASSISTANT

## 2022-04-21 PROCEDURE — U0003 INFECTIOUS AGENT DETECTION BY NUCLEIC ACID (DNA OR RNA); SEVERE ACUTE RESPIRATORY SYNDROME CORONAVIRUS 2 (SARS-COV-2) (CORONAVIRUS DISEASE [COVID-19]), AMPLIFIED PROBE TECHNIQUE, MAKING USE OF HIGH THROUGHPUT TECHNOLOGIES AS DESCRIBED BY CMS-2020-01-R: HCPCS

## 2022-04-21 PROCEDURE — U0005 INFEC AGEN DETEC AMPLI PROBE: HCPCS

## 2022-04-21 RX ORDER — PREDNISONE 10 MG/1
40 TABLET ORAL DAILY
Qty: 20 TABLET | Refills: 0 | Status: SHIPPED | OUTPATIENT
Start: 2022-04-21 | End: 2022-04-26

## 2022-04-21 RX ORDER — AZITHROMYCIN 250 MG/1
TABLET, FILM COATED ORAL
Qty: 6 TABLET | Refills: 0 | Status: SHIPPED | OUTPATIENT
Start: 2022-04-21 | End: 2022-04-26

## 2022-04-21 ASSESSMENT — ENCOUNTER SYMPTOMS
COUGH: 1
FEVER: 0
MYALGIAS: 1
RHINORRHEA: 1
WHEEZING: 1
SORE THROAT: 0
EYE REDNESS: 0
SHORTNESS OF BREATH: 1
EYE DISCHARGE: 0
DIARRHEA: 0
VOMITING: 0
HEADACHES: 1

## 2022-04-21 ASSESSMENT — FIBROSIS 4 INDEX: FIB4 SCORE: 7.09

## 2022-04-21 ASSESSMENT — COPD QUESTIONNAIRES: COPD: 1

## 2022-04-21 NOTE — PROGRESS NOTES
Subjective     Ibeth Morillo is a 76 y.o. female who presents with Cough (SOB, wheezing, fatigue, hoarseness x 1 week) and Hypertension Follow-up (Low blood pressure, yesterday was 90/70 that was taken the cardiologist.)          Cough  This is a new problem. Episode onset: x 1 week ago. The problem has been unchanged. The cough is non-productive. Associated symptoms include headaches, myalgias, nasal congestion, rhinorrhea, shortness of breath (The patient states she is experiencing increased shortness of breath with exertion.  The patient reports shortness of breath at baseline secondary to COPD and lung cancer.  The patient states she typically uses supplemental O2 24/7.) and wheezing. Pertinent negatives include no chest pain, ear pain, eye redness, fever or sore throat. She has tried a beta-agonist inhaler (Patient states that she has been using her albuterol inhaler, which helps with her wheezing but not her overall shortness of breath.) for the symptoms. Her past medical history is significant for COPD. The patient also has a history of lung cancer.     The patient states she uses supplemental O2 24/7 at 2 L.  The patient states she has not needed to increase her supplemental oxygen since becoming ill.    The patient states she also feels like her blood pressure has been been low over the past week.  The patient states she was seen at cardiology yesterday and her blood pressure was found to be 90/70.  The patient states the cardiology advanced practitioner did not change her blood pressure regimen.  The patient states she is continue to take her blood pressure medications as prescribed.    The patient reports no recent sick contacts.  No known exposure to COVID-19.  No exposure to influenza.    PMH:  has a past medical history of AAA (abdominal aortic aneurysm) (HCC), Arthritis, Bladder infection (10/15/2021), Bowel habit changes, Bronchitis, CAD (coronary artery disease) (10/4/2012), Cataract, Chronic  insomnia (5/23/2016), COPD (chronic obstructive pulmonary disease) ([facility]), Dental disorder, Depression (9/26/2013), Diverticula of colon, Environmental and seasonal allergies, High cholesterol, Hyperlipidemia (10/4/2012), Hypertension, Hypothyroid (10/17/2013), Lung nodule, Lupus (HCC), Nonrheumatic aortic valve insufficiency, Oxygen dependent, Pneumonia, Postural hypotension (10/7/2014), Sleep apnea, TIA (transient ischemic attack) (10/04/2012), and Tobacco abuse (4/20/2016).  MEDS:   Current Outpatient Medications:   •  losartan (COZAAR) 50 MG Tab, Take 1 Tablet by mouth every day., Disp: 90 Tablet, Rfl: 2  •  levothyroxine (SYNTHROID) 88 MCG Tab, TAKE 1 TABLET BY MOUTH EVERY MORNING ON AN EMPTY STOMACH, Disp: 90 Tablet, Rfl: 1  •  spironolactone (ALDACTONE) 50 MG Tab, TAKE 1 TABLET BY MOUTH DAILY, Disp: 100 Tablet, Rfl: 2  •  Multiple Vitamins-Minerals (ALIVE MULTI-VITAMIN PO), Take  by mouth every 48 hours. FRUIT AND VEGGIES, Disp: , Rfl:   •  albuterol 108 (90 Base) MCG/ACT Aero Soln inhalation aerosol, Inhale 2 Puffs every four hours as needed., Disp: , Rfl:   •  atorvastatin (LIPITOR) 80 MG tablet, TAKE 1 TABLET BY MOUTH EVERY DAY (Patient taking differently: Take 80 mg by mouth every evening.), Disp: 90 Tablet, Rfl: 3  •  traZODone (DESYREL) 150 MG Tab, TAKE 1 TABLET BY MOUTH TWICE DAILY (Patient taking differently: Take 100 mg by mouth at bedtime.), Disp: 180 Tablet, Rfl: 3  •  escitalopram (LEXAPRO) 20 MG tablet, Take 1 Tablet by mouth every day., Disp: 30 Tablet, Rfl: 11  •  levalbuterol (XOPENEX HFA) 45 MCG/ACT inhaler, Inhale 1-2 Puffs every four hours as needed for Shortness of Breath., Disp: 15 g, Rfl: 0  •  ipratropium-albuterol (DUONEB) 0.5-2.5 (3) MG/3ML nebulizer solution, Take 3 mL by nebulization every four hours as needed for Shortness of Breath., Disp: 30 Each, Rfl: 0  •  Multiple Vitamins-Minerals (DAILY MULTIVITAMIN PO), Take 1 Tablet by mouth every morning. (Patient not taking: Reported on  4/21/2022), Disp: , Rfl:   ALLERGIES:   Allergies   Allergen Reactions   • Pcn [Penicillins] Shortness of Breath, Rash and Swelling      Tolerated ceftriaxone 8/15/21.   • Other Environmental Runny Nose     Dogs, cats     SURGHX:   Past Surgical History:   Procedure Laterality Date   • [place] DX BONE MARROW ASPIRATIONS Bilateral 7/14/2021    Procedure: ASPIRATION, BONE MARROW - VEGA;  Surgeon: Jin Conrad M.D.;  Location: [facility]OSCOPY [facility];  Service: Orthopedics   • [place] DX BONE MARROW BIOPSIES Bilateral 7/14/2021    Procedure: BIOPSY, BONE MARROW, USING NEEDLE OR TROCAR;  Surgeon: Jin Conrad M.D.;  Location: ENDOSCOPY [facility];  Service: Orthopedics   • AAA WITH STENT GRAFT  02/2020   • STENT PLACEMENT  2008    x 2 Dr Can in [place]   • OTHER Left     plantar wart removed on left foot   • OTHER      throat polyps removed (non cancerous) > 20 years ago     SOCHX:  reports that she quit smoking about 3 years ago. Her smoking use included cigarettes. She has a 20.00 pack-year smoking history. She has never used smokeless tobacco. She reports previous alcohol use. She reports previous drug use.  FH: Family history was reviewed, no pertinent findings to report      Review of Systems   Constitutional: Positive for malaise/fatigue. Negative for fever.   HENT: Positive for congestion and rhinorrhea. Negative for ear pain and sore throat.    Eyes: Negative for discharge and redness.   Respiratory: Positive for cough, shortness of breath (The patient states she is experiencing increased shortness of breath with exertion.  The patient reports shortness of breath at baseline secondary to COPD and lung cancer.  The patient states she typically uses supplemental O2 24/7.) and wheezing.    Cardiovascular: Negative for chest pain and leg swelling.   Gastrointestinal: Negative for diarrhea and vomiting.   Musculoskeletal: Positive for myalgias.   Neurological: Positive for headaches.              Objective     BP  "118/60 (BP Location: Left arm, Patient Position: Sitting, BP Cuff Size: Adult)   Pulse 64   Temp 36.2 °C (97.2 °F) (Temporal)   Resp 16   Ht 1.676 m (5' 6\")   Wt 67 kg (147 lb 12.8 oz)   LMP  (LMP Unknown)   SpO2 93%   BMI 23.86 kg/m²      Physical Exam  Constitutional:       General: She is not in acute distress.     Appearance: Normal appearance. She is not ill-appearing.   HENT:      Head: Normocephalic and atraumatic.      Right Ear: External ear normal.      Left Ear: External ear normal.      Nose: Nose normal.      Mouth/Throat:      Mouth: Mucous membranes are moist.      Pharynx: Oropharynx is clear. No posterior oropharyngeal erythema.   Eyes:      Extraocular Movements: Extraocular movements intact.      Conjunctiva/sclera: Conjunctivae normal.   Cardiovascular:      Rate and Rhythm: Normal rate and regular rhythm.      Heart sounds: Normal heart sounds.   Pulmonary:      Effort: Pulmonary effort is normal. No respiratory distress.      Breath sounds: Decreased breath sounds present. No wheezing or rhonchi.   Musculoskeletal:         General: Normal range of motion.      Cervical back: Normal range of motion and neck supple.   Skin:     General: Skin is warm and dry.   Neurological:      Mental Status: She is alert and oriented to person, place, and time.               Progress:  CXR:  COMPARISON:  11/18/2021.    FINDINGS:  LUNGS: Minimal linear atelectasis/scarring in the right midlung periphery. Slight hyperinflation. No focal consolidation.  No effusions.  PNEUMOTHORAX: None.  LINES AND TUBES: None.  MEDIASTINUM: No cardiomegaly. Atherosclerosis.  MUSCULOSKELETAL STRUCTURES: No acute displaced fracture.     IMPRESSION:  No acute cardiopulmonary abnormality.    COVID-19 PCR - pending              Assessment & Plan        1. Cough  - DX-CHEST-2 VIEWS; Future  - SARS-CoV-2 PCR (24 hour In-House): Collect NP swab in VTM; Future    2. Shortness of breath  - DX-CHEST-2 VIEWS; Future    3. COPD " exacerbation (HCC)  - azithromycin (ZITHROMAX) 250 MG Tab; Take 2 Tablets by mouth every day for 1 day, THEN 1 Tablet every day for 4 days.  Dispense: 6 Tablet; Refill: 0  - predniSONE (DELTASONE) 10 MG Tab; Take 4 Tablets by mouth every day for 5 days.  Dispense: 20 Tablet; Refill: 0    The patient's presenting symptoms and physical examinations are consistent with cough and shortness of breath likely secondary to an acute COPD exacerbation.  The patient reports a history of COPD.  This is an acute exacerbation of a chronic problem.  On physical exam, the patient had decreased breath sounds without wheezing, rhonchi, or rales.  The patient's pulse ox was within normal limits.  The remainder the patient's physical exam today in clinic was normal.  The patient appears in no acute distress.  The patient's vital signs are stable and within normal limits.  She is afebrile today in clinic.  A chest x-ray was obtained to further evaluate the patient's current symptoms.  The patient's chest x-ray showed no acute cardiopulmonary abnormality.  The patient reports a history of intolerance to multiple antibiotics, including doxycycline.  Will prescribe the patient azithromycin for acute COPD exacerbation.  The patient has taken this medication in the past without side effects.  Will also prescribe the patient oral prednisone for her acute COPD exacerbation.  As a precaution, will test the patient for COVID-19.  The patient is concerned about her blood pressure being low.  The patient is currently treated for hypertension with losartan.  The patient's blood pressure today in clinic was within normal limits at 116/60.  The patient was recently seen by cardiology and her blood pressure was found be low at 90/70.  However, the patient's blood pressure regimen was not altered at that time due to the patient's low blood pressure.  Advised patient to continue to monitor her blood pressure at home and record a blood pressure log.   Instructed the patient to follow-up with her PCP regarding her blood pressure management.  Recommend OTC medications and supportive care for symptomatic management.  Recommend the patient follow-up with her PCP as mentioned above.  Discussed strict ED precautions with the patient, and she verbalized understanding.    Differential diagnoses, supportive care, and indications for immediate follow-up discussed with patient.   Instructed to return to clinic or nearest emergency department for any change in condition, further concerns, or worsening of symptoms.    OTC Tylenol or Motrin for fever/discomfort.  OTC cough/cold medication for symptomatic relief  OTC Supportive Care for Congestion - saline nasal spray or neti pot  Drink plenty of fluids  Follow-up with PCP  Return to clinic or go to the ED if symptoms worsen or fail to improve, or if the patient should develop worsening/increasing cough, congestion, ear pain, sore throat, shortness of breath, wheezing, chest pain, fever/chills, and/or any concerning symptoms.    Discussed plan with the patient, and she agrees to the above.     I personally reviewed prior external notes and test results pertinent to today's visit.  I have independently reviewed and interpreted all diagnostics ordered during this urgent care visit.       Please note that this dictation was created using voice recognition software. I have made every reasonable attempt to correct obvious errors, but I expect that there may be errors of grammar and possibly content that I did not discover before finalizing the note.     This note was electronically signed by Marsha De La Cruz PA-C

## 2022-04-22 DIAGNOSIS — R05.9 COUGH: ICD-10-CM

## 2022-05-11 ENCOUNTER — TELEPHONE (OUTPATIENT)
Dept: RESPIRATORY THERAPY | Facility: MEDICAL CENTER | Age: 77
End: 2022-05-11
Payer: MEDICARE

## 2022-05-11 NOTE — TELEPHONE ENCOUNTER
COPD program follow up phone call:     Did you stop smoking because of the program? No, quit priot  Not smoking at time of follow up? Yes  Did you refill your medications? Yes  Did you take them everyday as prescribed? Yes  Have you seen your PCP since discharging from the hospital? Yes  Have you seen Home Health since discharging from the hospital? n/a  Have you seen [facility] since discharging from the hospital? n/a  Have you seen [facility] since discharging from the hospital? n/a  Patient is moving to [place] to be with family in June 2022

## 2022-06-01 DIAGNOSIS — C34.11 PRIMARY CANCER OF RIGHT UPPER LOBE OF LUNG (HCC): ICD-10-CM

## 2022-06-06 ENCOUNTER — HOSPITAL ENCOUNTER (OUTPATIENT)
Dept: LAB | Facility: MEDICAL CENTER | Age: 77
End: 2022-06-06
Attending: RADIOLOGY
Payer: MEDICARE

## 2022-06-06 DIAGNOSIS — C34.11 PRIMARY CANCER OF RIGHT UPPER LOBE OF LUNG (HCC): ICD-10-CM

## 2022-06-06 LAB
ANION GAP SERPL CALC-SCNC: 10 MMOL/L (ref 7–16)
BUN SERPL-MCNC: 18 MG/DL (ref 8–22)
CALCIUM SERPL-MCNC: 9 MG/DL (ref 8.5–10.5)
CHLORIDE SERPL-SCNC: 106 MMOL/L (ref 96–112)
CO2 SERPL-SCNC: 25 MMOL/L (ref 20–33)
CREAT SERPL-MCNC: 0.95 MG/DL (ref 0.5–1.4)
GFR SERPLBLD CREATININE-BSD FMLA CKD-EPI: 61 ML/MIN/1.73 M 2
GLUCOSE SERPL-MCNC: 86 MG/DL (ref 65–99)
POTASSIUM SERPL-SCNC: 3.7 MMOL/L (ref 3.6–5.5)
SODIUM SERPL-SCNC: 141 MMOL/L (ref 135–145)

## 2022-06-06 PROCEDURE — 36415 COLL VENOUS BLD VENIPUNCTURE: CPT

## 2022-06-06 PROCEDURE — 80048 BASIC METABOLIC PNL TOTAL CA: CPT

## 2022-06-17 ENCOUNTER — HOSPITAL ENCOUNTER (OUTPATIENT)
Dept: RADIOLOGY | Facility: MEDICAL CENTER | Age: 77
End: 2022-06-17
Attending: RADIOLOGY
Payer: MEDICARE

## 2022-06-17 DIAGNOSIS — C34.11 PRIMARY CANCER OF RIGHT UPPER LOBE OF LUNG (HCC): ICD-10-CM

## 2022-06-17 PROCEDURE — 71260 CT THORAX DX C+: CPT | Mod: ME

## 2022-06-17 PROCEDURE — 700117 HCHG RX CONTRAST REV CODE 255: Performed by: RADIOLOGY

## 2022-06-17 RX ADMIN — IOHEXOL 75 ML: 350 INJECTION, SOLUTION INTRAVENOUS at 08:30

## 2022-06-20 ENCOUNTER — HOSPITAL ENCOUNTER (OUTPATIENT)
Dept: RADIATION ONCOLOGY | Facility: MEDICAL CENTER | Age: 77
End: 2022-06-30
Attending: RADIOLOGY
Payer: MEDICARE

## 2022-06-20 VITALS
BODY MASS INDEX: 23.57 KG/M2 | SYSTOLIC BLOOD PRESSURE: 138 MMHG | WEIGHT: 146 LBS | DIASTOLIC BLOOD PRESSURE: 83 MMHG | HEART RATE: 63 BPM | OXYGEN SATURATION: 94 %

## 2022-06-20 DIAGNOSIS — C34.11 PRIMARY CANCER OF RIGHT UPPER LOBE OF LUNG (HCC): ICD-10-CM

## 2022-06-20 PROCEDURE — 99212 OFFICE O/P EST SF 10 MIN: CPT | Performed by: RADIOLOGY

## 2022-06-20 PROCEDURE — 99214 OFFICE O/P EST MOD 30 MIN: CPT | Performed by: RADIOLOGY

## 2022-06-20 ASSESSMENT — FIBROSIS 4 INDEX: FIB4 SCORE: 7.09

## 2022-06-20 ASSESSMENT — PAIN SCALES - GENERAL: PAINLEVEL: NO PAIN

## 2022-06-20 NOTE — PROGRESS NOTES
Patient was seen today in clinic with Dr. Richey for follow-up.  Vitals signs and weight were obtained and pain assessment was completed.  Allergies and medications were reviewed with the patient.      Vitals/Pain:  Vitals:    06/20/22 1251   BP: 138/83   BP Location: Left arm   Patient Position: Sitting   BP Cuff Size: Adult   Pulse: 63   SpO2: 94%   Weight: 66.2 kg (146 lb)   Pain Score: No pain        Allergies:   Pcn [penicillins] and Other environmental    Current Medications:  Current Outpatient Medications   Medication Sig Dispense Refill   • losartan (COZAAR) 50 MG Tab Take 1 Tablet by mouth every day. 90 Tablet 2   • levothyroxine (SYNTHROID) 88 MCG Tab TAKE 1 TABLET BY MOUTH EVERY MORNING ON AN EMPTY STOMACH 90 Tablet 1   • spironolactone (ALDACTONE) 50 MG Tab TAKE 1 TABLET BY MOUTH DAILY 100 Tablet 2   • Multiple Vitamins-Minerals (ALIVE MULTI-VITAMIN PO) Take  by mouth every 48 hours. FRUIT AND VEGGIES     • albuterol 108 (90 Base) MCG/ACT Aero Soln inhalation aerosol Inhale 2 Puffs every four hours as needed.     • atorvastatin (LIPITOR) 80 MG tablet TAKE 1 TABLET BY MOUTH EVERY DAY (Patient taking differently: Take 80 mg by mouth every evening.) 90 Tablet 3   • traZODone (DESYREL) 150 MG Tab TAKE 1 TABLET BY MOUTH TWICE DAILY (Patient taking differently: Take 100 mg by mouth at bedtime.) 180 Tablet 3   • escitalopram (LEXAPRO) 20 MG tablet Take 1 Tablet by mouth every day. 30 Tablet 11   • levalbuterol (XOPENEX HFA) 45 MCG/ACT inhaler Inhale 1-2 Puffs every four hours as needed for Shortness of Breath. 15 g 0   • ipratropium-albuterol (DUONEB) 0.5-2.5 (3) MG/3ML nebulizer solution Take 3 mL by nebulization every four hours as needed for Shortness of Breath. 30 Each 0   • Multiple Vitamins-Minerals (DAILY MULTIVITAMIN PO) Take 1 Tablet by mouth every morning. (Patient not taking: Reported on 4/21/2022)       No current facility-administered medications for this encounter.          PCP:  Kehinde Rosales R.N.

## 2022-06-20 NOTE — PROGRESS NOTES
RADIATION ONCOLOGY FOLLOW UP    DATE OF SERVICE: 6/20/2022    IDENTIFICATION:   Stage IB ([id number]) non-small cell lung carcinoma, adenocarcinoma histology, lipidic pattern, well differentiated of the right upper lobe of lung, medically inoperable, definitive SBRT to 6000 cGy in December 2021       INTERVAL HISTORY: I had the pleasure of seeing Ms. Morillo today in follow up for her lung cancer.  Patient states from a symptom standpoint she has had continued expected increase in her shortness of breath.  She still uses an intermittent inhaler.  What she has noted is [place] has been more limited in her packing ability than she would have anticipated.  She states she rests and then is able to proceed with her activities but has more interruptions from shortness of breath.  Per her most recent pulmonary function test she has not required any supplemental oxygen.  She is likely due for repeat pulmonary function tests.  She states she will establish this with her pulmonary group at [facility].  She presents today to go over her CT scan results.  These show no evidence of active disease.  All findings are appropriate for posttreatment effect.  Her previously noted aneurysm remains stable.  She is otherwise been in her usual health.    PHYSICAL EXAM:    Vitals:    06/20/22 1251   BP: 138/83   BP Location: Left arm   Patient Position: Sitting   BP Cuff Size: Adult   Pulse: 63   SpO2: 94%   Weight: 66.2 kg (146 lb)   Pain Score: No pain      1= Restricted in physically strenuous activity, but ambulatory and able to carry out work of a light sedentary nature, e.g., light housework, office work.    PAIN:  0    GENERAL: No apparent distress.  HEENT:  Pupils are equal, round, and reactive to light.  Extraocular muscles   are intact. Sclerae nonicteric.  Conjunctivae pink.  Oral cavity, tongue   protrudes midline.   NECK:  Supple without evidence of thyromegaly.  NODES:  No  peripheral adenopathy of the neck, supraclavicular fossa or axillae   bilaterally.  LUNGS:  Clear to ascultation bilaterally   HEART:  Regular rate and rhythm.  No murmur appreciated  ABDOMEN:  Soft. No evidence of hepatosplenomegaly.  Positive bowel sounds.  EXTREMITIES:  Without Edema.  NEUROLOGIC:  Cranial nerves II through XII were intact. Normal stance and gait motor and sensory grossly within normal limits    IMPRESSION:   Stage IB ([id number]) non-small cell lung carcinoma, adenocarcinoma histology, lipidic pattern, well differentiated of the right upper lobe of lung, medically inoperable, definitive SBRT to 6000 cGy in December 2021      RECOMMENDATIONS:   I discussed with the patient her findings over a 30 minute time period, 95% of that time dedicated to an ongoing survivorship plan.  I reviewed with patient her survivorship plan which she remembers well.  This will entail CT scan of the chest every 3 months for the first year posttreatment.  If all findings remain without evidence of disease she would then transition to every 6 months for the second year.  Then yearly thereafter.  Her intention is to establish care at [facility].  Once when she establishes care if they are not epic users with care everywhere then we will transfer her records for continuum of care.    If patient has any questions or concerns, she should feel free to contact me.

## 2022-06-20 NOTE — ADDENDUM NOTE
Encounter addended by: Frances Rosales R.N. on: 6/20/2022 1:31 PM   Actions taken: Clinical Note Signed

## 2022-06-27 ENCOUNTER — OFFICE VISIT (OUTPATIENT)
Dept: URGENT CARE | Facility: PHYSICIAN GROUP | Age: 77
End: 2022-06-27
Payer: MEDICARE

## 2022-06-27 VITALS
DIASTOLIC BLOOD PRESSURE: 68 MMHG | RESPIRATION RATE: 16 BRPM | BODY MASS INDEX: 23.63 KG/M2 | HEART RATE: 84 BPM | OXYGEN SATURATION: 92 % | TEMPERATURE: 98.5 F | SYSTOLIC BLOOD PRESSURE: 96 MMHG | WEIGHT: 147 LBS | HEIGHT: 66 IN

## 2022-06-27 DIAGNOSIS — N30.01 ACUTE CYSTITIS WITH HEMATURIA: ICD-10-CM

## 2022-06-27 LAB
APPEARANCE UR: CLEAR
BILIRUB UR STRIP-MCNC: NORMAL MG/DL
COLOR UR AUTO: YELLOW
GLUCOSE UR STRIP.AUTO-MCNC: 100 MG/DL
KETONES UR STRIP.AUTO-MCNC: NEGATIVE MG/DL
LEUKOCYTE ESTERASE UR QL STRIP.AUTO: NORMAL
NITRITE UR QL STRIP.AUTO: POSITIVE
PH UR STRIP.AUTO: 6 [PH] (ref 5–8)
PROT UR QL STRIP: 100 MG/DL
RBC UR QL AUTO: NORMAL
SP GR UR STRIP.AUTO: 1.03
UROBILINOGEN UR STRIP-MCNC: 1 MG/DL

## 2022-06-27 PROCEDURE — 99213 OFFICE O/P EST LOW 20 MIN: CPT | Performed by: PHYSICIAN ASSISTANT

## 2022-06-27 PROCEDURE — 81002 URINALYSIS NONAUTO W/O SCOPE: CPT | Performed by: PHYSICIAN ASSISTANT

## 2022-06-27 RX ORDER — SULFAMETHOXAZOLE AND TRIMETHOPRIM 800; 160 MG/1; MG/1
1 TABLET ORAL 2 TIMES DAILY
Qty: 10 TABLET | Refills: 0 | Status: SHIPPED | OUTPATIENT
Start: 2022-06-27 | End: 2022-06-29

## 2022-06-27 ASSESSMENT — FIBROSIS 4 INDEX: FIB4 SCORE: 7.18

## 2022-06-28 ENCOUNTER — TELEPHONE (OUTPATIENT)
Dept: URGENT CARE | Facility: PHYSICIAN GROUP | Age: 77
End: 2022-06-28

## 2022-06-28 NOTE — PROGRESS NOTES
Subjective:   Evie Morillo is a 77 y.o. female who presents for UTI (Freq urination, lower back pain, slight fever, burning in urination x2/3 days )      HPI  Patient is a 77-year-old female who presents to the clinic with complaints of possible UTI onset 2 days ago.  History of UTIs and this feels similar.  She complains of dysuria, urinary urgency, and urinary frequency.  She felt feverish intermittently.  She reports right flank pain. Denies any abdominal pain, vomiting, vomiting.       Medications:    • albuterol Aers  • ALIVE MULTI-VITAMIN PO  • atorvastatin  • DAILY MULTIVITAMIN PO  • escitalopram  • ipratropium-albuterol  • levalbuterol  • levothyroxine Tabs  • losartan Tabs  • spironolactone Tabs  • traZODone Tabs    Allergies: Pcn [penicillins] and Other environmental    Problem List: Evie Morillo does not have any pertinent problems on file.    Surgical History:  Past Surgical History:   Procedure Laterality Date   • VA DX BONE MARROW ASPIRATIONS Bilateral 7/14/2021    Procedure: ASPIRATION, BONE MARROW - VEGA;  Surgeon: Jin Conrad M.D.;  Location: ENDOSCOPY [facility];  Service: Orthopedics   • VA DX BONE MARROW BIOPSIES Bilateral 7/14/2021    Procedure: BIOPSY, BONE MARROW, USING NEEDLE OR TROCAR;  Surgeon: Jin Conrad M.D.;  Location: ENDOSCOPY [facility];  Service: Orthopedics   • AAA WITH STENT GRAFT  02/2020   • STENT PLACEMENT  2008    x 2 Dr Can in [place]   • OTHER Left     plantar wart removed on left foot   • OTHER      throat polyps removed (non cancerous) > 20 years ago       Past Social Hx: Evie Morillo  reports that she quit smoking about 3 years ago. Her smoking use included cigarettes. She has a 20.00 pack-year smoking history. She has never used smokeless tobacco. She reports previous alcohol use. She reports previous drug use.     Past Family Hx:  Evie Morillo family history includes Cancer in her maternal grandfather, maternal grandmother, and  "another family member; Diabetes in her maternal grandmother; Heart Attack in her father and mother; Heart Disease in her father and mother; Stroke in her paternal grandmother.     Problem list, medications, and allergies reviewed by myself today in Epic.     Objective:     BP (!) 96/68 (BP Location: Left arm, Patient Position: Sitting, BP Cuff Size: Adult)   Pulse 84   Temp 36.9 °C (98.5 °F) (Temporal)   Resp 16   Ht 1.676 m (5' 6\")   Wt 66.7 kg (147 lb)   LMP  (LMP Unknown)   SpO2 92%   BMI 23.73 kg/m²     Physical Exam  Vitals reviewed.   Constitutional:       General: She is not in acute distress.     Appearance: Normal appearance. She is not ill-appearing or toxic-appearing.   Eyes:      Conjunctiva/sclera: Conjunctivae normal.      Pupils: Pupils are equal, round, and reactive to light.   Cardiovascular:      Rate and Rhythm: Normal rate and regular rhythm.      Heart sounds: Normal heart sounds.   Pulmonary:      Effort: Pulmonary effort is normal.      Breath sounds: Normal breath sounds.   Abdominal:      General: Abdomen is flat. Bowel sounds are normal. There is no distension.      Palpations: Abdomen is soft.      Tenderness: There is no abdominal tenderness. There is no right CVA tenderness, left CVA tenderness, guarding or rebound.   Musculoskeletal:      Cervical back: Neck supple.   Skin:     General: Skin is warm and dry.   Neurological:      General: No focal deficit present.      Mental Status: She is alert and oriented to person, place, and time.   Psychiatric:         Mood and Affect: Mood normal.         Behavior: Behavior normal.         Diagnosis and associated orders:     1. Acute cystitis with hematuria  - sulfamethoxazole-trimethoprim (BACTRIM DS) 800-160 MG tablet; Take 1 Tablet by mouth 2 times a day for 5 days.  Dispense: 10 Tablet; Refill: 0  - POCT Urinalysis       Comments/MDM:     The patient's presenting symptoms and exam findings are consistent with a urinary tract " infection. They are overall very well-appearing with normal vital signs and benign examination findings.   Increase fluid intake.  Patient was unable to provide and adequate amount of urine for Urine culture.   Advised to return to the Urgent Care or follow up with their PCP if symptoms are not improving in 2-3 days or sooner if any worsening symptoms such as fever, chills, abdominal pain, back/flank pain, nausea, vomiting, or any other concerns.      I personally reviewed prior external notes and test results pertinent to today's visit. Supportive care, natural history, differential diagnoses, and indications for immediate follow-up discussed. Patient expresses understanding and agrees to plan. Patient denies any other questions or concerns.     Follow-up with the primary care physician for recheck, reevaluation, and consideration of further management.    Please note that this dictation was created using voice recognition software. I have made a reasonable attempt to correct obvious errors, but I expect that there are errors of grammar and possibly content that I did not discover before finalizing the note.    This note was electronically signed by John Carter PA-C

## 2022-06-28 NOTE — TELEPHONE ENCOUNTER
1. Name: Ibeth Morillo    Call Back Number: [phone]      How would the patient prefer to be contacted with a response: Phone call OK to leave a detailed message     2. What are the patient's symptoms (location & severity)? Vomiting, headache and feeling shaky     3. Is this a new symptom Yes    4. When did it start? They started after starting the medication Bactrim     5. Action taken per Active Symptom Guide: Urgent Care recommended    6. Patient does not agree to recommended action per Active Symptom Escalation Protocol. Patient was advised again of recommendation and verbalized understanding.      Patient would like the medication changed to something else. She also has questions about the medication and her thyroid and interactions. Please advise.

## 2022-06-29 ENCOUNTER — OFFICE VISIT (OUTPATIENT)
Dept: URGENT CARE | Facility: PHYSICIAN GROUP | Age: 77
End: 2022-06-29
Payer: MEDICARE

## 2022-06-29 ENCOUNTER — TELEPHONE (OUTPATIENT)
Dept: URGENT CARE | Facility: PHYSICIAN GROUP | Age: 77
End: 2022-06-29
Payer: MEDICARE

## 2022-06-29 VITALS
TEMPERATURE: 99.1 F | HEIGHT: 66 IN | BODY MASS INDEX: 22.5 KG/M2 | WEIGHT: 140 LBS | OXYGEN SATURATION: 90 % | SYSTOLIC BLOOD PRESSURE: 128 MMHG | DIASTOLIC BLOOD PRESSURE: 68 MMHG | HEART RATE: 88 BPM | RESPIRATION RATE: 20 BRPM

## 2022-06-29 DIAGNOSIS — T50.905A ADVERSE EFFECT OF DRUG, INITIAL ENCOUNTER: ICD-10-CM

## 2022-06-29 DIAGNOSIS — N30.01 ACUTE CYSTITIS WITH HEMATURIA: ICD-10-CM

## 2022-06-29 PROCEDURE — 99214 OFFICE O/P EST MOD 30 MIN: CPT | Performed by: PHYSICIAN ASSISTANT

## 2022-06-29 RX ORDER — ONDANSETRON 4 MG/1
4 TABLET, ORALLY DISINTEGRATING ORAL EVERY 6 HOURS PRN
Qty: 15 TABLET | Refills: 0 | Status: SHIPPED | OUTPATIENT
Start: 2022-06-29

## 2022-06-29 RX ORDER — NITROFURANTOIN 25; 75 MG/1; MG/1
100 CAPSULE ORAL 2 TIMES DAILY
Qty: 10 CAPSULE | Refills: 0 | Status: SHIPPED | OUTPATIENT
Start: 2022-06-29 | End: 2022-07-04

## 2022-06-29 ASSESSMENT — ENCOUNTER SYMPTOMS
ABDOMINAL PAIN: 0
DIARRHEA: 0
RESPIRATORY NEGATIVE: 1
FLANK PAIN: 0
CARDIOVASCULAR NEGATIVE: 1
NAUSEA: 1
VOMITING: 1
CONSTITUTIONAL NEGATIVE: 1

## 2022-06-29 ASSESSMENT — FIBROSIS 4 INDEX: FIB4 SCORE: 7.18

## 2022-06-29 NOTE — TELEPHONE ENCOUNTER
I spoke with the patient on the phone regarding new symptoms of nausea and vomiting. She believes this is due to the Bactrim. However, she has been vomiting throughout the night she states, feels like she has a fever, and has mild back aches. Discussed possible medication side effect versus worsening UTI symptoms. Patient has an appointment today at urgent care for re-evaluation. Advised her to follow up with this appointment or present to the ER if symptoms worse. Patient understood and agreed to plan of care.

## 2022-06-30 NOTE — PROGRESS NOTES
"  Subjective:     Evie Morillo  is a 77 y.o. female who presents for UTI (Pt was seen on the 27th, was Rx Bactrim. Pt states that it did not her eat at all. )       She presents today with adverse drug reaction of vomiting, she associates this with the Bactrim.  She was seen in the urgent care on 6/27/2022 and diagnosed with acute cystitis with hematuria, she was prescribed Bactrim at that time, and began taking it on 6/28/2022.  She states that the nausea started 1-2 hours after taking the antibiotic medication.  Since that time she has been feeling nauseous and experiencing episodes of dry heaving.  Denies diarrhea.  Denies urticarial rash, swelling of the lips, throat, tongue, face.  At this time she is requesting a medication change to a different UTI antibiotic.  At this time she denies fever, flank pain     Review of Systems   Constitutional: Negative.    Respiratory: Negative.    Cardiovascular: Negative.    Gastrointestinal: Positive for nausea and vomiting. Negative for abdominal pain and diarrhea.   Genitourinary: Positive for dysuria, frequency and urgency. Negative for flank pain and hematuria.      Allergies   Allergen Reactions   • Bactrim Ds Vomiting     Pt states that it made her vomit and pass out    • Pcn [Penicillins] Shortness of Breath, Rash and Swelling      Tolerated ceftriaxone 8/15/21.   • Other Environmental Runny Nose     Dogs, cats     Past Medical History:   Diagnosis Date   • AAA (abdominal aortic aneurysm) ([facility])     had surgery for this.   • Arthritis     fingers   • Bladder infection 10/15/2021    antibiotic course done   • Bowel habit changes     diarrhea   • Bronchitis    • CAD (coronary artery disease) 10/4/2012    2 stents [place] 2009    • Cataract     right eye   • Chronic insomnia 5/23/2016   • COPD (chronic obstructive pulmonary disease) ([facility])     \"I think\"   • Dental disorder     upper denture   • Depression 9/26/2013    not a current issue-not on medications   • " "Diverticula of colon    • Environmental and seasonal allergies    • High cholesterol    • Hyperlipidemia 10/4/2012   • Hypertension    • Hypothyroid 10/17/2013   • Lung nodule     Right   • Lupus (HCC)    • Nonrheumatic aortic valve insufficiency     Dr. Pulido   • Oxygen dependent     1 L O2 NC   • Pneumonia    • Postural hypotension 10/7/2014     IMO load March 2020   • Sleep apnea     (+) ADRIANNA , not using CPAP   • TIA (transient ischemic attack) 10/04/2012    2001 (x2) and then one in 2003   • Tobacco abuse 4/20/2016        Objective:   /68 (BP Location: Right arm, Patient Position: Sitting, BP Cuff Size: Adult)   Pulse 88   Temp 37.3 °C (99.1 °F) (Temporal)   Resp 20   Ht 1.676 m (5' 6\")   Wt 63.5 kg (140 lb)   LMP  (LMP Unknown)   SpO2 90%   BMI 22.60 kg/m²   Physical Exam  Vitals and nursing note reviewed.   Constitutional:       General: She is not in acute distress.     Appearance: She is not ill-appearing or toxic-appearing.   HENT:      Head: Normocephalic.      Nose: No rhinorrhea.   Eyes:      General: No scleral icterus.     Conjunctiva/sclera: Conjunctivae normal.   Cardiovascular:      Rate and Rhythm: Normal rate and regular rhythm.   Pulmonary:      Effort: Pulmonary effort is normal. No respiratory distress.      Breath sounds: Normal breath sounds. No stridor.   Abdominal:      General: Abdomen is flat. Bowel sounds are normal. There is no distension.      Palpations: Abdomen is soft.      Tenderness: There is abdominal tenderness. There is no right CVA tenderness, left CVA tenderness or guarding.   Musculoskeletal:      Cervical back: Neck supple.   Neurological:      Mental Status: She is alert and oriented to person, place, and time.   Psychiatric:         Mood and Affect: Mood normal.         Behavior: Behavior normal.         Thought Content: Thought content normal.         Judgment: Judgment normal.             Diagnostic testing: None    Assessment/Plan:     Encounter " Diagnoses   Name Primary?   • Acute cystitis with hematuria    • Adverse effect of drug, initial encounter         Plan for care for today's complaint includes Nitrofurantoin, Zofran ODT.  Based on her recent urinalysis on 6/27/2022 she did have a UTI, and due to her adverse reaction to Bactrim, we will switch her to nitrofurantoin.  We will provide Zofran for her nausea.  She should continue to monitor symptoms and return to the urgent care if they are not improved in the next 2-3 days.  Follow-up in the emergency department if symptoms get significantly worse.  Prescription for Nitrofurantoin, Zofran provided.    See AVS Instructions below for written guidance provided to patient on after-visit management and care in addition to our verbal discussion during the visit.    Please note that this dictation was created using voice recognition software. I have attempted to correct all errors, but there may be sound-alike, spelling, grammar and possibly content errors that I did not discover before finalizing the note.    Rudyyoko Pereira PA-C

## 2022-07-05 ENCOUNTER — HOSPITAL ENCOUNTER (OUTPATIENT)
Dept: RADIOLOGY | Facility: MEDICAL CENTER | Age: 77
End: 2022-07-05
Attending: SURGERY
Payer: MEDICARE

## 2022-07-05 DIAGNOSIS — I71.40 ABDOMINAL AORTIC ANEURYSM (AAA) WITHOUT RUPTURE (HCC): ICD-10-CM

## 2022-07-05 PROCEDURE — 93978 VASCULAR STUDY: CPT

## 2022-07-06 ENCOUNTER — OFFICE VISIT (OUTPATIENT)
Dept: SLEEP MEDICINE | Facility: MEDICAL CENTER | Age: 77
End: 2022-07-06
Payer: MEDICARE

## 2022-07-06 VITALS
DIASTOLIC BLOOD PRESSURE: 64 MMHG | RESPIRATION RATE: 16 BRPM | HEIGHT: 66 IN | BODY MASS INDEX: 22.6 KG/M2 | OXYGEN SATURATION: 94 % | SYSTOLIC BLOOD PRESSURE: 120 MMHG

## 2022-07-06 DIAGNOSIS — J96.11 CHRONIC RESPIRATORY FAILURE WITH HYPOXIA (HCC): ICD-10-CM

## 2022-07-06 DIAGNOSIS — Z87.891 FORMER SMOKER: ICD-10-CM

## 2022-07-06 DIAGNOSIS — C34.11 PRIMARY CANCER OF RIGHT UPPER LOBE OF LUNG (HCC): ICD-10-CM

## 2022-07-06 DIAGNOSIS — J44.9 CHRONIC OBSTRUCTIVE PULMONARY DISEASE, UNSPECIFIED COPD TYPE (HCC): ICD-10-CM

## 2022-07-06 PROCEDURE — 99214 OFFICE O/P EST MOD 30 MIN: CPT | Performed by: INTERNAL MEDICINE

## 2022-07-06 ASSESSMENT — ENCOUNTER SYMPTOMS
VOMITING: 0
COUGH: 0
DOUBLE VISION: 0
SPUTUM PRODUCTION: 0
BACK PAIN: 0
HEADACHES: 0
CLAUDICATION: 0
FOCAL WEAKNESS: 0
SHORTNESS OF BREATH: 1
DIAPHORESIS: 0
EYE PAIN: 0
DIARRHEA: 0
WHEEZING: 0
NECK PAIN: 0
FALLS: 0
PHOTOPHOBIA: 0
STRIDOR: 0
HEARTBURN: 0
WEIGHT LOSS: 0
PND: 0
BLURRED VISION: 0
NAUSEA: 0
TREMORS: 0
ORTHOPNEA: 0
DEPRESSION: 0
SPEECH CHANGE: 0
ABDOMINAL PAIN: 0
CHILLS: 0
DIZZINESS: 0
EYE DISCHARGE: 0
WEAKNESS: 1
CONSTIPATION: 0
SINUS PAIN: 0
SORE THROAT: 0
MYALGIAS: 0
HEMOPTYSIS: 0
PALPITATIONS: 0
EYE REDNESS: 0
FEVER: 0

## 2022-07-06 NOTE — PROGRESS NOTES
Chief Complaint   Patient presents with   • COPD     Last seen 2/9/22    • Results     6mw 3/30/22, OPO 4/13/22, Ct Chest thorax 6/17/22          HPI: This patient is a 77 y.o. female whom is followed in our clinic for COPD c/b chronic hypoxic respiratory failure and RUL adenocarcinoma last seen by me on 2/9/22.  Other past medical history includes hypothyroid, hypertension and dyslipidemia all currently medicated, coronary artery disease status post percutaneous coronary artery intervention, abdominal aortic aneurysm repair.  She is followed by Dr. Richey for RUL adenoCa with recent CT from 6/22 showing post-radiation changes but no e/o recurrence. The patient did not have a diagnosis of COPD prior to her diagnosis of lung cancer last fall.  Pulmonary function testing from December showed airflow obstruction that was mild with FEV1/FVC ratio of 65 and FEV1 of 79% predicted.  Normal total lung capacity with mild air trapping but DLCO was reduced.  She was hospitalized in August 2021 with pneumonia and discharged on supplemental oxygen.  Lung cancer was discovered when pneumonia did not resolve and she subsequently had biopsy of right upper lobe.  She c/o fatigue and SOB and uses O2 concentrator at home but did not fall below 90% with activity during 6 MWT in March.  She does sleep with oxygen at 1.5 L. She has not suffered AECOPD and uses only CONSTANCE but is using this 2x per day every day. She c/o weakness and tells me she had an episode of her legs giving out on her when going to the register at the grocery store. Sxs improved with rest and hydration, have not recurred.  She is relocating to [place] to be closer to her children next month.    Past Medical History:   Diagnosis Date   • AAA (abdominal aortic aneurysm) (HCC)     had surgery for this.   • Arthritis     fingers   • Bladder infection 10/15/2021    antibiotic course done   • Bowel habit changes     diarrhea   • Bronchitis    • CAD (coronary artery disease)  "10/4/2012    2 stents [place] 2009    • Cataract     right eye   • Chronic insomnia 5/23/2016   • COPD (chronic obstructive pulmonary disease) (HCC)     \"I think\"   • Dental disorder     upper denture   • Depression 9/26/2013    not a current issue-not on medications   • Diverticula of colon    • Environmental and seasonal allergies    • High cholesterol    • Hyperlipidemia 10/4/2012   • Hypertension    • Hypothyroid 10/17/2013   • Lung nodule     Right   • Lupus (HCC)    • Nonrheumatic aortic valve insufficiency     Dr. Pulido   • Oxygen dependent     1 L O2 NC   • Pneumonia    • Postural hypotension 10/7/2014     IMO load March 2020   • Sleep apnea     (+) ADRIANNA , not using CPAP   • TIA (transient ischemic attack) 10/04/2012    2001 (x2) and then one in 2003   • Tobacco abuse 4/20/2016       Social History     Socioeconomic History   • Marital status:      Spouse name: Not on file   • Number of children: Not on file   • Years of education: Not on file   • Highest education level: Not on file   Occupational History   • Not on file   Tobacco Use   • Smoking status: Former Smoker     Packs/day: 0.50     Years: 40.00     Pack years: 20.00     Types: Cigarettes     Quit date: 2/10/2019     Years since quitting: 3.4   • Smokeless tobacco: Never Used   Vaping Use   • Vaping Use: Never used   Substance and Sexual Activity   • Alcohol use: Not Currently   • Drug use: Not Currently   • Sexual activity: Not Currently     Partners: Male   Other Topics Concern   • Not on file   Social History Narrative    On disability      Social Determinants of Health     Financial Resource Strain: Not on file   Food Insecurity: Not on file   Transportation Needs: Not on file   Physical Activity: Not on file   Stress: Not on file   Social Connections: Not on file   Intimate Partner Violence: Not on file   Housing Stability: Not on file       Family History   Problem Relation Age of Onset   • Heart Disease Mother    • Heart Attack " Mother    • Heart Disease Father    • Heart Attack Father    • Cancer Maternal Grandmother         stomach   • Diabetes Maternal Grandmother    • Cancer Maternal Grandfather         brain   • Stroke Paternal Grandmother    • Cancer Other        Current Outpatient Medications on File Prior to Visit   Medication Sig Dispense Refill   • albuterol 108 (90 Base) MCG/ACT Aero Soln inhalation aerosol Inhale 2 Puffs every four hours as needed.     • escitalopram (LEXAPRO) 20 MG tablet Take 1 Tablet by mouth every day. 30 Tablet 11   • ondansetron (ZOFRAN ODT) 4 MG TABLET DISPERSIBLE Take 1 Tablet by mouth every 6 hours as needed for Nausea for up to 15 doses. 15 Tablet 0   • losartan (COZAAR) 50 MG Tab Take 1 Tablet by mouth every day. 90 Tablet 2   • levothyroxine (SYNTHROID) 88 MCG Tab TAKE 1 TABLET BY MOUTH EVERY MORNING ON AN EMPTY STOMACH 90 Tablet 1   • spironolactone (ALDACTONE) 50 MG Tab TAKE 1 TABLET BY MOUTH DAILY 100 Tablet 2   • Multiple Vitamins-Minerals (ALIVE MULTI-VITAMIN PO) Take  by mouth every 48 hours. FRUIT AND VEGGIES     • levalbuterol (XOPENEX HFA) 45 MCG/ACT inhaler Inhale 1-2 Puffs every four hours as needed for Shortness of Breath. 15 g 0   • ipratropium-albuterol (DUONEB) 0.5-2.5 (3) MG/3ML nebulizer solution Take 3 mL by nebulization every four hours as needed for Shortness of Breath. 30 Each 0   • Multiple Vitamins-Minerals (DAILY MULTIVITAMIN PO) Take 1 Tablet by mouth every morning.       No current facility-administered medications on file prior to visit.       Bactrim ds, Pcn [penicillins], and Other environmental      ROS:   Review of Systems   Constitutional: Positive for malaise/fatigue. Negative for chills, diaphoresis, fever and weight loss.   HENT: Negative for congestion, ear discharge, ear pain, hearing loss, nosebleeds, sinus pain, sore throat and tinnitus.    Eyes: Negative for blurred vision, double vision, photophobia, pain, discharge and redness.   Respiratory: Positive for  "shortness of breath. Negative for cough, hemoptysis, sputum production, wheezing and stridor.    Cardiovascular: Negative for chest pain, palpitations, orthopnea, claudication, leg swelling and PND.   Gastrointestinal: Negative for abdominal pain, constipation, diarrhea, heartburn, nausea and vomiting.   Genitourinary: Negative for dysuria and urgency.   Musculoskeletal: Negative for back pain, falls, joint pain, myalgias and neck pain.   Skin: Negative for itching and rash.   Neurological: Positive for weakness. Negative for dizziness, tremors, speech change, focal weakness and headaches.   Endo/Heme/Allergies: Negative for environmental allergies.   Psychiatric/Behavioral: Negative for depression.       /64 (BP Location: Right arm, Patient Position: Sitting, BP Cuff Size: Adult)   Resp 16   Ht 1.676 m (5' 6\")   SpO2 94%   Physical Exam  Vitals reviewed.   Constitutional:       General: She is not in acute distress.     Appearance: Normal appearance. She is well-developed and normal weight.   HENT:      Head: Normocephalic and atraumatic.      Right Ear: External ear normal.      Left Ear: External ear normal.      Nose: Nose normal. No congestion.      Mouth/Throat:      Mouth: Mucous membranes are moist.      Pharynx: Oropharynx is clear. No oropharyngeal exudate.   Eyes:      General: No scleral icterus.     Extraocular Movements: Extraocular movements intact.      Conjunctiva/sclera: Conjunctivae normal.      Pupils: Pupils are equal, round, and reactive to light.   Neck:      Vascular: No JVD.      Trachea: No tracheal deviation.   Cardiovascular:      Rate and Rhythm: Normal rate and regular rhythm.      Heart sounds: Normal heart sounds. No murmur heard.    No friction rub. No gallop.   Pulmonary:      Effort: Pulmonary effort is normal. No accessory muscle usage or respiratory distress.      Breath sounds: No wheezing or rales.      Comments: Decreased bs b/l at lung bases  Abdominal:      General: " There is no distension.      Palpations: Abdomen is soft.      Tenderness: There is no abdominal tenderness.   Musculoskeletal:         General: No tenderness or deformity. Normal range of motion.      Cervical back: Neck supple.      Right lower leg: No edema.      Left lower leg: No edema.   Lymphadenopathy:      Cervical: No cervical adenopathy.   Skin:     General: Skin is warm and dry.      Findings: No rash.      Nails: There is no clubbing.   Neurological:      Mental Status: She is alert and oriented to person, place, and time.      Cranial Nerves: No cranial nerve deficit.      Gait: Gait normal.   Psychiatric:         Mood and Affect: Mood normal.         Behavior: Behavior normal.         PFTs as reviewed by me personally: as per hPI    Imaging as reviewed by me personally:  As per hPI    Assessment:  1. Chronic obstructive pulmonary disease, unspecified COPD type (HCC)  umeclidinium-vilanterol (ANORO ELLIPTA) 62.5-25 MCG/INH AEROSOL POWDER, BREATH ACTIVATED inhaler    umeclidinium-vilanterol (ANORO ELLIPTA) 62.5-25 MCG/INH AEROSOL POWDER, BREATH ACTIVATED inhaler   2. Primary cancer of right upper lobe of lung (HCC)     3. Former smoker     4. Chronic respiratory failure with hypoxia (HCC)         Plan:  1.  Fairly new formal diagnosis and unknown if progressive but symptoms are not currently controlled on only short acting bronchodilators.  No history of exacerbations to warrant inhaled corticosteroids however we will start her on Anoro and continue bronchodilators as needed.  She is tobacco free.  She plans to establish care with providers in [place] upon her arrival there.  2.  Status post radiation therapy with most recent imaging on June 22 showing no evidence of recurrence, radiation changes.  She is followed by radiation oncology locally and will need to establish care once she arrives in [place] for ongoing surveillance imaging.  3.  Tobacco free.  Encouraged ongoing abstinence.  4.  Patient did  have overnight oximetry documenting desaturation without clustering.  She uses supplemental oxygen at 1-1/2 to 2 L at night with sleep and as needed during the day.  She is both compliant with and benefiting from this.  She may benefit from pulmonary rehab once she establishes care in [place].  Return if symptoms worsen or fail to improve.

## 2022-07-06 NOTE — NON-PROVIDER
Dispensed Anoro samples x2.    Lot#: [id number]  Exp: 6/23    Provider: YINA Barksdale MD     Logged distribution of sample on data sheet.     Dispensed by: Pilar Lang, Med Ass't

## 2022-07-25 ENCOUNTER — OFFICE VISIT (OUTPATIENT)
Dept: MEDICAL GROUP | Facility: MEDICAL CENTER | Age: 77
End: 2022-07-25
Payer: MEDICARE

## 2022-07-25 VITALS
OXYGEN SATURATION: 97 % | WEIGHT: 139 LBS | TEMPERATURE: 97.4 F | BODY MASS INDEX: 22.34 KG/M2 | SYSTOLIC BLOOD PRESSURE: 92 MMHG | HEART RATE: 78 BPM | HEIGHT: 66 IN | DIASTOLIC BLOOD PRESSURE: 56 MMHG

## 2022-07-25 DIAGNOSIS — F40.243 ANXIETY WITH FLYING: ICD-10-CM

## 2022-07-25 DIAGNOSIS — C34.11 PRIMARY CANCER OF RIGHT UPPER LOBE OF LUNG (HCC): ICD-10-CM

## 2022-07-25 PROCEDURE — 99213 OFFICE O/P EST LOW 20 MIN: CPT | Performed by: NURSE PRACTITIONER

## 2022-07-25 RX ORDER — ALPRAZOLAM 0.25 MG/1
.25-.5 TABLET ORAL
Qty: 20 TABLET | Refills: 0 | Status: SHIPPED | OUTPATIENT
Start: 2022-07-25 | End: 2022-08-09

## 2022-07-25 ASSESSMENT — PATIENT HEALTH QUESTIONNAIRE - PHQ9: CLINICAL INTERPRETATION OF PHQ2 SCORE: 0

## 2022-07-25 ASSESSMENT — FIBROSIS 4 INDEX: FIB4 SCORE: 7.18

## 2022-07-25 NOTE — PROGRESS NOTES
"Chief Complaint   Patient presents with   • Medication Refill     Xanax for Flying       Subjective:     HPI:     Evie Morillo is a 77 y.o. female   here to discuss the evaluation and management of:     Tells me she is moving to [place] this August 3rd to be closer to her family. Requesting to have Xanax for flying. Has used this in the past.    checked-no hx of medications in the last year. She is very excited about moving back and indulging in some of her favorite seafood dishes.     Primary cancer of right upper lobe of lung(HCC)  S/p radiation therapy with most recent imaging on June 2022 showing no evidence of recurrence, radiation changes.  She is followed by radiation oncology locally and will need to establish care once she arrives in [place] for ongoing surveillance imaging.     \"There is right upper lobe pleural-based abnormal density which is unchanged in size measuring 2.9 x 1.5 cm. This is most consistent with postradiation therapy change and there is no evidence for recurrent tumor\"    Using 2 L of oxygen 24 hours per day.     ROS: : see above      Current Outpatient Medications:   •  ALPRAZolam (XANAX) 0.25 MG Tab, Take 1-2 Tablets by mouth 1 time a day as needed for Anxiety (or prior to flying) for up to 15 days., Disp: 20 Tablet, Rfl: 0  •  umeclidinium-vilanterol (ANORO ELLIPTA) 62.5-25 MCG/INH AEROSOL POWDER, BREATH ACTIVATED inhaler, Inhale 1 Puff every day., Disp: 1 Each, Rfl: 0  •  umeclidinium-vilanterol (ANORO ELLIPTA) 62.5-25 MCG/INH AEROSOL POWDER, BREATH ACTIVATED inhaler, Inhale 1 Puff every day., Disp: 2 Each, Rfl: 0  •  ondansetron (ZOFRAN ODT) 4 MG TABLET DISPERSIBLE, Take 1 Tablet by mouth every 6 hours as needed for Nausea for up to 15 doses., Disp: 15 Tablet, Rfl: 0  •  losartan (COZAAR) 50 MG Tab, Take 1 Tablet by mouth every day., Disp: 90 Tablet, Rfl: 2  •  levothyroxine (SYNTHROID) 88 MCG Tab, TAKE 1 TABLET BY MOUTH EVERY MORNING ON AN EMPTY STOMACH, Disp: 90 Tablet, Rfl: " "1  •  spironolactone (ALDACTONE) 50 MG Tab, TAKE 1 TABLET BY MOUTH DAILY, Disp: 100 Tablet, Rfl: 2  •  Multiple Vitamins-Minerals (ALIVE MULTI-VITAMIN PO), Take  by mouth every 48 hours. FRUIT AND VEGGIES, Disp: , Rfl:   •  albuterol 108 (90 Base) MCG/ACT Aero Soln inhalation aerosol, Inhale 2 Puffs every four hours as needed., Disp: , Rfl:   •  escitalopram (LEXAPRO) 20 MG tablet, Take 1 Tablet by mouth every day., Disp: 30 Tablet, Rfl: 11  •  levalbuterol (XOPENEX HFA) 45 MCG/ACT inhaler, Inhale 1-2 Puffs every four hours as needed for Shortness of Breath., Disp: 15 g, Rfl: 0  •  ipratropium-albuterol (DUONEB) 0.5-2.5 (3) MG/3ML nebulizer solution, Take 3 mL by nebulization every four hours as needed for Shortness of Breath., Disp: 30 Each, Rfl: 0  •  Multiple Vitamins-Minerals (DAILY MULTIVITAMIN PO), Take 1 Tablet by mouth every morning., Disp: , Rfl:     Allergies   Allergen Reactions   • Bactrim Ds Vomiting     Pt states that it made her vomit and pass out    • Pcn [Penicillins] Shortness of Breath, Rash and Swelling      Tolerated ceftriaxone 8/15/21.   • Other Environmental Runny Nose     Dogs, cats       Past Medical History:   Diagnosis Date   • AAA (abdominal aortic aneurysm) ([facility])     had surgery for this.   • Arthritis     fingers   • Bladder infection 10/15/2021    antibiotic course done   • Bowel habit changes     diarrhea   • Bronchitis    • CAD (coronary artery disease) 10/4/2012    2 stents [place] 2009    • Cataract     right eye   • Chronic insomnia 5/23/2016   • COPD (chronic obstructive pulmonary disease) ([facility])     \"I think\"   • Dental disorder     upper denture   • Depression 9/26/2013    not a current issue-not on medications   • Diverticula of colon    • Environmental and seasonal allergies    • High cholesterol    • Hyperlipidemia 10/4/2012   • Hypertension    • Hypothyroid 10/17/2013   • Lung nodule     Right   • Lupus ([facility])    • Nonrheumatic aortic valve insufficiency     Dr. Pulido "   • Oxygen dependent     1 L O2 NC   • Pneumonia    • Postural hypotension 10/7/2014     IMO load March 2020   • Sleep apnea     (+) ADRIANNA , not using CPAP   • TIA (transient ischemic attack) 10/04/2012    2001 (x2) and then one in 2003   • Tobacco abuse 4/20/2016     Past Surgical History:   Procedure Laterality Date   • SC DX BONE MARROW ASPIRATIONS Bilateral 7/14/2021    Procedure: ASPIRATION, BONE MARROW - VEGA;  Surgeon: Jin Conrad M.D.;  Location: ENDOSCOPY [facility];  Service: Orthopedics   • SC DX BONE MARROW BIOPSIES Bilateral 7/14/2021    Procedure: BIOPSY, BONE MARROW, USING NEEDLE OR TROCAR;  Surgeon: Jin Conrad M.D.;  Location: ENDOSCOPY [facility];  Service: Orthopedics   • AAA WITH STENT GRAFT  02/2020   • STENT PLACEMENT  2008    x 2 Dr Can in [place]   • OTHER Left     plantar wart removed on left foot   • OTHER      throat polyps removed (non cancerous) > 20 years ago     Family History   Problem Relation Age of Onset   • Heart Disease Mother    • Heart Attack Mother    • Heart Disease Father    • Heart Attack Father    • Cancer Maternal Grandmother         stomach   • Diabetes Maternal Grandmother    • Cancer Maternal Grandfather         brain   • Stroke Paternal Grandmother    • Cancer Other      Social History     Socioeconomic History   • Marital status:      Spouse name: Not on file   • Number of children: Not on file   • Years of education: Not on file   • Highest education level: Not on file   Occupational History   • Not on file   Tobacco Use   • Smoking status: Former Smoker     Packs/day: 0.50     Years: 40.00     Pack years: 20.00     Types: Cigarettes     Quit date: 2/10/2019     Years since quitting: 3.4   • Smokeless tobacco: Never Used   Vaping Use   • Vaping Use: Never used   Substance and Sexual Activity   • Alcohol use: Not Currently   • Drug use: Not Currently   • Sexual activity: Not Currently     Partners: Male   Other Topics Concern   • Not on file  "  Social History Narrative    On disability      Social Determinants of Health     Financial Resource Strain: Not on file   Food Insecurity: Not on file   Transportation Needs: Not on file   Physical Activity: Not on file   Stress: Not on file   Social Connections: Not on file   Intimate Partner Violence: Not on file   Housing Stability: Not on file       Objective:     Vitals: BP (!) 92/56 (BP Location: Right arm, Patient Position: Sitting, BP Cuff Size: Adult)   Pulse 78   Temp 36.3 °C (97.4 °F) (Temporal)   Ht 1.676 m (5' 6\")   Wt 63 kg (139 lb)   LMP  (LMP Unknown)   SpO2 97%   BMI 22.44 kg/m²    General: Alert, pleasant, NAD. Wearing nasal cannula oxygen.   HEENT: Normocephalic.  Neck supple.   Respiratory: no distress, no audible wheezing, RR -WNL  Skin: Warm, dry, no rashes.  Extremities: No leg edema. No discoloration  Neurological: No tremors  Psych:  Affect/mood is normal, judgement is good, memory is intact, grooming is appropriate.    Assessment/Plan:     Evie was seen today for medication refill.    Diagnoses and all orders for this visit:    Anxiety with flying  Have sent low dose Xanax prior to flying.   checked.  Have discussed with patient's precautions with use and side effects.  -     ALPRAZolam (XANAX) 0.25 MG Tab; Take 1-2 Tablets by mouth 1 time a day as needed for Anxiety (or prior to flying) for up to 15 days.    Primary cancer of right upper lobe of lung (HCC)  S/p radiation.   Followed by radiation therapy  Needs to establish with new provider in [place]. Recommend reaching out to Nurse Navigator as they may have contacts.       Return if symptoms worsen or fail to improve.          Cindy NAIR.  "

## 2022-07-27 ENCOUNTER — PATIENT OUTREACH (OUTPATIENT)
Dept: OTHER | Facility: MEDICAL CENTER | Age: 77
End: 2022-07-27
Payer: MEDICARE

## 2022-07-27 NOTE — PROGRESS NOTES
Submission received 7/26 from [email or web address]:Cancer Nurse Navigator Support Request (see below).  Call placed to patient. Left message    Form Summary   Name Evie Morillo   Phone Number [phone]   Email Address [email or web address]   Diagnoses Lung cancer   Notes to the navigator Will you please contact me

## 2022-07-28 RX ORDER — TRAZODONE HYDROCHLORIDE 100 MG/1
100 TABLET ORAL
Qty: 90 TABLET | Refills: 3 | Status: SHIPPED | OUTPATIENT
Start: 2022-07-28 | End: 2023-06-20

## 2022-07-28 NOTE — PROGRESS NOTES
Pt returned call yesterday and left message.  Spoke with patient this am.  She asked for assistance getting appointment at [facility].  Pt reporting is moving to [place], next Wednesday and needs to do follow up there.  She said that radiation oncologist had said that we can send records if EMR there is not able to show [facility] records.  Discussed would research what particular follow up and options and get back with patient.    Review of chart and notes.  Looks like patient needs 3 month f/u CT scan.  Notes also indicate pt plans to establish with pulmonology there for current breathing limitations and more increased shortness of breath than expected with activity.  Pt in survivorship and should be able to establish with pulmonary for f/u CT scan and they can make any needed/appropriate referrals from there.    Call placed to patient and provided information.  Pt has contact information and reports will call with any additional questions or needs.

## 2022-08-15 ENCOUNTER — TELEPHONE (OUTPATIENT)
Dept: SLEEP MEDICINE | Facility: MEDICAL CENTER | Age: 77
End: 2022-08-15
Payer: MEDICARE

## 2022-08-15 NOTE — TELEPHONE ENCOUNTER
VOICEMAIL  1. Caller Name: patient                       Call Back Number: [phone] (home)       2. Message: patient requesting Pulmonary records to be faxed to [facility] [phone]    3. Patient approves office to leave a detailed voicemail/[employer] message: no and N\A    Last seen 7/6/22 Dr. Barksdale       Sent Form[employer] message to the patient informed I will send last visit notes. She will need to contact HIM and request all pulmonary records to be sent. Pt provided with contact information to HIM       Chart notes from 7/6/22 faxed to [facility] with cover sheet [phone]

## 2022-09-06 DIAGNOSIS — F32.A DEPRESSION, UNSPECIFIED DEPRESSION TYPE: ICD-10-CM

## 2022-09-07 RX ORDER — ESCITALOPRAM OXALATE 20 MG/1
20 TABLET ORAL DAILY
Qty: 30 TABLET | Refills: 11 | Status: SHIPPED | OUTPATIENT
Start: 2022-09-07 | End: 2023-09-27

## 2022-11-07 ENCOUNTER — TELEPHONE (OUTPATIENT)
Dept: HEALTH INFORMATION MANAGEMENT | Facility: OTHER | Age: 77
End: 2022-11-07

## 2022-11-11 ENCOUNTER — PATIENT MESSAGE (OUTPATIENT)
Dept: HEALTH INFORMATION MANAGEMENT | Facility: OTHER | Age: 77
End: 2022-11-11

## 2023-05-09 RX ORDER — SPIRONOLACTONE 50 MG/1
50 TABLET, FILM COATED ORAL DAILY
Qty: 90 TABLET | Refills: 1 | Status: SHIPPED | OUTPATIENT
Start: 2023-05-09

## 2023-06-20 RX ORDER — TRAZODONE HYDROCHLORIDE 100 MG/1
100 TABLET ORAL
Qty: 90 TABLET | Refills: 0 | Status: SHIPPED | OUTPATIENT
Start: 2023-06-20 | End: 2023-09-12

## 2023-11-29 ENCOUNTER — PATIENT MESSAGE (OUTPATIENT)
Dept: HEALTH INFORMATION MANAGEMENT | Facility: OTHER | Age: 78
End: 2023-11-29

## 2024-03-22 DIAGNOSIS — I25.10 CORONARY ARTERY DISEASE INVOLVING NATIVE CORONARY ARTERY OF NATIVE HEART WITHOUT ANGINA PECTORIS: ICD-10-CM

## 2024-04-04 NOTE — TELEPHONE ENCOUNTER
Has upcoming appt w/ PCP. Will send 3 months of fills to pharmacy.  
Was the patient seen in the last year in this department? Yes    Does patient have an active prescription for medications requested? No     Received Request Via: Pharmacy      Pt met protocol?: Yes    OV 10/19     
show

## 2024-05-18 NOTE — TELEPHONE ENCOUNTER
1. Caller Name: Evie                        Call Back Number: [phone] (home)         How would the patient prefer to be contacted with a response: Phone call OK to leave a detailed message    Patient called stating that she is experiencing sneezing, bloody noses and itchy eyes. She explained that over the counter allergy medicine is not helping and would like to know what you recommend.    Please advise.      Face, neck, chest and upper extremities.

## (undated) DEVICE — SOD. CHL 10CC SYRINGE PREFILL - W/10 CC (30/BX)

## (undated) DEVICE — SET LEADWIRE 5 LEAD BEDSIDE DISPOSABLE ECG (1SET OF 5/EA)

## (undated) DEVICE — ELECTRODE 850 FOAM ADHESIVE - HYDROGEL RADIOTRNSPRNT (50/PK)

## (undated) DEVICE — CANNULA O2 COMFORT SOFT EAR ADULT 7 FT TUBING (50/CA)

## (undated) DEVICE — SYRINGE 3 CC 22 GA X 1-1/2 - NDL SAFETY (50/BX 8BX/CA)

## (undated) DEVICE — SYRINGE 6 CC 20 GA X 1 1/2 - NDL SAFETY  (50/BX)

## (undated) DEVICE — TUBING CLEARLINK DUO-VENT - C-FLO (48EA/CA)

## (undated) DEVICE — TUBING O2 7FT TIP SMTH BORE - (50/CA)

## (undated) DEVICE — BANDAID SHEER STRIP 3/4 IN (100EA/BX 12BX/CA)